# Patient Record
Sex: MALE | Race: WHITE | Employment: OTHER | RURAL
[De-identification: names, ages, dates, MRNs, and addresses within clinical notes are randomized per-mention and may not be internally consistent; named-entity substitution may affect disease eponyms.]

---

## 2017-07-14 ENCOUNTER — OFFICE VISIT (OUTPATIENT)
Dept: CARDIOLOGY CLINIC | Age: 82
End: 2017-07-14

## 2017-07-14 VITALS
OXYGEN SATURATION: 94 % | RESPIRATION RATE: 12 BRPM | DIASTOLIC BLOOD PRESSURE: 78 MMHG | SYSTOLIC BLOOD PRESSURE: 110 MMHG | HEART RATE: 92 BPM | HEIGHT: 71 IN

## 2017-07-14 DIAGNOSIS — I10 ESSENTIAL HYPERTENSION: ICD-10-CM

## 2017-07-14 DIAGNOSIS — E78.5 DYSLIPIDEMIA: ICD-10-CM

## 2017-07-14 DIAGNOSIS — E11.9 CONTROLLED TYPE 2 DIABETES MELLITUS WITHOUT COMPLICATION, WITHOUT LONG-TERM CURRENT USE OF INSULIN (HCC): ICD-10-CM

## 2017-07-14 DIAGNOSIS — M16.12 PRIMARY OSTEOARTHRITIS OF LEFT HIP: ICD-10-CM

## 2017-07-14 DIAGNOSIS — Z01.818 PREOPERATIVE CLEARANCE: Primary | ICD-10-CM

## 2017-07-14 DIAGNOSIS — E11.42 DIABETIC POLYNEUROPATHY ASSOCIATED WITH TYPE 2 DIABETES MELLITUS (HCC): ICD-10-CM

## 2017-07-14 DIAGNOSIS — I71.40 ABDOMINAL AORTIC ANEURYSM (AAA) WITHOUT RUPTURE: ICD-10-CM

## 2017-07-14 DIAGNOSIS — M79.605 PAIN OF LEFT LOWER EXTREMITY: ICD-10-CM

## 2017-07-14 DIAGNOSIS — I25.10 CORONARY ARTERY DISEASE INVOLVING NATIVE CORONARY ARTERY OF NATIVE HEART WITHOUT ANGINA PECTORIS: ICD-10-CM

## 2017-07-14 PROBLEM — M79.606 LEG PAIN: Status: ACTIVE | Noted: 2017-07-14

## 2017-07-14 PROBLEM — M16.9 DEGENERATIVE JOINT DISEASE (DJD) OF HIP: Status: ACTIVE | Noted: 2017-07-14

## 2017-07-14 RX ORDER — GABAPENTIN 600 MG/1
2400 TABLET ORAL 2 TIMES DAILY
Status: ON HOLD | COMMUNITY
End: 2017-07-22

## 2017-07-14 RX ORDER — DICLOFENAC SODIUM 50 MG/1
TABLET, DELAYED RELEASE ORAL 2 TIMES DAILY
COMMUNITY
End: 2017-08-30

## 2017-07-14 RX ORDER — FLUTICASONE FUROATE AND VILANTEROL 100; 25 UG/1; UG/1
1 POWDER RESPIRATORY (INHALATION) DAILY
COMMUNITY
End: 2017-08-30

## 2017-07-14 RX ORDER — ASPIRIN 81 MG/1
TABLET ORAL DAILY
COMMUNITY

## 2017-07-14 RX ORDER — OXYCODONE AND ACETAMINOPHEN 5; 325 MG/1; MG/1
2 TABLET ORAL
COMMUNITY
End: 2017-07-16

## 2017-07-14 RX ORDER — MONTELUKAST SODIUM 10 MG/1
10 TABLET ORAL DAILY
COMMUNITY

## 2017-07-14 RX ORDER — DULOXETIN HYDROCHLORIDE 30 MG/1
30 CAPSULE, DELAYED RELEASE ORAL
Status: ON HOLD | COMMUNITY
End: 2017-07-22

## 2017-07-14 RX ORDER — TAMSULOSIN HYDROCHLORIDE 0.4 MG/1
0.8 CAPSULE ORAL DAILY
COMMUNITY

## 2017-07-14 RX ORDER — FUROSEMIDE 40 MG/1
TABLET ORAL DAILY
COMMUNITY
End: 2017-07-22

## 2017-07-14 RX ORDER — CYCLOBENZAPRINE HCL 10 MG
TABLET ORAL
COMMUNITY
End: 2017-07-22

## 2017-07-14 RX ORDER — CARVEDILOL 3.12 MG/1
TABLET ORAL 2 TIMES DAILY WITH MEALS
COMMUNITY
End: 2017-08-30

## 2017-07-14 RX ORDER — DICLOFENAC SODIUM 10 MG/G
GEL TOPICAL 4 TIMES DAILY
COMMUNITY
End: 2017-07-22

## 2017-07-14 RX ORDER — GLIMEPIRIDE 1 MG/1
TABLET ORAL 2 TIMES DAILY
Status: ON HOLD | COMMUNITY
End: 2017-07-22

## 2017-07-14 RX ORDER — METFORMIN HYDROCHLORIDE 500 MG/1
TABLET ORAL 2 TIMES DAILY WITH MEALS
COMMUNITY
End: 2017-08-30

## 2017-07-14 RX ORDER — LEVOTHYROXINE SODIUM 50 UG/1
TABLET ORAL
COMMUNITY

## 2017-07-14 NOTE — PROGRESS NOTES
Bebeto Azevedo is a 80 y.o. male is here for cardiac evaluation, pre-op clearance for possible L THR. 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 DJD, prostate ca followed locally by Dr. Km Purcell; previously by Cardiology elsewhere--last seen 2015--lexiscan MPI with chronic inferolateral wall infarct/ischemia, LVEF 45%. Occasional asthma sx. No exertional CP or CV sx. DM followed by PCP, recent HbA1c 7.0. Severe back/L hip, L leg pain--s/p w/u--Xrays, MRI, dopplers, etc.--?hip etiology and considering L THR. .  The patient denies chest pain/ shortness of breath, orthopnea, PND, LE edema, palpitations, syncope, presyncope or fatigue. Patient Active Problem List    Diagnosis Date Noted    Coronary artery disease involving native coronary artery of native heart without angina pectoris 07/14/2017    Essential hypertension 07/14/2017    Dyslipidemia 07/14/2017    Controlled type 2 diabetes mellitus without complication, without long-term current use of insulin ([address] 07/14/2017    Abdominal aortic aneurysm (AAA) without rupture ([address] 07/14/2017    Diabetic polyneuropathy associated with type 2 diabetes mellitus ([address] 07/14/2017    Degenerative joint disease (DJD) of hip 07/14/2017      Fanta Barger MD  Past Medical History:   Diagnosis Date    AAA (abdominal aortic aneurysm) ([address]     CAD (coronary artery disease)     Mitzy MPI 7/14 LVEF 45, chronic inf-lat ischemia; Echo 7/14 LVEF 45, mod LVH, mild LAE, mild AI/MR    Diabetic polyneuropathy ([address]     DJD (degenerative joint disease)     DM II (diabetes mellitus, type II), controlled ([address]     Dyslipidemia     GERD (gastroesophageal reflux disease)     History of left bundle branch block (LBBB)     HTN (hypertension)     Prostate CA ([address]       History reviewed.  No pertinent surgical history. Allergies   Allergen Reactions    Contrast Agent [Iodine] Hives    Sulfa (Sulfonamide Antibiotics) Unknown (comments)      History reviewed. No pertinent family history. Social History     Social History    Marital status: UNKNOWN     Spouse name: N/A    Number of children: N/A    Years of education: N/A     Occupational History    Not on file. Social History Main Topics    Smoking status: Former Smoker     Types: Cigarettes     Quit date: 7/14/1997    Smokeless tobacco: Not on file    Alcohol use Not on file    Drug use: Not on file    Sexual activity: Not on file     Other Topics Concern    Not on file     Social History Narrative    No narrative on file      Current Outpatient Prescriptions   Medication Sig    tamsulosin (FLOMAX) 0.4 mg capsule Take 0.8 mg by mouth daily.  levothyroxine (SYNTHROID) 50 mcg tablet Take  by mouth Daily (before breakfast).  metFORMIN (GLUCOPHAGE) 500 mg tablet Take  by mouth two (2) times daily (with meals).  fluticasone-vilanterol (BREO ELLIPTA) 100-25 mcg/dose inhaler Take 1 Puff by inhalation daily.  carvedilol (COREG) 3.125 mg tablet Take  by mouth two (2) times daily (with meals).  glimepiride (AMARYL) 1 mg tablet Take  by mouth two (2) times a day.  montelukast (SINGULAIR) 10 mg tablet Take 10 mg by mouth daily.  aspirin delayed-release 81 mg tablet Take  by mouth daily.  diclofenac (VOLTAREN) 1 % gel Apply  to affected area four (4) times daily.  furosemide (LASIX) 40 mg tablet Take  by mouth daily.  diclofenac EC (VOLTAREN) 50 mg EC tablet Take  by mouth two (2) times a day.  oxyCODONE-acetaminophen (PERCOCET) 5-325 mg per tablet Take 2 Tabs by mouth every six (6) hours as needed for Pain.  cyclobenzaprine (FLEXERIL) 10 mg tablet Take  by mouth three (3) times daily as needed for Muscle Spasm(s).  DULoxetine (CYMBALTA) 30 mg capsule Take 30 mg by mouth three (3) times daily as needed.     gabapentin (NEURONTIN) 600 mg tablet Take 2,400 mg by mouth two (2) times a day. No current facility-administered medications for this visit. Review of Symptoms:    CONST  No weight change. No fever, chills, sweats    ENT No visual changes, URI sx, sore throat    CV  See HPI   RESP  No cough, or sputum, wheezing. Also see HPI   GI  No abdominal pain or change in bowel habits. No heartburn or dysphagia. No melena or rectal bleeding.   No dysuria, urgency, frequency, hematuria   MSKEL  No joint pain, swelling. No muscle pain. SKIN  No rash or lesions. NEURO  No headache, syncope, or seizure. No weakness, loss of sensation, or paresthesias. PSYCH  No low mood or depression  No anxiety. HE/LYMPH  No easy bruising, abnormal bleeding, or enlarged glands.         Physical ExamPhysical Exam:    Visit Vitals    /78 (BP 1 Location: Left arm, BP Patient Position: Sitting)    Pulse 92    Resp 12    Ht 5' 11\" (1.803 m)    SpO2 94%     Gen: NAD  HEENT:  PERRL, throat clear  Neck: no adenopathy, no thyromegaly, no JVD   Heart:  Regular,Nl S1S2,  no murmur, gallop or rub.   Lungs:  clear  Abdomen:   Soft, non-tender, bowel sounds are active.   Extremities:  No edema  Pulse: symmetric  Neuro: A&O times 3, No focal neuro deficits    Cardiographics    ECG: NSR, old inferolat MI, NST      Assessment:         Patient Active Problem List    Diagnosis Date Noted    Coronary artery disease involving native coronary artery of native heart without angina pectoris 07/14/2017    Essential hypertension 07/14/2017    Dyslipidemia 07/14/2017    Controlled type 2 diabetes mellitus without complication, without long-term current use of insulin ([address] 07/14/2017    Abdominal aortic aneurysm (AAA) without rupture ([address] 07/14/2017    Diabetic polyneuropathy associated with type 2 diabetes mellitus ([address] 07/14/2017    Degenerative joint disease (DJD) of hip 07/14/2017     Here for cardiac evaluation, pre-op clearance for possible L THR. 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 DJD, prostate ca followed locally by Dr. Antolin La; previously by Cardiology elsewhere--last seen 2015--lexiscan MPI with chronic inferolateral wall infarct/ischemia, LVEF 45%. Occasional asthma sx. No exertional CP or CV sx. DM followed by PCP, recent HbA1c 7.0. Severe back/L hip, L leg pain--s/p w/u--Xrays, MRI, dopplers, etc.--?hip etiology and considering L THR. Plan:     Allison Perez MPI--if unchanged (chronic inferolateral infarct/ischemia)--ok for planned sgy  Echo/doppler  ?statin  ? ACEI vs ARB  Continue current meds--DM and Coreg, asthma meds  Lipids, Lab, DM per PCP  F/u after tests      Pablo Anaya MD

## 2017-07-14 NOTE — MR AVS SNAPSHOT
Visit Information Date & Time Provider Department Dept. Phone Encounter #  
 7/14/2017  3:20 PM Rogerio Almaguer, [address] [facility] [place] [phone] [id number] Upcoming Health Maintenance Date Due DTaP/Tdap/Td series (1 - Tdap) 9/16/1953 ZOSTER VACCINE AGE 60> 9/16/1992 GLAUCOMA SCREENING Q2Y 9/16/1997 Pneumococcal 65+ Low/Medium Risk (1 of 2 - PCV13) 9/16/1997 MEDICARE YEARLY EXAM 9/16/1997 INFLUENZA AGE 9 TO ADULT 8/1/2017 Allergies as of 7/14/2017  Review Complete On: 7/14/2017 By: Rogerio Almaguer MD  
  
 Severity Noted Reaction Type Reactions Contrast Agent [Iodine]  07/14/2017    Hives Sulfa (Sulfonamide Antibiotics)  07/14/2017    Unknown (comments) Current Immunizations  Never Reviewed No immunizations on file. Not reviewed this visit You Were Diagnosed With   
  
 Codes Comments Preoperative clearance    -  Primary ICD-10-CM: U61.666 ICD-9-CM: V72.84 Coronary artery disease involving native coronary artery of native heart without angina pectoris     ICD-10-CM: I25.10 ICD-9-CM: 414.01 Vitals BP Pulse Resp Height(growth percentile) SpO2 Smoking Status 110/78 (BP 1 Location: Left arm, BP Patient Position: Sitting) 92 12 5' 11\" (1.803 m) 94% Former Smoker Vitals History Preferred Pharmacy Pharmacy Name Phone [id number], [address] [phone] Your Updated Medication List  
  
   
This list is accurate as of: 7/14/17  3:38 PM.  Always use your most recent med list.  
  
  
  
  
 aspirin delayed-release 81 mg tablet Take  by mouth daily. BREO ELLIPTA 100-25 mcg/dose inhaler Generic drug:  fluticasone-vilanterol Take 1 Puff by inhalation daily. COREG 3.125 mg tablet Generic drug:  carvedilol Take  by mouth two (2) times daily (with meals). cyclobenzaprine 10 mg tablet Commonly known as:  FLEXERIL  
 Take  by mouth three (3) times daily as needed for Muscle Spasm(s). CYMBALTA 30 mg capsule Generic drug:  DULoxetine Take 30 mg by mouth three (3) times daily as needed. FLOMAX 0.4 mg capsule Generic drug:  tamsulosin Take 0.8 mg by mouth daily. furosemide 40 mg tablet Commonly known as:  LASIX Take  by mouth daily. gabapentin 600 mg tablet Commonly known as:  NEURONTIN Take 2,400 mg by mouth two (2) times a day. glimepiride 1 mg tablet Commonly known as:  AMARYL Take  by mouth two (2) times a day. levothyroxine 50 mcg tablet Commonly known as:  SYNTHROID Take  by mouth Daily (before breakfast). metFORMIN 500 mg tablet Commonly known as:  GLUCOPHAGE Take  by mouth two (2) times daily (with meals). oxyCODONE-acetaminophen 5-325 mg per tablet Commonly known as:  PERCOCET Take 2 Tabs by mouth every six (6) hours as needed for Pain. SINGULAIR 10 mg tablet Generic drug:  montelukast  
Take 10 mg by mouth daily. * VOLTAREN 1 % Gel Generic drug:  diclofenac Apply  to affected area four (4) times daily. * diclofenac EC 50 mg EC tablet Commonly known as:  VOLTAREN Take  by mouth two (2) times a day. * Notice: This list has 2 medication(s) that are the same as other medications prescribed for you. Read the directions carefully, and ask your doctor or other care provider to review them with you. We Performed the Following AMB POC EKG ROUTINE W/ 12 LEADS, INTER & REP [81601 CPT(R)] Introducing [facility]! Alyssa Gil introduces [employer] patient portal. Now you can access parts of your medical record, email your doctor's office, and request medication refills online. 1. In your internet browser, go to [email or web address] 2. Click on the First Time User? Click Here link in the Sign In box. You will see the New Member Sign Up page. 3. Enter your [employer] Access Code exactly as it appears below. You will not need to use this code after youve completed the sign-up process. If you do not sign up before the expiration date, you must request a new code. · [employer] Access Code: [email or web address] Expires: 10/3/2017  3:20 PM 
 
4. Enter the last four digits of your Social Security Number (xxxx) and Date of Birth (mm/dd/yyyy) as indicated and click Submit. You will be taken to the next sign-up page. 5. Create a [employer] ID. This will be your [employer] login ID and cannot be changed, so think of one that is secure and easy to remember. 6. Create a [employer] password. You can change your password at any time. 7. Enter your Password Reset Question and Answer. This can be used at a later time if you forget your password. 8. Enter your e-mail address. You will receive e-mail notification when new information is available in [address] 9. Click Sign Up. You can now view and download portions of your medical record. 10. Click the Download Summary menu link to download a portable copy of your medical information. If you have questions, please visit the Frequently Asked Questions section of the [employer] website. Remember, [employer] is NOT to be used for urgent needs. For medical emergencies, dial 911. Now available from your iPhone and Android! Please provide this summary of care documentation to your next provider. Your primary care clinician is listed as Griselda Lime. If you have any questions after today's visit, please call [phone].

## 2017-07-18 ENCOUNTER — TELEPHONE (OUTPATIENT)
Dept: CARDIOLOGY CLINIC | Age: 82
End: 2017-07-18

## 2017-07-18 NOTE — TELEPHONE ENCOUNTER
Daughter calling  - pt scheduled for stress test tomorrow at 5 - 8:30 arrival.    Inquiring about pain medication and diabetic medications - if okay to take or not, need to hold, etc.

## 2017-07-19 ENCOUNTER — TELEPHONE (OUTPATIENT)
Dept: CARDIOLOGY CLINIC | Age: 82
End: 2017-07-19

## 2017-07-19 PROBLEM — R07.9 CHEST PAIN: Status: ACTIVE | Noted: 2017-07-19

## 2017-07-19 PROBLEM — R77.8 ELEVATED TROPONIN I LEVEL: Status: ACTIVE | Noted: 2017-07-19

## 2017-07-19 NOTE — TELEPHONE ENCOUNTER
----- Message from Pao June MD sent at 7/19/2017  3:03 PM EDT -----  Regarding: Krystal Marsh MPI  Advise no obvious blockages or ischemia seen, LVEF normal.  Thanks [facility]

## 2017-07-20 NOTE — TELEPHONE ENCOUNTER
Pt is currently admitted at [facility]. Dr. Jodie Mckenzie saw the pt this morning and informed him of the nm stress test results.

## 2017-07-22 PROBLEM — I21.4 NSTEMI, INITIAL EPISODE OF CARE (HCC): Status: ACTIVE | Noted: 2017-07-22

## 2017-07-22 PROBLEM — R77.8 ELEVATED TROPONIN I LEVEL: Status: RESOLVED | Noted: 2017-07-19 | Resolved: 2017-07-22

## 2017-07-22 PROBLEM — R07.9 CHEST PAIN: Status: RESOLVED | Noted: 2017-07-19 | Resolved: 2017-07-22

## 2017-08-17 ENCOUNTER — APPOINTMENT (OUTPATIENT)
Dept: ULTRASOUND IMAGING | Age: 82
DRG: 177 | End: 2017-08-17
Attending: EMERGENCY MEDICINE
Payer: MEDICARE

## 2017-08-17 ENCOUNTER — APPOINTMENT (OUTPATIENT)
Dept: GENERAL RADIOLOGY | Age: 82
DRG: 177 | End: 2017-08-17
Attending: EMERGENCY MEDICINE
Payer: MEDICARE

## 2017-08-17 ENCOUNTER — HOSPITAL ENCOUNTER (INPATIENT)
Age: 82
LOS: 13 days | Discharge: NURSING, REHAB OR LONG-TERM CARE | DRG: 177 | End: 2017-08-30
Attending: EMERGENCY MEDICINE | Admitting: INTERNAL MEDICINE
Payer: MEDICARE

## 2017-08-17 ENCOUNTER — APPOINTMENT (OUTPATIENT)
Dept: CT IMAGING | Age: 82
DRG: 177 | End: 2017-08-17
Attending: INTERNAL MEDICINE
Payer: MEDICARE

## 2017-08-17 DIAGNOSIS — R26.89 ABNORMALITY OF GAIT DUE TO IMPAIRMENT OF BALANCE: ICD-10-CM

## 2017-08-17 DIAGNOSIS — M16.12 PRIMARY OSTEOARTHRITIS OF LEFT HIP: ICD-10-CM

## 2017-08-17 DIAGNOSIS — R10.9 ABDOMINAL PAIN, UNSPECIFIED LOCATION: ICD-10-CM

## 2017-08-17 DIAGNOSIS — T40.2X5A THERAPEUTIC OPIOID-INDUCED CONSTIPATION (OIC): ICD-10-CM

## 2017-08-17 DIAGNOSIS — R63.0 ANOREXIA: ICD-10-CM

## 2017-08-17 DIAGNOSIS — I21.4 NSTEMI (NON-ST ELEVATED MYOCARDIAL INFARCTION) (HCC): Primary | ICD-10-CM

## 2017-08-17 DIAGNOSIS — I95.0 IDIOPATHIC HYPOTENSION: ICD-10-CM

## 2017-08-17 DIAGNOSIS — K59.03 THERAPEUTIC OPIOID-INDUCED CONSTIPATION (OIC): ICD-10-CM

## 2017-08-17 DIAGNOSIS — M25.552 CHRONIC LEFT HIP PAIN: ICD-10-CM

## 2017-08-17 DIAGNOSIS — R63.4 WEIGHT LOSS: ICD-10-CM

## 2017-08-17 DIAGNOSIS — G89.29 CHRONIC LEFT HIP PAIN: ICD-10-CM

## 2017-08-17 PROBLEM — R77.8 ELEVATED TROPONIN: Status: ACTIVE | Noted: 2017-08-17

## 2017-08-17 LAB
CK MB CFR SERPL CALC: 7.8 % (ref 0–2.5)
CK MB SERPL-MCNC: 1.8 NG/ML (ref 5–25)
CK SERPL-CCNC: 23 U/L (ref 39–308)
TROPONIN I SERPL-MCNC: 0.54 NG/ML

## 2017-08-17 PROCEDURE — 96372 THER/PROPH/DIAG INJ SC/IM: CPT

## 2017-08-17 PROCEDURE — 74011250637 HC RX REV CODE- 250/637: Performed by: INTERNAL MEDICINE

## 2017-08-17 PROCEDURE — 99285 EMERGENCY DEPT VISIT HI MDM: CPT

## 2017-08-17 PROCEDURE — 84484 ASSAY OF TROPONIN QUANT: CPT | Performed by: EMERGENCY MEDICINE

## 2017-08-17 PROCEDURE — 93005 ELECTROCARDIOGRAM TRACING: CPT

## 2017-08-17 PROCEDURE — 71010 XR CHEST PORT: CPT

## 2017-08-17 PROCEDURE — 94762 N-INVAS EAR/PLS OXIMTRY CONT: CPT

## 2017-08-17 PROCEDURE — 74011250636 HC RX REV CODE- 250/636: Performed by: EMERGENCY MEDICINE

## 2017-08-17 PROCEDURE — 36415 COLL VENOUS BLD VENIPUNCTURE: CPT | Performed by: EMERGENCY MEDICINE

## 2017-08-17 PROCEDURE — 96375 TX/PRO/DX INJ NEW DRUG ADDON: CPT

## 2017-08-17 PROCEDURE — 76700 US EXAM ABDOM COMPLETE: CPT

## 2017-08-17 PROCEDURE — 96374 THER/PROPH/DIAG INJ IV PUSH: CPT

## 2017-08-17 PROCEDURE — 82550 ASSAY OF CK (CPK): CPT | Performed by: EMERGENCY MEDICINE

## 2017-08-17 PROCEDURE — 65660000000 HC RM CCU STEPDOWN

## 2017-08-17 RX ORDER — MONTELUKAST SODIUM 10 MG/1
10 TABLET ORAL DAILY
Status: DISCONTINUED | OUTPATIENT
Start: 2017-08-18 | End: 2017-08-30 | Stop reason: HOSPADM

## 2017-08-17 RX ORDER — ACETAMINOPHEN 500 MG
1000 TABLET ORAL 3 TIMES DAILY
Status: DISCONTINUED | OUTPATIENT
Start: 2017-08-18 | End: 2017-08-23

## 2017-08-17 RX ORDER — DULOXETIN HYDROCHLORIDE 30 MG/1
30 CAPSULE, DELAYED RELEASE ORAL DAILY
Status: DISCONTINUED | OUTPATIENT
Start: 2017-08-18 | End: 2017-08-30 | Stop reason: HOSPADM

## 2017-08-17 RX ORDER — INSULIN LISPRO 100 [IU]/ML
INJECTION, SOLUTION INTRAVENOUS; SUBCUTANEOUS
Status: DISCONTINUED | OUTPATIENT
Start: 2017-08-18 | End: 2017-08-30 | Stop reason: HOSPADM

## 2017-08-17 RX ORDER — MORPHINE SULFATE 10 MG/ML
2 INJECTION, SOLUTION INTRAMUSCULAR; INTRAVENOUS
Status: COMPLETED | OUTPATIENT
Start: 2017-08-17 | End: 2017-08-17

## 2017-08-17 RX ORDER — ONDANSETRON 2 MG/ML
4 INJECTION INTRAMUSCULAR; INTRAVENOUS
Status: DISCONTINUED | OUTPATIENT
Start: 2017-08-17 | End: 2017-08-30 | Stop reason: HOSPADM

## 2017-08-17 RX ORDER — TAMSULOSIN HYDROCHLORIDE 0.4 MG/1
0.8 CAPSULE ORAL DAILY
Status: DISCONTINUED | OUTPATIENT
Start: 2017-08-18 | End: 2017-08-30 | Stop reason: HOSPADM

## 2017-08-17 RX ORDER — SODIUM CHLORIDE 0.9 % (FLUSH) 0.9 %
5-10 SYRINGE (ML) INJECTION EVERY 8 HOURS
Status: DISCONTINUED | OUTPATIENT
Start: 2017-08-17 | End: 2017-08-30 | Stop reason: HOSPADM

## 2017-08-17 RX ORDER — DIAZEPAM 5 MG/1
5 TABLET ORAL
Status: DISCONTINUED | OUTPATIENT
Start: 2017-08-17 | End: 2017-08-19

## 2017-08-17 RX ORDER — POLYETHYLENE GLYCOL 3350 17 G/17G
17 POWDER, FOR SOLUTION ORAL DAILY
Status: DISCONTINUED | OUTPATIENT
Start: 2017-08-18 | End: 2017-08-30 | Stop reason: HOSPADM

## 2017-08-17 RX ORDER — ENOXAPARIN SODIUM 100 MG/ML
1 INJECTION SUBCUTANEOUS
Status: COMPLETED | OUTPATIENT
Start: 2017-08-17 | End: 2017-08-17

## 2017-08-17 RX ORDER — LEVOTHYROXINE SODIUM 50 UG/1
50 TABLET ORAL
Status: DISCONTINUED | OUTPATIENT
Start: 2017-08-18 | End: 2017-08-30 | Stop reason: HOSPADM

## 2017-08-17 RX ORDER — ASPIRIN 81 MG/1
81 TABLET ORAL DAILY
Status: DISCONTINUED | OUTPATIENT
Start: 2017-08-18 | End: 2017-08-30 | Stop reason: HOSPADM

## 2017-08-17 RX ORDER — MAGNESIUM SULFATE 100 %
4 CRYSTALS MISCELLANEOUS AS NEEDED
Status: DISCONTINUED | OUTPATIENT
Start: 2017-08-17 | End: 2017-08-30 | Stop reason: HOSPADM

## 2017-08-17 RX ORDER — FENTANYL 12.5 UG/1
1 PATCH TRANSDERMAL
Status: DISCONTINUED | OUTPATIENT
Start: 2017-08-17 | End: 2017-08-19

## 2017-08-17 RX ORDER — FLUTICASONE FUROATE AND VILANTEROL 100; 25 UG/1; UG/1
1 POWDER RESPIRATORY (INHALATION) DAILY
Status: DISCONTINUED | OUTPATIENT
Start: 2017-08-18 | End: 2017-08-24

## 2017-08-17 RX ORDER — ENOXAPARIN SODIUM 100 MG/ML
40 INJECTION SUBCUTANEOUS DAILY
Status: DISCONTINUED | OUTPATIENT
Start: 2017-08-18 | End: 2017-08-30 | Stop reason: HOSPADM

## 2017-08-17 RX ORDER — AMOXICILLIN 250 MG
1 CAPSULE ORAL 2 TIMES DAILY
Status: DISCONTINUED | OUTPATIENT
Start: 2017-08-18 | End: 2017-08-30 | Stop reason: HOSPADM

## 2017-08-17 RX ORDER — GUAIFENESIN 100 MG/5ML
162 LIQUID (ML) ORAL
Status: DISCONTINUED | OUTPATIENT
Start: 2017-08-17 | End: 2017-08-17

## 2017-08-17 RX ORDER — DEXTROSE MONOHYDRATE 100 MG/ML
125-250 INJECTION, SOLUTION INTRAVENOUS AS NEEDED
Status: DISCONTINUED | OUTPATIENT
Start: 2017-08-17 | End: 2017-08-30 | Stop reason: HOSPADM

## 2017-08-17 RX ORDER — ISOSORBIDE MONONITRATE 30 MG/1
30 TABLET, EXTENDED RELEASE ORAL DAILY
Status: DISCONTINUED | OUTPATIENT
Start: 2017-08-18 | End: 2017-08-30 | Stop reason: HOSPADM

## 2017-08-17 RX ORDER — OXYCODONE HYDROCHLORIDE 5 MG/1
10 TABLET ORAL
Status: DISCONTINUED | OUTPATIENT
Start: 2017-08-17 | End: 2017-08-23

## 2017-08-17 RX ORDER — NITROGLYCERIN 0.4 MG/1
0.4 TABLET SUBLINGUAL AS NEEDED
Status: DISCONTINUED | OUTPATIENT
Start: 2017-08-17 | End: 2017-08-30 | Stop reason: HOSPADM

## 2017-08-17 RX ORDER — SODIUM CHLORIDE 0.9 % (FLUSH) 0.9 %
5-10 SYRINGE (ML) INJECTION AS NEEDED
Status: DISCONTINUED | OUTPATIENT
Start: 2017-08-17 | End: 2017-08-30 | Stop reason: HOSPADM

## 2017-08-17 RX ORDER — GABAPENTIN 300 MG/1
600 CAPSULE ORAL 4 TIMES DAILY
Status: DISCONTINUED | OUTPATIENT
Start: 2017-08-18 | End: 2017-08-27

## 2017-08-17 RX ORDER — NALOXONE HYDROCHLORIDE 0.4 MG/ML
0.4 INJECTION, SOLUTION INTRAMUSCULAR; INTRAVENOUS; SUBCUTANEOUS AS NEEDED
Status: DISCONTINUED | OUTPATIENT
Start: 2017-08-17 | End: 2017-08-30 | Stop reason: HOSPADM

## 2017-08-17 RX ORDER — DEXTROSE 50 % IN WATER (D50W) INTRAVENOUS SYRINGE
12.5-25 AS NEEDED
Status: DISCONTINUED | OUTPATIENT
Start: 2017-08-17 | End: 2017-08-17

## 2017-08-17 RX ORDER — CARVEDILOL 3.12 MG/1
3.12 TABLET ORAL 2 TIMES DAILY WITH MEALS
Status: DISCONTINUED | OUTPATIENT
Start: 2017-08-18 | End: 2017-08-22

## 2017-08-17 RX ORDER — ONDANSETRON 2 MG/ML
4 INJECTION INTRAMUSCULAR; INTRAVENOUS
Status: COMPLETED | OUTPATIENT
Start: 2017-08-17 | End: 2017-08-17

## 2017-08-17 RX ORDER — ATORVASTATIN CALCIUM 10 MG/1
10 TABLET, FILM COATED ORAL DAILY
Status: DISCONTINUED | OUTPATIENT
Start: 2017-08-18 | End: 2017-08-29

## 2017-08-17 RX ADMIN — ONDANSETRON 4 MG: 2 INJECTION INTRAMUSCULAR; INTRAVENOUS at 17:08

## 2017-08-17 RX ADMIN — MORPHINE SULFATE 2 MG: 10 INJECTION INTRAMUSCULAR; INTRAVENOUS; SUBCUTANEOUS at 19:09

## 2017-08-17 RX ADMIN — ENOXAPARIN SODIUM 100 MG: 100 INJECTION SUBCUTANEOUS at 19:09

## 2017-08-17 RX ADMIN — DIAZEPAM 5 MG: 5 TABLET ORAL at 23:24

## 2017-08-17 NOTE — ED NOTES
Pt continues to moan and sob, he is restless and moving all about the bed, pt has been repositioned multiple times, when asked if he's in pain he states \"i just don't know, I'm miserable\", dr Rodolfo cMrae notified

## 2017-08-17 NOTE — ED NOTES
Pt yelling out, opened door and found pt sitting at foot of bed, pt assisted back in bed on right side lying with pillows behind back, pt is moaning but denies pain presently, ice chips given, call bell placed in patient hand and patient reminded to not get out of bed alone, pt verbalizes understanding

## 2017-08-17 NOTE — IP AVS SNAPSHOT
[address] [facility] 
[phone] Patient: Fernando Zelaya MRN: [id number] You are allergic to the following Allergen Reactions Contrast Agent (Iodine) Hives Levaquin (Levofloxacin) Unknown (comments) Sulfa (Sulfonamide Antibiotics) Unknown (comments) Recent Documentation Height Weight BMI Smoking Status 1.778 m 96.5 kg 30.53 kg/m2 Former Smoker Unresulted Labs Order Current Status CULTURE, WOUND W GRAM STAIN Preliminary result Emergency Contacts Name Discharge Info Relation Home Work Mobile Maria Elena Cassidyamy N/A  AT THIS TIME [6] Spouse [3] [phone] Mami Sanches DISCHARGE CAREGIVER [3] Daughter [21] [phone] About your hospitalization You were admitted on:  August 17, 2017 You last received care in the:  [facility] You were discharged on:  August 30, 2017 Unit phone number:  [phone] Why you were hospitalized Your primary diagnosis was:  Cap (Community Acquired Pneumonia) Your diagnoses also included:  Abdominal Pain, Elevated Troponin, Essential Hypertension, Coronary Artery Disease Involving Native Coronary Artery Of Native Heart Without Angina Pectoris, Therapeutic Opioid-Induced Constipation (Oic), Chronic Left Hip Pain, Weight Loss, Abnormality Of Gait Due To Impairment Of Balance, Anorexia, Idiopathic Hypotension Providers Seen During Your Hospitalizations Provider Role Specialty Primary office phone Mckenna Rivas MD Attending Provider Emergency Medicine [phone] Yasmin Sanchez MD Attending Provider Internal Medicine [phone] Luis Quinones MD Attending Provider Hospitalist [phone] Danish Hastings MD Attending Provider Internal Medicine [phone] Your Primary Care Physician (PCP) Primary Care Physician Office Phone Office Fax 229 [facility], Ale Peña [phone] Follow-up Information Follow up With Details Comments Contact Info Nikunj Martinez MD  PCP - follow up in 1-2 weeks after discharge from [address] [facility] 67 [id number] [phone] Genesis Lobo MD  Pulmonary - follow up in 4 weeks - repeat chest xray [address] [facility] [address] [facility] 83. [phone] [address] [place] [address] are being discharged to facility for skilled care  [address] [facility], [facility], [address] [facility] 
[phone] [facility]8 [facility] information referral has been submitted to this agency [phone] Current Discharge Medication List  
  
START taking these medications Dose & Instructions Dispensing Information Comments Morning Noon Evening Bedtime  
 albuterol-ipratropium 2.5 mg-0.5 mg/3 ml Nebu Commonly known as:  Diania Comes Your last dose was: Your next dose is:    
   
   
 Dose:  3 mL  
3 mL by Nebulization route every six (6) hours as needed. Quantity:  30 Nebule Refills:  0  
     
   
   
   
  
 furosemide 20 mg tablet Commonly known as:  LASIX Your last dose was: Your next dose is:    
   
   
 Dose:  20 mg Take 1 Tab by mouth daily. Quantity:  30 Tab Refills:  0  
     
   
   
   
  
 L. acidoph & paracasei- S therm- Bifido 8 billion cell Cap cap Commonly known as:  DULCE-Q/RISAQUAD Your last dose was: Your next dose is:    
   
   
 Dose:  1 Cap Take 1 Cap by mouth daily. Quantity:  30 Cap Refills:  0  
     
   
   
   
  
 magnesium oxide 400 mg tablet Commonly known as:  MAG-OX Your last dose was: Your next dose is:    
   
   
 Dose:  400 mg Take 1 Tab by mouth two (2) times a day. Quantity:  60 Tab Refills:  0  
     
   
   
   
  
 midodrine 5 mg tablet Commonly known as:  Kyle Patrick Your last dose was: Your next dose is:    
   
   
 Dose:  5 mg Take 1 Tab by mouth three (3) times daily (with meals) for 30 days. Quantity:  90 Tab Refills:  0  
     
   
   
   
  
 pantoprazole 40 mg tablet Commonly known as:  PROTONIX Your last dose was: Your next dose is:    
   
   
 Dose:  40 mg Take 1 Tab by mouth Daily (before breakfast). Quantity:  30 Tab Refills:  0  
     
   
   
   
  
 polyethylene glycol 17 gram packet Commonly known as:  Sharonda Hodgkin Your last dose was: Your next dose is:    
   
   
 Dose:  17 g Take 1 Packet by mouth daily. Quantity:  30 Packet Refills:  0  
     
   
   
   
  
 ranolazine  mg SR tablet Commonly known as:  RANEXA Your last dose was: Your next dose is:    
   
   
 Dose:  500 mg Take 1 Tab by mouth two (2) times a day. Quantity:  60 Tab Refills:  0  
     
   
   
   
  
 senna-docusate 8.6-50 mg per tablet Commonly known as:  Herb Fought Your last dose was: Your next dose is:    
   
   
 Dose:  1 Tab Take 1 Tab by mouth two (2) times a day. Quantity:  60 Tab Refills:  0 CONTINUE these medications which have CHANGED Dose & Instructions Dispensing Information Comments Morning Noon Evening Bedtime  
 oxyCODONE-acetaminophen 5-325 mg per tablet Commonly known as:  PERCOCET What changed:   
- when to take this 
- reasons to take this - Another medication with the same name was removed. Continue taking this medication, and follow the directions you see here. Your last dose was: Your next dose is:    
   
   
 Dose:  1 Tab Take 1 Tab by mouth every six (6) hours. Max Daily Amount: 4 Tabs. Quantity:  10 Tab Refills:  0  
     
   
   
   
  
 potassium chloride SR 20 mEq tablet Commonly known as:  K-TAB What changed:   
- medication strength - how much to take Your last dose was: Your next dose is:    
   
   
 Dose:  20 mEq Take 1 Tab by mouth daily. Quantity:  30 Tab Refills:  0 CONTINUE these medications which have NOT CHANGED Dose & Instructions Dispensing Information Comments Morning Noon Evening Bedtime  
 aspirin delayed-release 81 mg tablet Your last dose was: Your next dose is: Take  by mouth daily. Refills:  0 DULoxetine 30 mg capsule Commonly known as:  CYMBALTA Your last dose was: Your next dose is:    
   
   
 Dose:  30 mg Take 1 Cap by mouth daily. Quantity:  1 Cap Refills:  0  
     
   
   
   
  
 FLOMAX 0.4 mg capsule Generic drug:  tamsulosin Your last dose was: Your next dose is:    
   
   
 Dose:  0.8 mg Take 0.8 mg by mouth daily. Refills:  0  
     
   
   
   
  
 gabapentin 600 mg tablet Commonly known as:  NEURONTIN Your last dose was: Your next dose is:    
   
   
 Dose:  600 mg Take 1 Tab by mouth four (4) times daily. Quantity:  1 Tab Refills:  0  
     
   
   
   
  
 isosorbide mononitrate ER 60 mg CR tablet Commonly known as:  IMDUR Your last dose was: Your next dose is:    
   
   
 Dose:  60 mg Take 1 Tab by mouth every morning. Quantity:  30 Tab Refills:  0  
     
   
   
   
  
 levothyroxine 50 mcg tablet Commonly known as:  SYNTHROID Your last dose was: Your next dose is: Take  by mouth Daily (before breakfast). Refills:  0  
     
   
   
   
  
 nitroglycerin 0.4 mg SL tablet Commonly known as:  NITROSTAT Your last dose was: Your next dose is:    
   
   
 Dose:  0.4 mg  
1 Tab by SubLINGual route as needed for Chest Pain. Quantity:  1 Tab Refills:  0 SINGULAIR 10 mg tablet Generic drug:  montelukast  
   
Your last dose was: Your next dose is:    
   
   
 Dose:  10 mg Take 10 mg by mouth daily. Refills:  0 STOP taking these medications   
 atorvastatin 10 mg tablet Commonly known as:  LIPITOR  
   
  
 azithromycin 250 mg tablet Commonly known as:  ZITHROMAX Z-TREY  
   
  
 BREO ELLIPTA 100-25 mcg/dose inhaler Generic drug:  fluticasone-vilanterol COREG 3.125 mg tablet Generic drug:  carvedilol  
   
  
 diazePAM 5 mg tablet Commonly known as:  VALIUM  
   
  
 diclofenac EC 50 mg EC tablet Commonly known as:  VOLTAREN  
   
  
 glimepiride 1 mg tablet Commonly known as:  AMARYL  
   
  
 metFORMIN 500 mg tablet Commonly known as:  GLUCOPHAGE  
   
  
 oxyCODONE IR 15 mg immediate release tablet Commonly known as:  OXY-IR  
   
  
 predniSONE 10 mg dose pack Commonly known as:  STERAPRED DS Where to Get Your Medications Information on where to get these meds will be given to you by the nurse or doctor. ! Ask your nurse or doctor about these medications  
  albuterol-ipratropium 2.5 mg-0.5 mg/3 ml Nebu  
 furosemide 20 mg tablet L. acidoph & paracasei- S therm- Bifido 8 billion cell Cap cap  
 magnesium oxide 400 mg tablet  
 midodrine 5 mg tablet  
 oxyCODONE-acetaminophen 5-325 mg per tablet  
 pantoprazole 40 mg tablet  
 polyethylene glycol 17 gram packet  
 potassium chloride SR 20 mEq tablet  
 ranolazine  mg SR tablet  
 senna-docusate 8.6-50 mg per tablet Discharge Instructions Patient Discharge Instructions Pt Name  Dary Peck Date of Birth 9/16/1932 Age  80 y.o. Medical Record Number  [id number] PCP Jes Cook MD   
Admit date:  8/17/2017 @    [place] [address] Room Number  1176/41 Date of Discharge 8/30/2017 Admission Diagnoses:     CAP (community acquired pneumonia) Allergies Allergen Reactions  Contrast Agent [Iodine] Hives  Levaquin [Levofloxacin] Unknown (comments)  Sulfa (Sulfonamide Antibiotics) Unknown (comments) You were admitted to [address] for  CAP (community acquired pneumonia) YOUR OTHER MEDICAL DIAGNOSES INCLUDE (BUT NOT LIMITED TO ): 
Present on Admission:  Essential hypertension  Coronary artery disease involving native coronary artery of native heart without angina pectoris  Therapeutic opioid-induced constipation (OIC)  Chronic left hip pain  Weight loss  Abnormality of gait due to impairment of balance DIET:  Dysphagia II diet Recommended activity: Activity as tolerated; pt has been bed bound for the past a few months Follow up : Follow-up Information Follow up With Details Comments Contact Info Monica Pro MD  PCP - follow up in 1-2 weeks after discharge from [address] [place] [address] and [address] [place] [phone] Alvarado Morley MD  Pulmonary - follow up in 4 weeks - repeat chest xray [address] [facility] [address] [place] [address] 
[phone] [address] [place] [address] are being discharged to facility for skilled care  [address] [place] [address] 
[phone] [facility] information referral has been submitted to this agency [phone] Skilled nursing facility MD responsible for above upon discharge. · It is important that you take the medication exactly as they are prescribed. · Keep your medication in the bottles provided by the pharmacist and keep a list of the medication names, dosages, and times to be taken in your wallet. · Do not take other medications without consulting your doctor.   
 
 
ADDITIONAL INFORMATION: If you experience any of the following symptoms or have any health problem not listed below, then please call your primary care physician or return to the emergency room if you cannot get hold of your doctor: Fever, chills, nausea, vomiting, diarrhea, change in mentation, falling, bleeding, shortness of breath. I understand that if any problems occur once I am discharged, I am supposed to call my Primary care physician for further care or seek help in the Emergency Department at the nearest Healthcare facility. I have had an opportunity to discuss my clinical issues with my doctor and nursing staff. I understand and acknowledge receipt of the above instructions. Physician's or R.N.'s Signature                                                            Date/Time Patient or Representative Signature                                                 Date/Time Discharge Orders None [employer] Announcement We are excited to announce that we are making your provider's discharge notes available to you in [employer]. You will see these notes when they are completed and signed by the physician that discharged you from your recent hospital stay. If you have any questions or concerns about any information you see in [employer], please call the Health Information Department where you were seen or reach out to your Primary Care Provider for more information about your plan of care. Introducing [facility]! Dear Parmjit Vick: Thank you for requesting a [employer] account. Our records indicate that you already have an active [employer] account. You can access your account anytime at [email or web address] Did you know that you can access your hospital and ER discharge instructions at any time in [employer]? You can also review all of your test results from your hospital stay or ER visit. Additional Information If you have questions, please visit the Frequently Asked Questions section of the MyChart website at [email or web address]. [employer]/mychart/. Remember, MyChart is NOT to be used for urgent needs. For medical emergencies, dial 911. Now available from your iPhone and Android! General Information Please provide this summary of care documentation to your next provider. Patient Signature:  ____________________________________________________________ Date:  ____________________________________________________________  
  
Larri Hazard Provider Signature:  ____________________________________________________________ Date:  ____________________________________________________________

## 2017-08-17 NOTE — ED PROVIDER NOTES
HPI Comments: Lalito Harden is a 80 y.o. male with PMHx of CAD, DM II, HTN, dyslipidemia, AAA, LBBB, GERD, and MI, and PSHx of stent placement x 2, presenting per EMS transferred from hospitals to ED c/o intermittent dull mild diffuse abdominal pain for the past two weeks. Pt notes he has been having intermittent CP for the past few weeks, which became worse today, as well as had onset of N/V today, prompting him to seek evaluation at hospitals ED. Per pt records, pt was transferred here from hospitals for NSTEMI. He presented to hospitals ED for epigastric pain since this morning with N/V; workup there revealed stable 5.3x5 cm infrarenal AAA, distended gallbladder, and mild pelvic free fluid; labs significant for mildly elevated WBC at 13.6 and mildly elevated AST of 104; remainder of LFT's WNL; troponin was 0.32; a central line was established for IV access while there. Pt reports his AAA was diagnosed five years ago and is currently inoperable. He endorses history of MI and stent placement x2 and notes he is followed by Dr. Malena Dior, cardiologist. Pt denies taking blood thinners. He notes he takes a daily baby ASA. Pt notes he was diagnosed with PNA four days ago, which is being treated with Levaquin. He reports he lives at home. Pt specifically denies SOB. Cardiologist: Dr. Malena Dior  PCP: Britany Ca MD  Social Hx: former smoker (quit date: 7/14/2017); - ETOH    There are no other complaints, changes, or physical findings at this time. Written by ZANE Canada, as dictated by Cesar Javed MD.           The history is provided by the patient and the EMS personnel.         Past Medical History:   Diagnosis Date    AAA (abdominal aortic aneurysm) (HCC)     Asthma     CAD (coronary artery disease)     Mitzy MPI 7/14 LVEF 45, chronic inf-lat ischemia; Echo 7/14 LVEF 45, mod LVH, mild LAE, mild AI/MR    Diabetic polyneuropathy (HCC)     DJD (degenerative joint disease)     DM II (diabetes mellitus, type II), [facility] ([facility]     Dyslipidemia     GERD (gastroesophageal reflux disease)     History of left bundle branch block (LBBB)     HTN (hypertension)     Myocardial infarction ([facility]     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    Pneumonia     Prostate CA ([facility] 2012    s/p XRT, hormonal       Past Surgical History:   Procedure Laterality Date    HX APPENDECTOMY      HX HEMORRHOIDECTOMY      HX PTCA      x 2 stents, vessel unknown, last 10 yrs ago         Family History:   Problem Relation Age of Onset    Tuberculosis Mother     Asthma Father     Coronary Artery Disease Sister     Asthma Sister        Social History     Social History    Marital status:      Spouse name: N/A    Number of children: N/A    Years of education: N/A     Occupational History    Not on file. Social History Main Topics    Smoking status: Former Smoker     Types: Cigarettes     Quit date: 7/14/1997    Smokeless tobacco: Never Used    Alcohol use No    Drug use: Not on file    Sexual activity: Not on file     Other Topics Concern    Not on file     Social History Narrative         ALLERGIES: Contrast agent [iodine]; Levaquin [levofloxacin]; and Sulfa (sulfonamide antibiotics)    Review of Systems   Constitutional: Negative for chills, diaphoresis, fever and unexpected weight change. HENT: Negative for rhinorrhea and sore throat. Eyes: Negative for pain. Respiratory: Negative for shortness of breath, wheezing and stridor. Cardiovascular: Positive for chest pain. Negative for leg swelling. Gastrointestinal: Positive for abdominal pain (diffuse), nausea and vomiting. Negative for blood in stool and diarrhea. Genitourinary: Negative for difficulty urinating, dysuria and flank pain. Musculoskeletal: Negative for back pain and neck stiffness. Skin: Negative for rash. Neurological: Negative for seizures, syncope, weakness, light-headedness and headaches. Psychiatric/Behavioral: Negative for confusion.    All other systems reviewed and are negative. Vitals:    08/17/17 1745 08/17/17 1812 08/17/17 1830 08/17/17 1900   BP: 146/89 145/84 (!) 137/93 125/71   Pulse: 91 (!) 116 93 90   Resp: 20 (!) 36 21 24   Temp:       SpO2: 99% 95% (!) 86% 91%            Physical Exam   Constitutional: He is oriented to person, place, and time. He appears well-developed and well-nourished. No distress. Actively vomiting on exam;   HENT:   Nose: Nose normal.   Mouth/Throat: Oropharynx is clear and moist. No oropharyngeal exudate. Eyes: Conjunctivae and EOM are normal. Pupils are equal, round, and reactive to light. Right eye exhibits no discharge. Left eye exhibits no discharge. No scleral icterus. Neck: Normal range of motion. Neck supple. No JVD present. Cardiovascular: Normal rate, regular rhythm, normal heart sounds and intact distal pulses. No murmur heard. Pulmonary/Chest: Effort normal and breath sounds normal. No stridor. No respiratory distress. He has no wheezes. He has no rales. Abdominal: Soft. Bowel sounds are normal. He exhibits no distension. There is no tenderness. There is no rebound and no guarding. Musculoskeletal: He exhibits no edema or tenderness. Neurological: He is alert and oriented to person, place, and time. Skin: Skin is warm and dry. He is not diaphoretic. Psychiatric: He has a normal mood and affect. Nursing note and vitals reviewed.        MDM  Number of Diagnoses or Management Options  NSTEMI (non-ST elevated myocardial infarction) [facility]):      Amount and/or Complexity of Data Reviewed  Clinical lab tests: ordered and reviewed  Tests in the radiology section of CPT®: ordered and reviewed  Tests in the medicine section of CPT®: ordered and reviewed  Obtain history from someone other than the patient: yes (EMS)  Review and summarize past medical records: yes  Discuss the patient with other providers: yes (Cardiologist, Hospitalist)  Independent visualization of images, tracings, or specimens: yes    Patient Progress  Patient progress: stable    Procedures    EKG interpretation: (Preliminary) 6379  Rhythm: NSR with sinus arrhythmia. Rate (approx.): 87; Axis: normal; MS interval: normal; QRS interval: normal ; ST/T wave: T wave abnormality, consider anterolateral ischemia; Other findings: inferior infarct, age undetermined. Written by ZANE Nur, as dictated by Jose Puckett MD.     Progress Note:  4:57 PM  Pt's records reviewed demonstrate pt had nuclear stress done 7/19/17, examination limited due to artifact. No evidence of myocardial ischemia or infarct; LVEF of 65 percent with note of global dyskinesia. Written by ZANE Nur, as dictated by Jose Puckett MD.     Progress Note:  5:39 PM  Pt states he received four baby ASA at [facility]. Written by ZANE Nur, as dictated by Jose Puckett MD.     CONSULT NOTE:   6:38 PM  Jose Puckett MD spoke with Dr. Roscoe Rojas,   Specialty: Cardiology  Discussed pt's hx, disposition, and available diagnostic and imaging results. Reviewed care plans. Consultant agrees with plans as outlined. Dr. Roscoe Rojas recommends admission to the Hospitalist.   Written by ZANE Nur, as dictated by Jose Puckett MD.    CONSULT NOTE:   7:37 PM  Jose Puckett MD spoke with Dr. Jessica Bateman,   Specialty: Hospitalist  Discussed pt's hx, disposition, and available diagnostic and imaging results. Reviewed care plans. Consultant will evaluate pt for admission.   Written by ZANE Nur, as dictated by Jose Puckett MD.     LABORATORY TESTS:  Recent Results (from the past 12 hour(s))   CBC WITH AUTOMATED DIFF    Collection Time: 08/17/17 12:02 PM   Result Value Ref Range    WBC 13.6 (H) 4.1 - 11.1 K/uL    RBC 4.16 4.10 - 5.70 M/uL    HGB 13.1 12.1 - 17.0 g/dL    HCT 39.9 36.6 - 50.3 %    MCV 95.9 80.0 - 99.0 FL    MCH 31.5 26.0 - 34.0 PG    MCHC 32.8 30.0 - 36.5 g/dL    RDW 14.2 11.5 - 14.5 %    PLATELET 621 766 - 286 K/uL    NEUTROPHILS 77 (H) 32 - 75 %    LYMPHOCYTES 9 (L) 12 - 49 %    MONOCYTES 9 5 - 13 %    EOSINOPHILS 5 0 - 7 %    BASOPHILS 0 0 - 1 %    ABS. NEUTROPHILS 10.5 (H) 1.8 - 8.0 K/UL    ABS. LYMPHOCYTES 1.2 0.8 - 3.5 K/UL    ABS. MONOCYTES 1.2 (H) 0.0 - 1.0 K/UL    ABS. EOSINOPHILS 0.7 (H) 0.0 - 0.4 K/UL    ABS. BASOPHILS 0.0 0.0 - 0.1 K/UL    DF SMEAR SCANNED      RBC COMMENTS NORMOCYTIC, NORMOCHROMIC     METABOLIC PANEL, COMPREHENSIVE    Collection Time: 08/17/17 12:02 PM   Result Value Ref Range    Sodium 137 136 - 145 mmol/L    Potassium 4.4 3.5 - 5.1 mmol/L    Chloride 101 97 - 108 mmol/L    CO2 27 21 - 32 mmol/L    Anion gap 9 5 - 15 mmol/L    Glucose 123 (H) 65 - 100 mg/dL    BUN 31 (H) 7.0 - 18.0 MG/DL    Creatinine 0.96 0.70 - 1.30 MG/DL    BUN/Creatinine ratio 32 (H) 12 - 20      GFR est AA >60 >60 ml/min/1.73m2    GFR est non-AA >60 >60 ml/min/1.73m2    Calcium 8.7 8.5 - 10.1 MG/DL    Bilirubin, total 0.6 0.2 - 1.0 MG/DL    ALT (SGPT) 39 14 - 63 U/L    AST (SGOT) 104 (H) 15 - 37 U/L    Alk.  phosphatase 101 45 - 117 U/L    Protein, total 6.3 (L) 6.4 - 8.2 g/dL    Albumin 2.0 (L) 3.5 - 5.0 g/dL    Globulin 4.3 (H) 2.0 - 4.0 g/dL    A-G Ratio 0.5 (L) 1.1 - 2.2     LIPASE    Collection Time: 08/17/17 12:02 PM   Result Value Ref Range    Lipase 82 73 - 393 U/L   TROPONIN I    Collection Time: 08/17/17 12:02 PM   Result Value Ref Range    Troponin-I, Qt. 0.32 (H) <0.08 ng/mL   URINALYSIS W/MICROSCOPIC    Collection Time: 08/17/17 12:09 PM   Result Value Ref Range    Color YELLOW/STRAW      Appearance CLEAR CLEAR      Specific gravity 1.015 1.003 - 1.030      pH (UA) 6.5 5.0 - 8.0      Protein 30 (A) NEG mg/dL    Glucose NEGATIVE  NEG mg/dL    Ketone 15 (A) NEG mg/dL    Bilirubin NEGATIVE  NEG      Blood NEGATIVE  NEG      Urobilinogen 1.0 0.2 - 1.0 EU/dL    Nitrites NEGATIVE  NEG      Leukocyte Esterase NEGATIVE  NEG      WBC 0-4 0 - 4 /hpf    RBC 0-5 0 - 5 /hpf    Epithelial cells FEW FEW /lpf    Bacteria NEGATIVE  NEG /hpf   EKG, 12 LEAD, SUBSEQUENT    Collection Time: 08/17/17  1:24 PM   Result Value Ref Range    Ventricular Rate 90 BPM    Atrial Rate 90 BPM    P-R Interval 196 ms    QRS Duration 102 ms    Q-T Interval 402 ms    QTC Calculation (Bezet) 491 ms    Calculated P Axis 80 degrees    Calculated R Axis 11 degrees    Calculated T Axis 112 degrees    Diagnosis       Normal sinus rhythm  Inferior infarct , age undetermined  T wave abnormality, consider anterolateral ischemia  Abnormal ECG  When compared with ECG of 17-AUG-2017 11:01,  MANUAL COMPARISON REQUIRED, DATA IS UNCONFIRMED     PROTHROMBIN TIME + INR    Collection Time: 08/17/17  2:39 PM   Result Value Ref Range    INR 1.4 (H) 0.9 - 1.1      Prothrombin time 15.7 (H) 10.0 - 13.0 sec   PTT    Collection Time: 08/17/17  2:39 PM   Result Value Ref Range    aPTT 20.4 (L) 23.0 - 33.5 SEC   EKG, 12 LEAD, INITIAL    Collection Time: 08/17/17  4:49 PM   Result Value Ref Range    Ventricular Rate 87 BPM    Atrial Rate 87 BPM    P-R Interval 192 ms    QRS Duration 108 ms    Q-T Interval 418 ms    QTC Calculation (Bezet) 502 ms    Calculated P Axis 8 degrees    Calculated R Axis -3 degrees    Calculated T Axis 99 degrees    Diagnosis       Normal sinus rhythm with sinus arrhythmia  Inferior infarct , age undetermined  T wave abnormality, consider anterolateral ischemia  Prolonged QT  When compared with ECG of 17-AUG-2017 13:24,  MANUAL COMPARISON REQUIRED, DATA IS UNCONFIRMED     TROPONIN I    Collection Time: 08/17/17  5:30 PM   Result Value Ref Range    Troponin-I, Qt. 0.54 (H) <0.05 ng/mL   CK W/ CKMB & INDEX    Collection Time: 08/17/17  5:30 PM   Result Value Ref Range    CK 23 (L) 39 - 308 U/L    CK - MB 1.8 <3.6 NG/ML    CK-MB Index 7.8 (H) 0 - 2.5         IMAGING RESULTS:  Clinical history: Chest pain  INDICATION: Chest pain     COMPARISON: 8/17/2017, 1420     FINDINGS:   AP semiupright view of the chest is obtained .  The cardiopericardial silhouette  is stable. Left upper lobe consolidation/atelectasis. Patient is on a cardiac  monitor. Right subclavian PICC line catheter tip terminates at SVC. Aortic and  cardiac vascular calcifications.      IMPRESSION  IMPRESSION:     Left upper lobe atelectasis/consolidation. Not significantly changed compared to  examination earlier in the day.            ULTRASOUND OF THE ABDOMEN     INDICATION: diffuse abd pain, distended gallbladder on ct     COMPARISON: Same day CT abdomen pelvis     TECHNIQUE:  Sonography of the abdomen was performed.      FINDINGS:     LIVER: Normal echogenicity. No focal hepatic mass or intrahepatic biliary  dilatation is shown. MAIN PORTAL VEIN: Patent. Appropriate hepatopetal flow. GALLBLADDER: Sludge layers dependently in the distended gallbladder. No wall  thickening or pericholecystic fluid. COMMON DUCT: 0. 3 cm in diameter. The duct is normal caliber. PANCREAS: The visualized portions of the pancreas are normal.   SPLEEN: Poorly visualized. RIGHT KIDNEY: 11.6 cm in length. No hydronephrosis, shadowing calculus, or  contour-deforming renal mass. LEFT KIDNEY: Approximately 11.3 cm in length. The upper pole is obscured. AORTA: Obscured by bowel gas. INFERIOR VENA CAVA: Obscured by bowel gas.        IMPRESSION  IMPRESSION:   Sludge layers dependently in the nondistended gallbladder. No wall thickening or  pericholecystic fluid.         MEDICATIONS GIVEN:  Medications   aspirin chewable tablet 162 mg (0 mg Oral Held 8/17/17 1716)   ondansetron (ZOFRAN) injection 4 mg (4 mg IntraVENous Given 8/17/17 1708)   enoxaparin (LOVENOX) injection 100 mg (100 mg SubCUTAneous Given 8/17/17 1909)   morphine injection 2 mg (2 mg IntraVENous Given 8/17/17 1909)       IMPRESSION:  1. NSTEMI (non-ST elevated myocardial infarction) ([facility]        PLAN:  1. Admit to Hospitalist.     7:37 PM  Patient is being admitted to the hospital by Dr. Luiza Redd.   The results of their tests and reasons for their admission have been discussed with them and/or available family. They convey agreement and understanding for the need to be admitted and for their admission diagnosis. Consultation has been made with the inpatient physician specialist for hospitalization. Written by ZANE Canada, as dictated by Cesar Javed MD.    This note is prepared by Flash Ulrich, acting as Scribe for Cesar Javed MD.    Cesar Javed MD: The scribe's documentation has been prepared under my direction and personally reviewed by me in its entirety. I confirm that the note above accurately reflects all work, treatment, procedures, and medical decision making performed by me.

## 2017-08-17 NOTE — ED NOTES
Received pt to exam room, resting on stretcher in position of comfort, call bell given to pt; per EMS pt went to the ED for c/o abd pain with nausea for 2 weeks, seen recently in the ER and dx PNA, while in the ED EKG changes were noted with and elevated troponin, pt denies chest pain or SOB, upon arrival pt is spitting brown mucus and c/o nausea, he is also c/o sacral pain secondary to a wound

## 2017-08-18 LAB
ALBUMIN SERPL-MCNC: 1.9 G/DL (ref 3.5–5)
ALBUMIN/GLOB SERPL: 0.5 {RATIO} (ref 1.1–2.2)
ALP SERPL-CCNC: 87 U/L (ref 45–117)
ALT SERPL-CCNC: 28 U/L (ref 12–78)
ANION GAP SERPL CALC-SCNC: 7 MMOL/L (ref 5–15)
AST SERPL-CCNC: 54 U/L (ref 15–37)
ATRIAL RATE: 108 BPM
ATRIAL RATE: 87 BPM
BASOPHILS # BLD: 0 K/UL
BASOPHILS NFR BLD: 0 %
BILIRUB SERPL-MCNC: 0.6 MG/DL (ref 0.2–1)
BUN SERPL-MCNC: 23 MG/DL (ref 6–20)
BUN/CREAT SERPL: 34 (ref 12–20)
CALCIUM SERPL-MCNC: 7.9 MG/DL (ref 8.5–10.1)
CALCULATED P AXIS, ECG09: -9 DEGREES
CALCULATED P AXIS, ECG09: 8 DEGREES
CALCULATED R AXIS, ECG10: -12 DEGREES
CALCULATED R AXIS, ECG10: -3 DEGREES
CALCULATED T AXIS, ECG11: 127 DEGREES
CALCULATED T AXIS, ECG11: 99 DEGREES
CHLORIDE SERPL-SCNC: 100 MMOL/L (ref 97–108)
CO2 SERPL-SCNC: 27 MMOL/L (ref 21–32)
CREAT SERPL-MCNC: 0.67 MG/DL (ref 0.7–1.3)
DIAGNOSIS, 93000: NORMAL
DIAGNOSIS, 93000: NORMAL
DIFFERENTIAL METHOD BLD: ABNORMAL
EOSINOPHIL # BLD: 0.2 K/UL
EOSINOPHIL NFR BLD: 1 %
ERYTHROCYTE [DISTWIDTH] IN BLOOD BY AUTOMATED COUNT: 14.1 % (ref 11.5–14.5)
EST. AVERAGE GLUCOSE BLD GHB EST-MCNC: 143 MG/DL
GLOBULIN SER CALC-MCNC: 4.2 G/DL (ref 2–4)
GLUCOSE BLD STRIP.AUTO-MCNC: 155 MG/DL (ref 65–100)
GLUCOSE BLD STRIP.AUTO-MCNC: 167 MG/DL (ref 65–100)
GLUCOSE BLD STRIP.AUTO-MCNC: 203 MG/DL (ref 65–100)
GLUCOSE BLD STRIP.AUTO-MCNC: 220 MG/DL (ref 65–100)
GLUCOSE SERPL-MCNC: 131 MG/DL (ref 65–100)
HBA1C MFR BLD: 6.6 % (ref 4.2–6.3)
HCT VFR BLD AUTO: 35.7 % (ref 36.6–50.3)
HGB BLD-MCNC: 11.9 G/DL (ref 12.1–17)
LYMPHOCYTES # BLD: 1.9 K/UL
LYMPHOCYTES NFR BLD: 12 %
MCH RBC QN AUTO: 31.8 PG (ref 26–34)
MCHC RBC AUTO-ENTMCNC: 33.3 G/DL (ref 30–36.5)
MCV RBC AUTO: 95.5 FL (ref 80–99)
MONOCYTES # BLD: 1.7 K/UL
MONOCYTES NFR BLD: 11 %
NEUTS SEG # BLD: 12.1 K/UL
NEUTS SEG NFR BLD: 76 %
NRBC # BLD: 0 K/UL (ref 0–0.01)
NRBC BLD-RTO: 0 PER 100 WBC
P-R INTERVAL, ECG05: 168 MS
P-R INTERVAL, ECG05: 192 MS
PLATELET # BLD AUTO: 223 K/UL (ref 150–400)
PLATELET COMMENTS,PCOM: ABNORMAL
POTASSIUM SERPL-SCNC: 3.8 MMOL/L (ref 3.5–5.1)
PROT SERPL-MCNC: 6.1 G/DL (ref 6.4–8.2)
Q-T INTERVAL, ECG07: 404 MS
Q-T INTERVAL, ECG07: 418 MS
QRS DURATION, ECG06: 104 MS
QRS DURATION, ECG06: 108 MS
QTC CALCULATION (BEZET), ECG08: 502 MS
QTC CALCULATION (BEZET), ECG08: 541 MS
RBC # BLD AUTO: 3.74 M/UL (ref 4.1–5.7)
RBC MORPH BLD: ABNORMAL
SERVICE CMNT-IMP: ABNORMAL
SODIUM SERPL-SCNC: 134 MMOL/L (ref 136–145)
TROPONIN I SERPL-MCNC: 0.55 NG/ML
VENTRICULAR RATE, ECG03: 108 BPM
VENTRICULAR RATE, ECG03: 87 BPM
WBC # BLD AUTO: 15.9 K/UL (ref 4.1–11.1)
WBC MORPH BLD: ABNORMAL

## 2017-08-18 PROCEDURE — 93306 TTE W/DOPPLER COMPLETE: CPT

## 2017-08-18 PROCEDURE — 83036 HEMOGLOBIN GLYCOSYLATED A1C: CPT | Performed by: INTERNAL MEDICINE

## 2017-08-18 PROCEDURE — 77030037878 HC DRSG MEPILEX >48IN BORD MOLN -B

## 2017-08-18 PROCEDURE — 80053 COMPREHEN METABOLIC PANEL: CPT | Performed by: INTERNAL MEDICINE

## 2017-08-18 PROCEDURE — 74011250637 HC RX REV CODE- 250/637: Performed by: INTERNAL MEDICINE

## 2017-08-18 PROCEDURE — 77030011256 HC DRSG MEPILEX <16IN NO BORD MOLN -A

## 2017-08-18 PROCEDURE — 74011000250 HC RX REV CODE- 250: Performed by: NURSE PRACTITIONER

## 2017-08-18 PROCEDURE — 85025 COMPLETE CBC W/AUTO DIFF WBC: CPT | Performed by: INTERNAL MEDICINE

## 2017-08-18 PROCEDURE — 74011636637 HC RX REV CODE- 636/637: Performed by: INTERNAL MEDICINE

## 2017-08-18 PROCEDURE — 74011250636 HC RX REV CODE- 250/636: Performed by: INTERNAL MEDICINE

## 2017-08-18 PROCEDURE — 74011250636 HC RX REV CODE- 250/636: Performed by: EMERGENCY MEDICINE

## 2017-08-18 PROCEDURE — 36415 COLL VENOUS BLD VENIPUNCTURE: CPT | Performed by: INTERNAL MEDICINE

## 2017-08-18 PROCEDURE — 65660000000 HC RM CCU STEPDOWN

## 2017-08-18 PROCEDURE — 82962 GLUCOSE BLOOD TEST: CPT

## 2017-08-18 PROCEDURE — 74011000250 HC RX REV CODE- 250: Performed by: INTERNAL MEDICINE

## 2017-08-18 PROCEDURE — 93005 ELECTROCARDIOGRAM TRACING: CPT

## 2017-08-18 PROCEDURE — 77030018836 HC SOL IRR NACL ICUM -A

## 2017-08-18 PROCEDURE — 84484 ASSAY OF TROPONIN QUANT: CPT | Performed by: INTERNAL MEDICINE

## 2017-08-18 PROCEDURE — 77030010545

## 2017-08-18 PROCEDURE — 74011250636 HC RX REV CODE- 250/636: Performed by: NURSE PRACTITIONER

## 2017-08-18 PROCEDURE — 87040 BLOOD CULTURE FOR BACTERIA: CPT | Performed by: INTERNAL MEDICINE

## 2017-08-18 PROCEDURE — 74011250637 HC RX REV CODE- 250/637: Performed by: NURSE PRACTITIONER

## 2017-08-18 PROCEDURE — C9113 INJ PANTOPRAZOLE SODIUM, VIA: HCPCS | Performed by: INTERNAL MEDICINE

## 2017-08-18 PROCEDURE — 74011000258 HC RX REV CODE- 258: Performed by: NURSE PRACTITIONER

## 2017-08-18 RX ORDER — VANCOMYCIN/0.9 % SOD CHLORIDE 1.5G/250ML
1500 PLASTIC BAG, INJECTION (ML) INTRAVENOUS EVERY 12 HOURS
Status: DISCONTINUED | OUTPATIENT
Start: 2017-08-19 | End: 2017-08-20

## 2017-08-18 RX ORDER — HYDROMORPHONE HYDROCHLORIDE 1 MG/ML
0.5 INJECTION, SOLUTION INTRAMUSCULAR; INTRAVENOUS; SUBCUTANEOUS
Status: DISCONTINUED | OUTPATIENT
Start: 2017-08-18 | End: 2017-08-19

## 2017-08-18 RX ORDER — HYDROMORPHONE HYDROCHLORIDE 1 MG/ML
0.5 INJECTION, SOLUTION INTRAMUSCULAR; INTRAVENOUS; SUBCUTANEOUS ONCE
Status: COMPLETED | OUTPATIENT
Start: 2017-08-18 | End: 2017-08-18

## 2017-08-18 RX ORDER — NITROGLYCERIN 0.4 MG/1
0.4 TABLET SUBLINGUAL AS NEEDED
Status: DISCONTINUED | OUTPATIENT
Start: 2017-08-18 | End: 2017-08-18 | Stop reason: SDUPTHER

## 2017-08-18 RX ADMIN — CASTOR OIL AND BALSAM, PERU: 788; 87 OINTMENT TOPICAL at 18:54

## 2017-08-18 RX ADMIN — Medication 10 ML: at 21:58

## 2017-08-18 RX ADMIN — ACETAMINOPHEN 1000 MG: 500 TABLET, FILM COATED ORAL at 16:17

## 2017-08-18 RX ADMIN — INSULIN LISPRO 2 UNITS: 100 INJECTION, SOLUTION INTRAVENOUS; SUBCUTANEOUS at 18:57

## 2017-08-18 RX ADMIN — INSULIN LISPRO 3 UNITS: 100 INJECTION, SOLUTION INTRAVENOUS; SUBCUTANEOUS at 12:59

## 2017-08-18 RX ADMIN — HYDROMORPHONE HYDROCHLORIDE 0.5 MG: 1 INJECTION, SOLUTION INTRAMUSCULAR; INTRAVENOUS; SUBCUTANEOUS at 03:25

## 2017-08-18 RX ADMIN — OXYCODONE HYDROCHLORIDE 10 MG: 5 TABLET ORAL at 01:56

## 2017-08-18 RX ADMIN — NITROGLYCERIN 0.4 MG: 0.4 TABLET SUBLINGUAL at 09:00

## 2017-08-18 RX ADMIN — Medication 10 ML: at 00:07

## 2017-08-18 RX ADMIN — INSULIN LISPRO 3 UNITS: 100 INJECTION, SOLUTION INTRAVENOUS; SUBCUTANEOUS at 10:39

## 2017-08-18 RX ADMIN — VANCOMYCIN HYDROCHLORIDE 1500 MG: 10 INJECTION, POWDER, LYOPHILIZED, FOR SOLUTION INTRAVENOUS at 23:34

## 2017-08-18 RX ADMIN — HYDROMORPHONE HYDROCHLORIDE 0.5 MG: 1 INJECTION, SOLUTION INTRAMUSCULAR; INTRAVENOUS; SUBCUTANEOUS at 16:09

## 2017-08-18 RX ADMIN — CARVEDILOL 3.12 MG: 3.12 TABLET, FILM COATED ORAL at 16:17

## 2017-08-18 RX ADMIN — Medication 10 ML: at 16:12

## 2017-08-18 RX ADMIN — DOCUSATE SODIUM AND SENNOSIDES 1 TABLET: 8.6; 5 TABLET, FILM COATED ORAL at 19:02

## 2017-08-18 RX ADMIN — SODIUM CHLORIDE 40 MG: 9 INJECTION INTRAMUSCULAR; INTRAVENOUS; SUBCUTANEOUS at 00:07

## 2017-08-18 RX ADMIN — CEFEPIME HYDROCHLORIDE 2 G: 2 INJECTION, POWDER, FOR SOLUTION INTRAVENOUS at 10:34

## 2017-08-18 RX ADMIN — ASPIRIN 81 MG: 81 TABLET, COATED ORAL at 10:42

## 2017-08-18 RX ADMIN — VANCOMYCIN HYDROCHLORIDE 2750 MG: 10 INJECTION, POWDER, LYOPHILIZED, FOR SOLUTION INTRAVENOUS at 12:56

## 2017-08-18 RX ADMIN — ACETAMINOPHEN 1000 MG: 500 TABLET, FILM COATED ORAL at 21:58

## 2017-08-18 RX ADMIN — DIAZEPAM 5 MG: 5 TABLET ORAL at 21:58

## 2017-08-18 RX ADMIN — CARVEDILOL 3.12 MG: 3.12 TABLET, FILM COATED ORAL at 10:42

## 2017-08-18 RX ADMIN — ENOXAPARIN SODIUM 40 MG: 40 INJECTION SUBCUTANEOUS at 10:46

## 2017-08-18 RX ADMIN — GABAPENTIN 600 MG: 300 CAPSULE ORAL at 16:17

## 2017-08-18 RX ADMIN — CEFEPIME HYDROCHLORIDE 2 G: 2 INJECTION, POWDER, FOR SOLUTION INTRAVENOUS at 18:40

## 2017-08-18 RX ADMIN — LIDOCAINE HYDROCHLORIDE 5 ML: 20 SOLUTION ORAL; TOPICAL at 18:57

## 2017-08-18 RX ADMIN — HYDROMORPHONE HYDROCHLORIDE 0.5 MG: 1 INJECTION, SOLUTION INTRAMUSCULAR; INTRAVENOUS; SUBCUTANEOUS at 19:17

## 2017-08-18 RX ADMIN — HYDROMORPHONE HYDROCHLORIDE 0.5 MG: 1 INJECTION, SOLUTION INTRAMUSCULAR; INTRAVENOUS; SUBCUTANEOUS at 23:37

## 2017-08-18 RX ADMIN — Medication 10 ML: at 05:08

## 2017-08-18 RX ADMIN — HYDROMORPHONE HYDROCHLORIDE 0.5 MG: 1 INJECTION, SOLUTION INTRAMUSCULAR; INTRAVENOUS; SUBCUTANEOUS at 08:55

## 2017-08-18 RX ADMIN — ONDANSETRON 4 MG: 2 INJECTION INTRAMUSCULAR; INTRAVENOUS at 02:05

## 2017-08-18 RX ADMIN — HYDROMORPHONE HYDROCHLORIDE 0.5 MG: 1 INJECTION, SOLUTION INTRAMUSCULAR; INTRAVENOUS; SUBCUTANEOUS at 08:11

## 2017-08-18 RX ADMIN — HYDROMORPHONE HYDROCHLORIDE 0.5 MG: 1 INJECTION, SOLUTION INTRAMUSCULAR; INTRAVENOUS; SUBCUTANEOUS at 12:08

## 2017-08-18 NOTE — PROGRESS NOTES
08/18/17 0813   Vital Signs   Temp 97.8 °F (36.6 °C)   Temp Source Axillary   Pulse (Heart Rate) (!) 107   Heart Rate Source Monitor   Resp Rate 24   O2 Sat (%) 98 %   Level of Consciousness Alert   BP (!) 151/101   MAP (Calculated) 118   MEWS Score 3     Patient in bed restless and screaming in pain. Patient confused. Notified MD and Isabella Upton NP, order received. Per NP, will come see the patient ASAP to evaluate. NP at bedside to evaluate patient. Patient to be transferred to PCU.

## 2017-08-18 NOTE — ED NOTES
TRANSFER - OUT REPORT:    Verbal report given to LYDIA Corcoran(name) on Estella Gil  being transferred to [facility](unit) for routine progression of care       Report consisted of patients Situation, Background, Assessment and   Recommendations(SBAR). Information from the following report(s) SBAR, ED Summary, Procedure Summary, Intake/Output, MAR, Accordion and Recent Results was reviewed with the receiving nurse. Lines:   Triple Lumen 08/17/17 Right;Upper Subclavian (Active)   Central Line Being Utilized Yes 8/17/2017  3:01 PM   Site Assessment Clean, dry, & intact 8/17/2017  3:01 PM   Infiltration Assessment 0 8/17/2017  3:01 PM   Dressing Status Clean, dry, & intact 8/17/2017  3:01 PM   Dressing Type Transparent 8/17/2017  3:01 PM   Proximal Hub Color/Line Status Capped 8/17/2017  3:01 PM   Positive Blood Return (Medial Site) Yes 8/17/2017  3:01 PM   Medial Hub Color/Line Status Capped 8/17/2017  3:01 PM   Positive Blood Return (Lateral Site) Yes 8/17/2017  3:01 PM   Distal Hub Color/Line Status Capped 8/17/2017  3:01 PM   Positive Blood Return (Site #3) Yes 8/17/2017  3:01 PM   External Catheter Length (cm) 5 centimeters 8/17/2017  3:01 PM       Peripheral IV 08/17/17 Left; Lower Wrist (Active)   Site Assessment Clean, dry, & intact 8/17/2017 11:38 AM   Phlebitis Assessment 0 8/17/2017 11:38 AM   Infiltration Assessment 0 8/17/2017 11:38 AM   Dressing Status Clean, dry, & intact 8/17/2017 11:38 AM   Dressing Type Transparent 8/17/2017 11:38 AM   Hub Color/Line Status Yellow 8/17/2017 11:38 AM   Alcohol Cap Used No 8/17/2017 11:38 AM        Opportunity for questions and clarification was provided.       Patient transported with:   [employer]

## 2017-08-18 NOTE — CONSULTS
Orthopedic CONSULT NOTE    Subjective:     Date of Consultation:  August 18, 2017      Sudhir Braswell is a 80 y.o. male who is being seen for left hip pain. Patient is very poor historian. History obtained from chart. Patient only able to convey left hip pain. Has old fracture. Has been seen by DR IVERSON [facility] from [facility]. Had MRi back in [facility] that shows severe arthritic changes. No images available to us. was to have the hip replaced but had to reschedule and it did not happen. No fever chills . Admitted now to medicine with chest and abdominal pain , elevated troponin .   History of DM, chest pain and vascular disease  Has been living in skilled nursing    Patient Active Problem List    Diagnosis Date Noted    Abdominal pain 08/17/2017    Elevated troponin 08/17/2017    NSTEMI, initial episode of care [facility]) 07/22/2017    Coronary artery disease involving native coronary artery of native heart without angina pectoris 07/14/2017    Essential hypertension 07/14/2017    Dyslipidemia 07/14/2017    Controlled type 2 diabetes mellitus without complication, without long-term current use of insulin ([facility] 07/14/2017    Abdominal aortic aneurysm (AAA) without rupture ([facility] 07/14/2017    Diabetic polyneuropathy associated with type 2 diabetes mellitus ([facility] 07/14/2017    Degenerative joint disease (DJD) of hip 07/14/2017    Leg pain 07/14/2017     Family History   Problem Relation Age of Onset    Tuberculosis Mother     Asthma Father     Coronary Artery Disease Sister     Asthma Sister       Social History   Substance Use Topics    Smoking status: Former Smoker     Types: Cigarettes     Quit date: 7/14/1997    Smokeless tobacco: Never Used    Alcohol use No     Past Medical History:   Diagnosis Date    AAA (abdominal aortic aneurysm) (HCC)     Asthma     CAD (coronary artery disease)     Mitzy MPI 7/14 LVEF 45, chronic inf-lat ischemia; Echo 7/14 LVEF 45, mod LVH, mild LAE, mild AI/MR    Diabetic polyneuropathy ([facility]     DJD (degenerative joint disease)     DM II (diabetes mellitus, type II), controlled ([facility]     Dyslipidemia     GERD (gastroesophageal reflux disease)     History of left bundle branch block (LBBB)     HTN (hypertension)     Myocardial infarction ([facility]     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    Pneumonia     Prostate CA ([facility] 2012    s/p XRT, hormonal      Past Surgical History:   Procedure Laterality Date    HX APPENDECTOMY      HX HEMORRHOIDECTOMY      HX PTCA      x 2 stents, vessel unknown, last 10 yrs ago      Prior to Admission medications    Medication Sig Start Date End Date Taking? Authorizing Provider   azithromycin (ZITHROMAX Z-TREY) 250 mg tablet 2 pills on day #1, 1 pill daily for days #2-5 8/13/17 8/18/17 Yes Neri Mcgill MD   DULoxetine (CYMBALTA) 30 mg capsule Take 1 Cap by mouth daily. 7/22/17  Yes Karon Bolanos MD   gabapentin (NEURONTIN) 600 mg tablet Take 1 Tab by mouth four (4) times daily. 7/22/17  Yes Karon Bolanos MD   glimepiride (AMARYL) 1 mg tablet Take 1 Tab by mouth every morning. 7/22/17  Yes Karon Bolanos MD   potassium chloride SR (KLOR-CON 10) 10 mEq tablet Take 1 Tab by mouth daily. 7/22/17  Yes Karon Bolanos MD   isosorbide mononitrate ER (IMDUR) 60 mg CR tablet Take 1 Tab by mouth every morning. 7/22/17  Yes Karon Bolanos MD   atorvastatin (LIPITOR) 10 mg tablet Take 1 Tab by mouth daily. 7/22/17  Yes Karon Bolanos MD   levothyroxine (SYNTHROID) 50 mcg tablet Take  by mouth Daily (before breakfast). Yes Historical Provider   metFORMIN (GLUCOPHAGE) 500 mg tablet Take  by mouth two (2) times daily (with meals). Yes Historical Provider   fluticasone-vilanterol (BREO ELLIPTA) 100-25 mcg/dose inhaler Take 1 Puff by inhalation daily. Yes Historical Provider   carvedilol (COREG) 3.125 mg tablet Take  by mouth two (2) times daily (with meals). Yes Historical Provider   montelukast (SINGULAIR) 10 mg tablet Take 10 mg by mouth daily.    Yes Historical Provider   aspirin delayed-release 81 mg tablet Take  by mouth daily. Yes Historical Provider   oxyCODONE-acetaminophen (PERCOCET) 5-325 mg per tablet Take 1 Tab by mouth every four (4) hours as needed for Pain. Armando Benitez MD   predniSONE (STERAPRED DS) 10 mg dose pack 6 pills daily for 3 days, then 4 pills daily for 3 days, then 2 pills daily for 3 days, then 1 pill daily for 3 days 8/13/17   Harjit Pedraza MD   nitroglycerin (NITROSTAT) 0.4 mg SL tablet 1 Tab by SubLINGual route as needed for Chest Pain. 7/22/17   Morenita Ndiaye MD   oxyCODONE-acetaminophen (PERCOCET) 7.5-325 mg per tablet Take 1 Tab by mouth Before breakfast, lunch, dinner and at bedtime. Max Daily Amount: 4 Tabs. 7/22/17   Morenita Ndiaye MD   oxyCODONE IR (OXY-IR) 15 mg immediate release tablet Take 1 Tab by mouth every four (4) hours as needed for Pain. Max Daily Amount: 90 mg. 7/22/17   Morenita Ndiaye MD   diazePAM (VALIUM) 5 mg tablet Take 1 Tab by mouth every twelve (12) hours as needed for Anxiety (muscle spasm). Max Daily Amount: 10 mg. 7/22/17   Morenita Ndiaye MD   tamsulosin [facility]) 0.4 mg capsule Take 0.8 mg by mouth daily. Historical Provider   diclofenac EC (VOLTAREN) 50 mg EC tablet Take  by mouth two (2) times a day.     Historical Provider     Current Facility-Administered Medications   Medication Dose Route Frequency    HYDROmorphone (PF) (DILAUDID) injection 0.5 mg  0.5 mg IntraVENous Q4H PRN    nitroglycerin (NITROSTAT) tablet 0.4 mg  0.4 mg SubLINGual PRN    cefepime (MAXIPIME) 2 g in 0.9% sodium chloride (MBP/ADV) 100 mL  2 g IntraVENous Q8H    vancomycin (VANCOCIN) 2,750 mg in 0.9% sodium chloride 500 mL IVPB  2,750 mg IntraVENous ONCE    [START ON 8/19/2017] vancomycin (VANCOCIN) 1500 mg in  ml infusion  1,500 mg IntraVENous Q12H    balsam peru-castor oil (VENELEX)  mg/gram ointment   Topical DAILY    aspirin delayed-release tablet 81 mg  81 mg Oral DAILY    atorvastatin (LIPITOR) tablet 10 mg  10 mg Oral DAILY    carvedilol (COREG) tablet 3.125 mg  3.125 mg Oral BID WITH MEALS    diazePAM (VALIUM) tablet 5 mg  5 mg Oral Q12H PRN    DULoxetine (CYMBALTA) capsule 30 mg  30 mg Oral DAILY    fluticasone-vilanterol (BREO ELLIPTA) 100mcg-25mcg/puff  1 Puff Inhalation DAILY    gabapentin (NEURONTIN) capsule 600 mg  600 mg Oral QID    isosorbide mononitrate ER (IMDUR) tablet 30 mg  30 mg Oral DAILY    levothyroxine (SYNTHROID) tablet 50 mcg  50 mcg Oral ACB    montelukast (SINGULAIR) tablet 10 mg  10 mg Oral DAILY    nitroglycerin (NITROSTAT) tablet 0.4 mg  0.4 mg SubLINGual PRN    tamsulosin (FLOMAX) capsule 0.8 mg  0.8 mg Oral DAILY    pantoprazole (PROTONIX) 40 mg in sodium chloride 0.9 % 10 mL injection  40 mg IntraVENous DAILY    ondansetron (ZOFRAN) injection 4 mg  4 mg IntraVENous Q4H PRN    sodium chloride (NS) flush 5-10 mL  5-10 mL IntraVENous Q8H    sodium chloride (NS) flush 5-10 mL  5-10 mL IntraVENous PRN    acetaminophen (TYLENOL) tablet 1,000 mg  1,000 mg Oral TID    fentaNYL (DURAGESIC) 12 mcg/hr patch 1 Patch  1 Patch TransDERmal Q72H    oxyCODONE IR (ROXICODONE) tablet 10 mg  10 mg Oral Q4H PRN    naloxone (NARCAN) injection 0.4 mg  0.4 mg IntraVENous PRN    enoxaparin (LOVENOX) injection 40 mg  40 mg SubCUTAneous DAILY    insulin lispro (HUMALOG) injection   SubCUTAneous AC&HS    glucose chewable tablet 16 g  4 Tab Oral PRN    glucagon (GLUCAGEN) injection 1 mg  1 mg IntraMUSCular PRN    dextrose 10% infusion 125-250 mL  125-250 mL IntraVENous PRN    senna-docusate (PERICOLACE) 8.6-50 mg per tablet 1 Tab  1 Tab Oral BID    polyethylene glycol (MIRALAX) packet 17 g  17 g Oral DAILY      Allergies   Allergen Reactions    Contrast Agent [Iodine] Hives    Levaquin [Levofloxacin] Unknown (comments)    Sulfa (Sulfonamide Antibiotics) Unknown (comments)        Review of Systems:  A comprehensive review of systems was negative except for that written in the HPI.     Mental Status: no dementia    Objective:     Patient Vitals for the past 8 hrs:   BP Temp Pulse Resp SpO2   17 0939 (!) (P) 130/92 - (!) (P) 105 - -   17 0901 145/90 97.2 °F (36.2 °C) (!) 107 26 93 %   17 0813 (!) 151/101 97.8 °F (36.6 °C) (!) 107 24 98 %   17 0400 138/84 98.3 °F (36.8 °C) 98 22 94 %     Temp (24hrs), Av °F (36.7 °C), Min:97.2 °F (36.2 °C), Max:98.3 °F (36.8 °C)      EXAM: alert,confused  laft hip TTP. Pain with IR and ER. All rom elicits moderate pain response. Imaging Review: XRpending       Labs:   Recent Results (from the past 24 hour(s))   CBC WITH AUTOMATED DIFF    Collection Time: 17 12:02 PM   Result Value Ref Range    WBC 13.6 (H) 4.1 - 11.1 K/uL    RBC 4.16 4.10 - 5.70 M/uL    HGB 13.1 12.1 - 17.0 g/dL    HCT 39.9 36.6 - 50.3 %    MCV 95.9 80.0 - 99.0 FL    MCH 31.5 26.0 - 34.0 PG    MCHC 32.8 30.0 - 36.5 g/dL    RDW 14.2 11.5 - 14.5 %    PLATELET 584 472 - 851 K/uL    NEUTROPHILS 77 (H) 32 - 75 %    LYMPHOCYTES 9 (L) 12 - 49 %    MONOCYTES 9 5 - 13 %    EOSINOPHILS 5 0 - 7 %    BASOPHILS 0 0 - 1 %    ABS. NEUTROPHILS 10.5 (H) 1.8 - 8.0 K/UL    ABS. LYMPHOCYTES 1.2 0.8 - 3.5 K/UL    ABS. MONOCYTES 1.2 (H) 0.0 - 1.0 K/UL    ABS. EOSINOPHILS 0.7 (H) 0.0 - 0.4 K/UL    ABS. BASOPHILS 0.0 0.0 - 0.1 K/UL    DF SMEAR SCANNED      RBC COMMENTS NORMOCYTIC, NORMOCHROMIC     METABOLIC PANEL, COMPREHENSIVE    Collection Time: 17 12:02 PM   Result Value Ref Range    Sodium 137 136 - 145 mmol/L    Potassium 4.4 3.5 - 5.1 mmol/L    Chloride 101 97 - 108 mmol/L    CO2 27 21 - 32 mmol/L    Anion gap 9 5 - 15 mmol/L    Glucose 123 (H) 65 - 100 mg/dL    BUN 31 (H) 7.0 - 18.0 MG/DL    Creatinine 0.96 0.70 - 1.30 MG/DL    BUN/Creatinine ratio 32 (H) 12 - 20      GFR est AA >60 >60 ml/min/1.73m2    GFR est non-AA >60 >60 ml/min/1.73m2    Calcium 8.7 8.5 - 10.1 MG/DL    Bilirubin, total 0.6 0.2 - 1.0 MG/DL    ALT (SGPT) 39 14 - 63 U/L    AST (SGOT) 104 (H) 15 - 37 U/L    Alk.  phosphatase 101 45 - 117 U/L    Protein, total 6.3 (L) 6.4 - 8.2 g/dL    Albumin 2.0 (L) 3.5 - 5.0 g/dL    Globulin 4.3 (H) 2.0 - 4.0 g/dL    A-G Ratio 0.5 (L) 1.1 - 2.2     LIPASE    Collection Time: 08/17/17 12:02 PM   Result Value Ref Range    Lipase 82 73 - 393 U/L   TROPONIN I    Collection Time: 08/17/17 12:02 PM   Result Value Ref Range    Troponin-I, Qt. 0.32 (H) <0.08 ng/mL   URINALYSIS W/MICROSCOPIC    Collection Time: 08/17/17 12:09 PM   Result Value Ref Range    Color YELLOW/STRAW      Appearance CLEAR CLEAR      Specific gravity 1.015 1.003 - 1.030      pH (UA) 6.5 5.0 - 8.0      Protein 30 (A) NEG mg/dL    Glucose NEGATIVE  NEG mg/dL    Ketone 15 (A) NEG mg/dL    Bilirubin NEGATIVE  NEG      Blood NEGATIVE  NEG      Urobilinogen 1.0 0.2 - 1.0 EU/dL    Nitrites NEGATIVE  NEG      Leukocyte Esterase NEGATIVE  NEG      WBC 0-4 0 - 4 /hpf    RBC 0-5 0 - 5 /hpf    Epithelial cells FEW FEW /lpf    Bacteria NEGATIVE  NEG /hpf   EKG, 12 LEAD, SUBSEQUENT    Collection Time: 08/17/17  1:24 PM   Result Value Ref Range    Ventricular Rate 90 BPM    Atrial Rate 90 BPM    P-R Interval 196 ms    QRS Duration 102 ms    Q-T Interval 402 ms    QTC Calculation (Bezet) 491 ms    Calculated P Axis 80 degrees    Calculated R Axis 11 degrees    Calculated T Axis 112 degrees    Diagnosis       Normal sinus rhythm  Inferior infarct , age undetermined  T wave inversion V2-V5  Abnormal ECG  When compared with ECG of 17-AUG-2017 11:01,  MANUAL COMPARISON REQUIRED, DATA IS UNCONFIRMED  Confirmed by Faisal Dietz MD, --- ([id number]) on 8/17/2017 8:09:25 PM     PROTHROMBIN TIME + INR    Collection Time: 08/17/17  2:39 PM   Result Value Ref Range    INR 1.4 (H) 0.9 - 1.1      Prothrombin time 15.7 (H) 10.0 - 13.0 sec   PTT    Collection Time: 08/17/17  2:39 PM   Result Value Ref Range    aPTT 20.4 (L) 23.0 - 33.5 SEC   EKG, 12 LEAD, INITIAL    Collection Time: 08/17/17  4:49 PM   Result Value Ref Range    Ventricular Rate 87 BPM    Atrial Rate 87 BPM    P-R Interval 192 ms    QRS Duration 108 ms    Q-T Interval 418 ms    QTC Calculation (Bezet) 502 ms    Calculated P Axis 8 degrees    Calculated R Axis -3 degrees    Calculated T Axis 99 degrees    Diagnosis       Normal sinus rhythm with sinus arrhythmia  Inferior infarct , age undetermined  T wave abnormality, consider anterolateral ischemia  Prolonged QT  When compared with ECG of 17-AUG-2017 13:24,  MANUAL COMPARISON REQUIRED, DATA IS UNCONFIRMED     TROPONIN I    Collection Time: 08/17/17  5:30 PM   Result Value Ref Range    Troponin-I, Qt. 0.54 (H) <0.05 ng/mL   CK W/ CKMB & INDEX    Collection Time: 08/17/17  5:30 PM   Result Value Ref Range    CK 23 (L) 39 - 308 U/L    CK - MB 1.8 <3.6 NG/ML    CK-MB Index 7.8 (H) 0 - 2.5     TROPONIN I    Collection Time: 08/18/17  2:09 AM   Result Value Ref Range    Troponin-I, Qt. 0.55 (H) <5.33 ng/mL   METABOLIC PANEL, COMPREHENSIVE    Collection Time: 08/18/17  2:09 AM   Result Value Ref Range    Sodium 134 (L) 136 - 145 mmol/L    Potassium 3.8 3.5 - 5.1 mmol/L    Chloride 100 97 - 108 mmol/L    CO2 27 21 - 32 mmol/L    Anion gap 7 5 - 15 mmol/L    Glucose 131 (H) 65 - 100 mg/dL    BUN 23 (H) 6 - 20 MG/DL    Creatinine 0.67 (L) 0.70 - 1.30 MG/DL    BUN/Creatinine ratio 34 (H) 12 - 20      GFR est AA >60 >60 ml/min/1.73m2    GFR est non-AA >60 >60 ml/min/1.73m2    Calcium 7.9 (L) 8.5 - 10.1 MG/DL    Bilirubin, total 0.6 0.2 - 1.0 MG/DL    ALT (SGPT) 28 12 - 78 U/L    AST (SGOT) 54 (H) 15 - 37 U/L    Alk.  phosphatase 87 45 - 117 U/L    Protein, total 6.1 (L) 6.4 - 8.2 g/dL    Albumin 1.9 (L) 3.5 - 5.0 g/dL    Globulin 4.2 (H) 2.0 - 4.0 g/dL    A-G Ratio 0.5 (L) 1.1 - 2.2     CBC WITH AUTOMATED DIFF    Collection Time: 08/18/17  2:09 AM   Result Value Ref Range    WBC 15.9 (H) 4.1 - 11.1 K/uL    RBC 3.74 (L) 4.10 - 5.70 M/uL    HGB 11.9 (L) 12.1 - 17.0 g/dL    HCT 35.7 (L) 36.6 - 50.3 %    MCV 95.5 80.0 - 99.0 FL    MCH 31.8 26.0 - 34.0 PG    MCHC 33.3 30.0 - 36.5 g/dL    RDW 14.1 11.5 - 14.5 %    PLATELET 028 202 - 698 K/uL    NEUTROPHILS 76 %    LYMPHOCYTES 12 %    MONOCYTES 11 %    EOSINOPHILS 1 %    BASOPHILS 0 %    ABS. NEUTROPHILS 12.1 K/UL    ABS. LYMPHOCYTES 1.9 K/UL    ABS. MONOCYTES 1.7 K/UL    ABS. EOSINOPHILS 0.2 K/UL    ABS.  BASOPHILS 0.0 K/UL    PLATELET COMMENTS LARGE PLATELETS      RBC COMMENTS NORMOCYTIC, NORMOCHROMIC      WBC COMMENTS REACTIVE LYMPHS      DF MANUAL     HEMOGLOBIN A1C WITH EAG    Collection Time: 08/18/17  2:09 AM   Result Value Ref Range    Hemoglobin A1c 6.6 (H) 4.2 - 6.3 %    Est. average glucose 143 mg/dL   NUCLEATED RBC    Collection Time: 08/18/17  2:09 AM   Result Value Ref Range    NRBC 0.0 0  WBC    ABSOLUTE NRBC 0.00 0.00 - 0.01 K/uL   GLUCOSE, POC    Collection Time: 08/18/17  8:16 AM   Result Value Ref Range    Glucose (POC) 220 (H) 65 - 100 mg/dL    Performed by Coral Stovall (PCT)    EKG, 12 LEAD, INITIAL    Collection Time: 08/18/17  9:33 AM   Result Value Ref Range    Ventricular Rate 108 BPM    Atrial Rate 108 BPM    P-R Interval 168 ms    QRS Duration 104 ms    Q-T Interval 404 ms    QTC Calculation (Bezet) 541 ms    Calculated P Axis -9 degrees    Calculated R Axis -12 degrees    Calculated T Axis 127 degrees    Diagnosis       Sinus tachycardia with Possible premature atrial complexes with aberrant   conduction  Inferior infarct , age undetermined  T wave abnormality, consider anterolateral ischemia  When compared with ECG of 17-AUG-2017 16:49,  MANUAL COMPARISON REQUIRED, DATA IS UNCONFIRMED           Impression:     Patient Active Problem List    Diagnosis Date Noted    Abdominal pain 08/17/2017    Elevated troponin 08/17/2017    NSTEMI, initial episode of care [facility]) 07/22/2017    Coronary artery disease involving native coronary artery of native heart without angina pectoris 07/14/2017    Essential hypertension 07/14/2017    Dyslipidemia 07/14/2017    Controlled type 2 diabetes mellitus without complication, without long-term current use of insulin ([facility] 07/14/2017    Abdominal aortic aneurysm (AAA) without rupture ([facility] 07/14/2017    Diabetic polyneuropathy associated with type 2 diabetes mellitus ([facility] 07/14/2017    Degenerative joint disease (DJD) of hip 07/14/2017    Leg pain 07/14/2017     Active Problems:    Coronary artery disease involving native coronary artery of native heart without angina pectoris (7/14/2017)      Essential hypertension (7/14/2017)      Abdominal pain (8/17/2017)      Elevated troponin (8/17/2017)        Plan:   Admitted to medicine  Left hip pain and severe djd  Ordered new  images here . Would expect to see progression of the arthritic changes seen in may. Elective hip replacement would be ideal but hes not a good candidate with his current medical presentation . Bedrest and medical management for now. Dr. Tori Samuel aware and agree with above plan.       Kang Fierro [place]

## 2017-08-18 NOTE — ED NOTES
Moved pt up in bed and repositioned. Pt continuously moans but has no actual complaints that he will report. He states he just wants to go to sleep.

## 2017-08-18 NOTE — ED NOTES
Assumed care of patient from [address] [place] [address]. Patient resting comfortably in no apparent distress. On monitor x 3. Call bell within reach. Plan of care discussed.

## 2017-08-18 NOTE — PROGRESS NOTES
TRANSFER - IN REPORT:    Verbal report received from LYDIA Hull(name) on Lalito Harden  being received from ED(unit) for routine progression of care      Report consisted of patients Situation, Background, Assessment and   Recommendations(SBAR). Information from the following report(s) SBAR, Kardex, ED Summary, Intake/Output, MAR, Recent Results and Cardiac Rhythm NSR was reviewed with the receiving nurse. Opportunity for questions and clarification was provided. Assessment completed upon patients arrival to unit and care assumed. Primary Nurse Cal Correia and Arsalan Pineda RN performed a dual skin assessment on this patient Impairment noted- see wound doc flow sheet  Kwan score is 13      0250 Walked into pt room and found pt with blood dripping from his mouth and a small amount splattered on his chest. Quarter sized dollop of thickened, red sputum present on chest. Cleaned pt and suctioned to try to identify source of bleeding. On call physician made aware and advised to monitor pt.    0300 Pt complaining of 10/10 pain. Moaning and yelling in bed. Roxicodone 10mg PO administered to pt at approximately 0135 with no relief. Pt had Duragesic patch placed in ED [facility] ED. Contacted on call physician. Dr Hansa Baker ordered Dilaudid 0.5mg IV every 4 hours prn for breakthrough pain.  is aware of Duragesic patch and Roxicodone administered to pt. Advised to administer Narcan if needed. Will administer Dilaudid to pt and monitor. 0400 Pt resting quietly. VS stable. No signs of blood present in pt mouth. Bedside shift change report given to Demarcus Hawkins RN (oncoming nurse) by Doris Henderson RN (offgoing nurse). Report included the following information SBAR, Kardex, Intake/Output, MAR, Recent Results and Cardiac Rhythm NSR.

## 2017-08-18 NOTE — PROGRESS NOTES
Speech path  Attempted two times to evaluate this pt. He was in too much pain the first time. He was medicated by nsg and then his family wanted him to rest. His mouth and hip hurt him by his report. He had an allergic reaction to meds and got mouth sores. Magic mouth wash has been ordered and should be helpful with pain. We will follow up Monday. Nsg can complete the STAND.    Mary Jane Vo, SLP

## 2017-08-18 NOTE — PROGRESS NOTES
Hospitalist Progress Note    NAME: Lalito Harden   :  1932   MRN:  [id number]       Interim Hospital Summary: 80 y.o. male whom presented on 2017 with      Assessment / Plan:  Chest Pain  Elevated Troponin  History of CAD s/p remote PCI  Stress Test negative on 17  Hyperlipidemia  - pt pointed substernal chest pain this morning. Pain relieved one tablet of SL NTG.  - EKG with TWI in V1-V5  - troponin went up to 0.55 from 0.32  - cardiology following; plan to take him to cardiac cath this afternoon, NPO   - Echo: Ejection fraction was estimated in the range of 15 % to 20 %  - continue ASA, BB& IMDUR  - continue statin    Abdominal Pain: now resolved  Nausea/vomiting  - CT Abdomen/Pelvis: Distended gallbladder. Infrarenal abdominal aortic aneurysm measures 5.3 x 5 cm (no changes from previous images), Colonic diverticulosis. - Abdominal ultrasound:Sludge layers dependently in the nondistended gallbladder. No wall thickening or pericholecystic fluid.     - Avoid NSAID  - IV PPI & IV antiemetics  - stop diclofenac     Chronic Impacted Left Femoral Head Fracture: ideally needs total hip replacement to control pain  - start low dose fentanyl patch  - scheduled Acetaminophen  - prn Oxycodone  - Dr. Fátima Johns spoke with Dr. Lenard Correia in Radiology to get addendum added on to patient CT A/P which was preformed at [facility] detailing patient's left hip pathology  - ortho consult     Sacral Decub:  - consult wound care     Type 2 diabetes mellitus without complication, without long-term current use of insulin   Diabetic polyneuropathy  - hold home oral antihyperglycemics and place on SSI  - Hgb A1c 6.6  - continue home Gabapentin     Abdominal aortic aneurysm (AAA)   - stable on today's CT:Infrarenal abdominal aortic aneurysm measures 5.3 x 5 cm      Obesity  Severe Protein Calorie Malnutrition  -consult Nutrition     Code Status: Patient reports that he wishes to be Full Code  Surrogate Decision Maker: Wife, attempted to call, no answer     DVT Prophylaxis: Lovenox  GI Prophylaxis: PPI as above     Baseline: Bed bound for last 3 months per patient report    Recommended Disposition: tbd     Subjective:     Chief Complaint / Reason for Physician Visit  The patient was screaming due to pain when I visited around [address] became quite after taking 1 tab of SL NTG. Not following commends. Discussed with RN events overnight. Review of Systems:  Symptom Y/N Comments  Symptom Y/N Comments   Fever/Chills    Chest Pain     Poor Appetite    Edema     Cough    Abdominal Pain     Sputum    Joint Pain     SOB/LEWIS    Pruritis/Rash     Nausea/vomit    Tolerating PT/OT     Diarrhea    Tolerating Diet     Constipation    Other       Could NOT obtain due to:      Objective:     VITALS:   Last 24hrs VS reviewed since prior progress note. Most recent are:  Patient Vitals for the past 24 hrs:   Temp Pulse Resp BP SpO2   08/18/17 0939 - (!) (P) 105 - (!) (P) 130/92 -   08/18/17 0901 97.2 °F (36.2 °C) (!) 107 26 145/90 93 %   08/18/17 0813 97.8 °F (36.6 °C) (!) 107 24 (!) 151/101 98 %   08/18/17 0400 98.3 °F (36.8 °C) 98 22 138/84 94 %   08/18/17 0244 - 99 20 171/88 93 %   08/18/17 0135 98.2 °F (36.8 °C) 99 18 (!) 143/97 91 %   08/18/17 0108 - (!) 103 15 114/71 94 %   08/17/17 1900 - 90 24 125/71 91 %   08/17/17 1830 - 93 21 (!) 137/93 (!) 86 %   08/17/17 1812 - (!) 116 (!) 36 145/84 95 %   08/17/17 1745 - 91 20 146/89 99 %   08/17/17 1732 - 89 17 142/82 96 %   08/17/17 1715 98.3 °F (36.8 °C) 92 16 - 95 %       Intake/Output Summary (Last 24 hours) at 08/18/17 1043  Last data filed at 08/18/17 0400   Gross per 24 hour   Intake              170 ml   Output              425 ml   Net             -255 ml        PHYSICAL EXAM:  General: WD, WN. Alert, un-cooperative, was in distress due to chest pain  EENT:  EOMI. Anicteric sclerae. Dry mucus membrane  Resp:  CTA bilaterally, no wheezing or rales.   No accessory muscle use  CV:  Regular rhythm,  No edema  GI:  Soft, obese, Non tender.  +Bowel sounds  Neurologic:  Alert. Not following commends  Psych:   No insight. Not anxious nor agitated  Skin:  No rashes. No jaundice    Reviewed most current lab test results and cultures  YES  Reviewed most current radiology test results   YES  Review and summation of old records today    NO  Reviewed patient's current orders and MAR    YES  PMH/SH reviewed - no change compared to H&P  ________________________________________________________________________  Care Plan discussed with:    Comments   Patient y    Family      RN y    Care Manager y    Consultant  y Cardiology NP, Ms. Colleenpat Marcelino                    y Multidiciplinary team rounds were held today with , nursing, pharmacist and clinical coordinator. Patient's plan of care was discussed; medications were reviewed and discharge planning was addressed. ________________________________________________________________________  Total NON critical care TIME:  35  Minutes    Total CRITICAL CARE TIME Spent:   Minutes non procedure based      Comments   >50% of visit spent in counseling and coordination of care     ________________________________________________________________________  Leo Stacy NP     Procedures: see electronic medical records for all procedures/Xrays and details which were not copied into this note but were reviewed prior to creation of Plan. LABS:  I reviewed today's most current labs and imaging studies.   Pertinent labs include:  Recent Labs      08/18/17   0209  08/17/17   1202   WBC  15.9*  13.6*   HGB  11.9*  13.1   HCT  35.7*  39.9   PLT  223  183     Recent Labs      08/18/17   0209  08/17/17   1439  08/17/17   1202   NA  134*   --   137   K  3.8   --   4.4   CL  100   --   101   CO2  27   --   27   GLU  131*   --   123*   BUN  23*   --   31*   CREA  0.67*   --   0.96   CA  7.9*   --   8.7   ALB  1.9*   --   2.0*   TBILI  0.6   --   0.6   SGOT  54*   --   104* ALT  28   --   39   INR   --   1.4*   --        Signed: )Torres Saavedra NP

## 2017-08-18 NOTE — ED NOTES
Pt has been constantly moaning and will not report anything hurting or being wrong except that he wants to get in a bed and cannot sleep. Pt has to continuously be reminded and reports understanding that we do not have a room assigned to him yet and that is what we are waiting on.

## 2017-08-18 NOTE — H&P
Hospitalist Admission Note    NAME: Loida Lewis   :  1932   MRN:  [id number]     Date/Time:  2017 11:16 PM    Patient PCP: Gayatri Tesfaye MD  ________________________________________________________________________    My assessment of this patient's clinical condition and my plan of care is as follows. Assessment / Plan:  Abdominal Pain: now resolved  Vomiting: CT Abdomen/Pelvis only suggested distended gallbladder and Abdominal ultrasound showed gallbladder sludge without distension, negative rocha's sign, LFT's okay.  Question gastritis related to Chronic uncontrolled pain and NSAID use  -clear liquid diet  -IV PPI  -IV antiemetics  -stop diclofenac    Elevated Troponin  History of CAD s/p remote PCI  Stress Test negative on 17  -EKG with TWI in V1-V5  -consult cardiology and check Tn again tomorrow morning and TTE ordered  -continue home Coreg and Imdur dose decreased   -continue ASA  -continue home statin for Hyperlipidemia    Chronic Impacted Left Femoral Head Fracture: ideally needs total hip replacement to control pain  -start low dose fentanyl patch  -scheduled Acetaminophen  -prn Oxycodone  -I spoke with Dr. Agnes Fernandes in Radiology to get addendum added on to patient CT A/P which was preformed at [facility] detailing patient's left hip pathology    Sacral Decub:  -consult wound care    Type 2 diabetes mellitus without complication, without long-term current use of insulin   Diabetic polyneuropathy  -hold home oral antihyperglycemics and place on SSI and check A1c  -continue home Gabapentin    Abdominal aortic aneurysm (AAA)   -stable on today's CT  \"Infrarenal abdominal aortic aneurysm measures 5.3 x 5 cm\"     Obesity  Severe Protein Calorie Malnutrition  -consult Nutrition    Code Status: Patient reports that he wishes to be Full Code  Surrogate Decision Maker: Wife, attempted to call, no answer    DVT Prophylaxis: Lovenox  GI Prophylaxis: PPI as above    Baseline: Bed bound for last 3 months per patient report        Subjective:   CHIEF COMPLAINT: abdominal pain    HISTORY OF PRESENT ILLNESS:     Erica Navarro is a 80 y.o.  male who presents as a transfer from [facility]. Patient presented there earlier this morning with complaint of vomiting and abdominal pain. Patient moans constantly and obtaining a good history is very difficult. In between these moans seems a paucity of thought as patient stutters to answer questions. Occasionally, he will flow a sentence appropriately but this is rare. After significant questioning patient reports that his left hip is in pain and has been for the last 3 months. He states that when he was suppose to get his hip fixed there was a relative named Arben Fatima who passed away and he deferred surgery to make the  services. Per chart review, patient's hip was last mentioned in his medical record in July after patient was in the hospital following stress test related CP. This documentation stated that patient had severe left hip arthritis. Patient also complained of CP earlier at [facility] but denies any CP at this time. He will occasionally have the urge to vomit during my interview but keeps asking for water. Patient denies any SOB or pain with eating. He denies any fevers. He states that it has been 5-6 days since he had a BM. Patient was sent to [facility] because his Troponin was mildly elevated. He also had a CT at [facility] which stated that his gallbladder was distended but abdominal ultrasound in the [facility] ED only showed gallbladder sludge without distension. We were asked to admit for work up and evaluation of the above problems.      Past Medical History:   Diagnosis Date    AAA (abdominal aortic aneurysm) (HCC)     Asthma     CAD (coronary artery disease)     [facility]  LVEF 45, chronic inf-lat ischemia; Echo  LVEF 45, mod LVH, mild LAE, mild AI/MR    Diabetic polyneuropathy (HCC)     DJD (degenerative joint disease)     DM II (diabetes mellitus, type II), controlled ([facility] [address]     Dyslipidemia     GERD (gastroesophageal reflux disease)     History of left bundle branch block (LBBB)     HTN (hypertension)     Myocardial infarction ([facility] [address]     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    Pneumonia     Prostate CA ([facility] [address] 2012    s/p XRT, hormonal        Past Surgical History:   Procedure Laterality Date    HX APPENDECTOMY      HX HEMORRHOIDECTOMY      HX PTCA      x 2 stents, vessel unknown, last 10 yrs ago       Social History   Substance Use Topics    Smoking status: Former Smoker     Types: Cigarettes     Quit date: 7/14/1997    Smokeless tobacco: Never Used    Alcohol use No        Family History   Problem Relation Age of Onset    Tuberculosis Mother     Asthma Father     Coronary Artery Disease Sister     Asthma Sister      Allergies   Allergen Reactions    Contrast Agent [Iodine] Hives    Levaquin [Levofloxacin] Unknown (comments)    Sulfa (Sulfonamide Antibiotics) Unknown (comments)        Prior to Admission medications    Medication Sig Start Date End Date Taking? Authorizing Provider   oxyCODONE-acetaminophen (PERCOCET) 5-325 mg per tablet Take 1 Tab by mouth every four (4) hours as needed for Pain. Armando Benitez, MD   azithromycin (ZITHROMAX Z-TREY) 250 mg tablet 2 pills on day #1, 1 pill daily for days #2-5 8/13/17 8/18/17  Shiva Rodriges MD   predniSONE (STERAPRED DS) 10 mg dose pack 6 pills daily for 3 days, then 4 pills daily for 3 days, then 2 pills daily for 3 days, then 1 pill daily for 3 days 8/13/17   Shiva Rodriges MD   DULoxetine (CYMBALTA) 30 mg capsule Take 1 Cap by mouth daily. 7/22/17   Minal Salgado MD   gabapentin (NEURONTIN) 600 mg tablet Take 1 Tab by mouth four (4) times daily.  7/22/17   Minal Salgado MD   glimepiride (AMARYL) 1 mg tablet Take 1 Tab by mouth every morning. 7/22/17   Minal Salgado MD   nitroglycerin (NITROSTAT) 0.4 mg SL tablet 1 Tab by SubLINGual route as needed for Chest Pain. 7/22/17   Minal Salgado MD potassium chloride SR (KLOR-CON 10) 10 mEq tablet Take 1 Tab by mouth daily. 7/22/17   Devin Dolan MD   oxyCODONE-acetaminophen (PERCOCET) 7.5-325 mg per tablet Take 1 Tab by mouth Before breakfast, lunch, dinner and at bedtime. Max Daily Amount: 4 Tabs. 7/22/17   Devin Dolan MD   oxyCODONE IR (OXY-IR) 15 mg immediate release tablet Take 1 Tab by mouth every four (4) hours as needed for Pain. Max Daily Amount: 90 mg. 7/22/17   Devin Dolan MD   diazePAM (VALIUM) 5 mg tablet Take 1 Tab by mouth every twelve (12) hours as needed for Anxiety (muscle spasm). Max Daily Amount: 10 mg. 7/22/17   Devin Dolan MD   isosorbide mononitrate ER (IMDUR) 60 mg CR tablet Take 1 Tab by mouth every morning. 7/22/17   Devin Dolan MD   atorvastatin (LIPITOR) 10 mg tablet Take 1 Tab by mouth daily. 7/22/17   Devin Dolan MD   tamsulosin [facility]) 0.4 mg capsule Take 0.8 mg by mouth daily. Historical Provider   levothyroxine (SYNTHROID) 50 mcg tablet Take  by mouth Daily (before breakfast). Historical Provider   metFORMIN (GLUCOPHAGE) 500 mg tablet Take  by mouth two (2) times daily (with meals). Historical Provider   fluticasone-vilanterol (BREO ELLIPTA) 100-25 mcg/dose inhaler Take 1 Puff by inhalation daily. Historical Provider   carvedilol (COREG) 3.125 mg tablet Take  by mouth two (2) times daily (with meals). Historical Provider   montelukast (SINGULAIR) 10 mg tablet Take 10 mg by mouth daily. Historical Provider   aspirin delayed-release 81 mg tablet Take  by mouth daily. Historical Provider   diclofenac EC (VOLTAREN) 50 mg EC tablet Take  by mouth two (2) times a day. Historical Provider       REVIEW OF SYSTEMS:     I am not able to complete the review of systems because:    The patient is intubated and sedated    The patient has altered mental status due to his acute medical problems    The patient has baseline aphasia from prior stroke(s)    The patient has baseline dementia and is not reliable historian    The patient is in acute medical distress and unable to provide information           Objective:   VITALS:    Visit Vitals    /71    Pulse 90    Temp 98.3 °F (36.8 °C)    Resp 24    SpO2 91%       PHYSICAL EXAM:    General:    Alert, obese, mostly cooperative, moderate distress and moans in pain approximately 30-40 times per minute when not answering questions, appears stated age. HEENT: Atraumatic, anicteric sclerae, pink conjunctivae     No oral ulcers, mucosa dryt, throat clear, dentition fair  Neck:  Supple, symmetrical,  Thyroid not enlarged  Lungs:   Clear to auscultation bilaterally on anterior and lateral assessment. No Wheezing or Rhonchi. No rales. Chest wall:  No tenderness  No Accessory muscle use. Heart:   Regular  rhythm,  No  murmur   No edema  Abdomen:   Soft, non-tender. Not distended. Bowel sounds hypoactive  Extremities: Left leg is externally rotated, distal pulses palpable  Skin:     Not pale. Not Jaundiced  Sacral Decub not examined  Psych:  Fair insight. Questionably depressed. + anxious but not agitated. Neurologic: EOMs intact. No facial asymmetry. No aphasia or slurred speech (see HPI as above for description of speech flow). Doesn't move left leg, Moves other extrmeities.  Alert and oriented.     _______________________________________________________________________  Care Plan discussed with:    Comments   Patient x    Family      RN x    Care Manager                    Consultant:  x Radiology and ED Physician   _______________________________________________________________________  Expected  Disposition:   Home with Family    HH/PT/OT/RN    SNF/LTC x   [facility]    ________________________________________________________________________  TOTAL TIME:  76 Minutes    Critical Care Provided     Minutes non procedure based      Comments    x Reviewed previous records   >50% of visit spent in counseling and coordination of care x Discussion with patient and/or family and questions answered       ________________________________________________________________________  Signed: Olivier Ware MD    Procedures: see electronic medical records for all procedures/Xrays and details which were not copied into this note but were reviewed prior to creation of Plan. LAB DATA REVIEWED:    Recent Results (from the past 24 hour(s))   EKG, 12 LEAD, INITIAL    Collection Time: 08/17/17 11:01 AM   Result Value Ref Range    Ventricular Rate 85 BPM    Atrial Rate 85 BPM    P-R Interval 204 ms    QRS Duration 102 ms    Q-T Interval 412 ms    QTC Calculation (Bezet) 490 ms    Calculated P Axis 87 degrees    Calculated R Axis 13 degrees    Calculated T Axis 109 degrees    Diagnosis       Normal sinus rhythm  Q waves in inferior leads  T wave inversion in   V1-V5  Abnormal ECG  When compared with ECG of 13-AUG-2017 02:17,  Serial changes of Inferior infarct present  Confirmed by Jessica Banegas MD, --- ([id number]) on 8/17/2017 8:01:56 PM     CBC WITH AUTOMATED DIFF    Collection Time: 08/17/17 12:02 PM   Result Value Ref Range    WBC 13.6 (H) 4.1 - 11.1 K/uL    RBC 4.16 4.10 - 5.70 M/uL    HGB 13.1 12.1 - 17.0 g/dL    HCT 39.9 36.6 - 50.3 %    MCV 95.9 80.0 - 99.0 FL    MCH 31.5 26.0 - 34.0 PG    MCHC 32.8 30.0 - 36.5 g/dL    RDW 14.2 11.5 - 14.5 %    PLATELET 696 318 - 176 K/uL    NEUTROPHILS 77 (H) 32 - 75 %    LYMPHOCYTES 9 (L) 12 - 49 %    MONOCYTES 9 5 - 13 %    EOSINOPHILS 5 0 - 7 %    BASOPHILS 0 0 - 1 %    ABS. NEUTROPHILS 10.5 (H) 1.8 - 8.0 K/UL    ABS. LYMPHOCYTES 1.2 0.8 - 3.5 K/UL    ABS. MONOCYTES 1.2 (H) 0.0 - 1.0 K/UL    ABS. EOSINOPHILS 0.7 (H) 0.0 - 0.4 K/UL    ABS.  BASOPHILS 0.0 0.0 - 0.1 K/UL    DF SMEAR SCANNED      RBC COMMENTS NORMOCYTIC, NORMOCHROMIC     METABOLIC PANEL, COMPREHENSIVE    Collection Time: 08/17/17 12:02 PM   Result Value Ref Range    Sodium 137 136 - 145 mmol/L    Potassium 4.4 3.5 - 5.1 mmol/L    Chloride 101 97 - 108 mmol/L    CO2 27 21 - 32 mmol/L Anion gap 9 5 - 15 mmol/L    Glucose 123 (H) 65 - 100 mg/dL    BUN 31 (H) 7.0 - 18.0 MG/DL    Creatinine 0.96 0.70 - 1.30 MG/DL    BUN/Creatinine ratio 32 (H) 12 - 20      GFR est AA >60 >60 ml/min/1.73m2    GFR est non-AA >60 >60 ml/min/1.73m2    Calcium 8.7 8.5 - 10.1 MG/DL    Bilirubin, total 0.6 0.2 - 1.0 MG/DL    ALT (SGPT) 39 14 - 63 U/L    AST (SGOT) 104 (H) 15 - 37 U/L    Alk.  phosphatase 101 45 - 117 U/L    Protein, total 6.3 (L) 6.4 - 8.2 g/dL    Albumin 2.0 (L) 3.5 - 5.0 g/dL    Globulin 4.3 (H) 2.0 - 4.0 g/dL    A-G Ratio 0.5 (L) 1.1 - 2.2     LIPASE    Collection Time: 08/17/17 12:02 PM   Result Value Ref Range    Lipase 82 73 - 393 U/L   TROPONIN I    Collection Time: 08/17/17 12:02 PM   Result Value Ref Range    Troponin-I, Qt. 0.32 (H) <0.08 ng/mL   URINALYSIS W/MICROSCOPIC    Collection Time: 08/17/17 12:09 PM   Result Value Ref Range    Color YELLOW/STRAW      Appearance CLEAR CLEAR      Specific gravity 1.015 1.003 - 1.030      pH (UA) 6.5 5.0 - 8.0      Protein 30 (A) NEG mg/dL    Glucose NEGATIVE  NEG mg/dL    Ketone 15 (A) NEG mg/dL    Bilirubin NEGATIVE  NEG      Blood NEGATIVE  NEG      Urobilinogen 1.0 0.2 - 1.0 EU/dL    Nitrites NEGATIVE  NEG      Leukocyte Esterase NEGATIVE  NEG      WBC 0-4 0 - 4 /hpf    RBC 0-5 0 - 5 /hpf    Epithelial cells FEW FEW /lpf    Bacteria NEGATIVE  NEG /hpf   EKG, 12 LEAD, SUBSEQUENT    Collection Time: 08/17/17  1:24 PM   Result Value Ref Range    Ventricular Rate 90 BPM    Atrial Rate 90 BPM    P-R Interval 196 ms    QRS Duration 102 ms    Q-T Interval 402 ms    QTC Calculation (Bezet) 491 ms    Calculated P Axis 80 degrees    Calculated R Axis 11 degrees    Calculated T Axis 112 degrees    Diagnosis       Normal sinus rhythm  Inferior infarct , age undetermined  T wave inversion V2-V5  Abnormal ECG  When compared with ECG of 17-AUG-2017 11:01,  MANUAL COMPARISON REQUIRED, DATA IS UNCONFIRMED  Confirmed by Lalito Parnell MD, --- ([id number]) on 8/17/2017 8:09:25 PM     PROTHROMBIN TIME + INR    Collection Time: 08/17/17  2:39 PM   Result Value Ref Range    INR 1.4 (H) 0.9 - 1.1      Prothrombin time 15.7 (H) 10.0 - 13.0 sec   PTT    Collection Time: 08/17/17  2:39 PM   Result Value Ref Range    aPTT 20.4 (L) 23.0 - 33.5 SEC   EKG, 12 LEAD, INITIAL    Collection Time: 08/17/17  4:49 PM   Result Value Ref Range    Ventricular Rate 87 BPM    Atrial Rate 87 BPM    P-R Interval 192 ms    QRS Duration 108 ms    Q-T Interval 418 ms    QTC Calculation (Bezet) 502 ms    Calculated P Axis 8 degrees    Calculated R Axis -3 degrees    Calculated T Axis 99 degrees    Diagnosis       Normal sinus rhythm with sinus arrhythmia  Inferior infarct , age undetermined  T wave abnormality, consider anterolateral ischemia  Prolonged QT  When compared with ECG of 17-AUG-2017 13:24,  MANUAL COMPARISON REQUIRED, DATA IS UNCONFIRMED     TROPONIN I    Collection Time: 08/17/17  5:30 PM   Result Value Ref Range    Troponin-I, Qt. 0.54 (H) <0.05 ng/mL   CK W/ CKMB & INDEX    Collection Time: 08/17/17  5:30 PM   Result Value Ref Range    CK 23 (L) 39 - 308 U/L    CK - MB 1.8 <3.6 NG/ML    CK-MB Index 7.8 (H) 0 - 2.5

## 2017-08-18 NOTE — PROGRESS NOTES
[id number] - IN REPORT:    Verbal report received from 612 Kareem Bermudez RN(name) on [facility] Enter  being received from 2292(unit) for change in patient condition(INCREASED TROPONIN)      Report consisted of patients Situation, Background, Assessment and   Recommendations(SBAR). Information from the following report(s) SBAR, Kardex, Intake/Output, MAR, Med Rec Status and Cardiac Rhythm BBB was reviewed with the receiving nurse. Opportunity for questions and clarification was provided. Assessment completed upon patients arrival to unit and care assumed. Primary Nurse Avis Banegas RN and Nigel Lopez RN performed a dual skin assessment on this patient Impairment noted- see wound doc flow sheet  Kwan score is 13. Multiple bruising on extremities and hands. Stage two on sacrum. 4 open areas noted. Site cleaned. Foam dressing applied until Venelex received. 1520- Torres NP into round. If patient not going for cath patient may eat Speech theraphy consulted. Lamonte Bumpers NP aware patient in 2265. Will see patient soon,    1945-Bedside shift change report given to [address] [place] [address] (oncoming nurse) by Laly Forde RN (offgoing nurse). Report included the following information SBAR, Kardex, Intake/Output, MAR and Recent Results.

## 2017-08-18 NOTE — PROGRESS NOTES
Initial Nutrition Assessment:    INTERVENTIONS/RECOMMENDATIONS:   · Diet advancement to Consistent carb, cardiac diet when medically feasible   · Glucerna TID once diet advanced    ASSESSMENT:   Chart reviewed, medically noted for CAD, HLD, pressure ulcer, T2DM and PMH provided below. ED MD notes abdominal pain with nausea and vomiting. Per past document wt, pt has lost ~20 lbs (8%) in the past month. Pt was unable to provide any history, he was calling out in pain, so unsure of cause of wt loss. Could be from abdominal pain with nausea and vomiting. Currently NPO but will add glucerna to diet once diet is advanced to help increase protein needs to promote healing of pressure ulcer. Past Medical History:   Diagnosis Date    AAA (abdominal aortic aneurysm) (HCC)     Asthma     CAD (coronary artery disease)     Mitzy MPI 7/14 LVEF 45, chronic inf-lat ischemia; Echo 7/14 LVEF 45, mod LVH, mild LAE, mild AI/MR    Diabetic polyneuropathy (HCC)     DJD (degenerative joint disease)     DM II (diabetes mellitus, type II), controlled ([address]     Dyslipidemia     GERD (gastroesophageal reflux disease)     History of left bundle branch block (LBBB)     HTN (hypertension)     Myocardial infarction ([facility]     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    Pneumonia     Prostate CA ([facility] 2012    s/p XRT, hormonal       Diet Order: NPO  % Eaten:  No data found. Pertinent Medications: [x]Reviewed []Other (insulin, zofran, PPI)  Pertinent Labs: [x]Reviewed []Other (BG >200, HA1C 6.6)  Food Allergies: [x]NKFA  []Other   Last BM:  8/17    Skin:    [] Intact   [] Incision  [x] Breakdown  [] Other:     Wt Readings from Last 30 Encounters:   08/17/17 98.8 kg (217 lb 13 oz)   08/13/17 99.8 kg (220 lb 0.3 oz)   07/19/17 108.9 kg (240 lb)   07/16/17 108.9 kg (240 lb)       Anthropometrics:   Height:   Weight:     IBW (%IBW):   ( ) UBW (%UBW):   (  %)   Last Weight Metrics:  Weight Loss Metrics 8/17/2017 8/13/2017 7/19/2017 7/16/2017   Today's Wt 217 lb 13 oz 220 lb 0.3 oz 240 lb 240 lb   BMI 30.38 kg/m2 30.69 kg/m2 33.47 kg/m2 33.47 kg/m2       BMI: There is no height or weight on file to calculate BMI. This BMI is indicative of:   []Underweight    []Normal    []Overweight    [x] Obesity   [] Extreme Obesity (BMI>40)     Estimated Nutrition Needs (Based on):   2025 Kcals/day (MSJ: 1690 x1.2) , 80 g (0.8 g/kg) Protein  Carbohydrate: At Least 130 g/day  Fluids: 2025 mL/day (1ml/kcal) or per primary team    NUTRITION DIAGNOSES:   Problem:  Inadequate protein-energy intake      Etiology: related to abdominal pain, nause and vomiting     Signs/Symptoms: as evidenced by 8% wt loss x 1 month      NUTRITION INTERVENTIONS:  Meals/Snacks: General/healthful diet   Supplements: Commercial supplement              GOAL:   consume >50% of meals and ONS in 2-4 days    LEARNING NEEDS (Diet, Food/Nutrient-Drug Interaction):    [x] None Identified   [] Identified and Education Provided/Documented   [] Identified and Pt declined/was not appropriate     Cultureal, Adventism, OR Ethnic Dietary Needs:    [x] None Identified   [] Identified and Addressed     [x] Interdisciplinary Care Plan Reviewed/Documented    [x] Discharge Planning:   TBD    MONITORING /EVALUATION:      Food/Nutrient Intake Outcomes:  Total energy intake  Physical Signs/Symptoms Outcomes: Weight/weight change, Electrolyte and renal profile, GI profile, Glucose profile, GI    NUTRITION RISK:    [x] High              [] Moderate           []  Low  []  Minimal/Uncompromised    PT SEEN FOR:    [x]  MD Consult: []Calorie Count      []Diabetic Diet Education        []Diet Education     []Electrolyte Management     [x]General Nutrition Management and Supplements     []Management of Tube Feeding     []TPN Recommendations    [x]  RN Referral:  []MST score >=2     []Enteral/Parenteral Nutrition PTA     []Pregnant: Gestational DM or Multigestation     [x]Pressure Ulcer/Wound Care needs        []  Low BMI  []  DTR Referral       Ramón Jansen, [place]   Pager [phone]  Weekend Pager [phone]

## 2017-08-18 NOTE — PROGRESS NOTES
Pressure Ulcer Prevention Alert Received for Kwan < 14 (moderate risk).        Care Plan/Interventions for Nursin. Complete Kwan Pressure Ulcer Risk Scale and use sub scores to identify appropriate interventions. 2. Perform Assessment: skin, changes in LOC, visual cues for pain, monitor skin under medical devices  3. Respond to Reduced Sensory Perception: changes in LOC, check visual cues for pain, float heels, suspension boots, pressure redistribution bed/mattress/chair cushion, turning and reposition approximately every 2 hours (pillows & wedges), pad between skin to skin, turn & reposition  4. Manage Moisture: absorbent under pads, internal / external urinary device, internal /  external fecal device, minimize layers, contain wound drainage, access need for specialty bed, limit adult briefs, maintain skin hydration (lotion/cream), moisture barrier, offer toileting every hour  5. Promote Activity: increase time out of bed, chair cushion, PT/OT evaluation, trapeze to reposition, pressure redistribution bed/mattress/chair  6. Address Reduced Mobility: float heels / suspension boot, HOB 30 degrees or less, pressure redistribution bed/mattress/cushion, PT / OT evaluation, turn and reposition approximately every 2 hours (pillows & wedges)  7. Promote Nutrition: document food / fluid / supplement intake, encourage/assist with meals as needed  8. Reduce Friction and Shear: transferring/repositioning devices (lift/draw sheet), lift team/ patient mobility team, feet elevated on foot rest, minimize layers, foam dressing / transparent film / skin sealants, protective barrier creams and emollients, transfer aides (board, Ervin lift, ceiling lift, stand assist), HOB 30 degrees or less, trapeze to reposition.   Wound Care Team

## 2017-08-18 NOTE — PROGRESS NOTES
This 80 yr old male was transferred from [facility] du to elevated troponin, chronic impacted L femoral head fracture, ABD pain and vomiting. Prior to hospitalization patient has been in [facility] and rehab for the last month receiving therapy. Patient is not able to provide information this AM and his spouse is leaving to come to the hospital. Have contacted [facility] to obtain any radiology or imaging at the request of CHRISTIAN Lopez.   AAA (abdominal aortic aneurysm) ([facility]      Asthma     CAD (coronary artery disease)      Mitzy MPI 7/14 LVEF 45, chronic inf-lat ischemia; Echo 7/14 LVEF 45, mod LVH, mild LAE, mild AI/MR   Diabetic polyneuropathy ([facility]     DJD (degenerative joint disease)     DM II (diabetes mellitus, type II), controlled ([facility]     Dyslipidemia     GERD (gastroesophageal reflux disease)     History of left bundle branch block (LBBB)     HTN (hypertension)     Myocardial infarction [facility])      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   Pneumonia     Prostate CA ([facility] 2012    s/p XRT, hormonal    Care Management Interventions  Transition of Care Consult (CM Consult): SNF (Has been at [facility] and rehab fora month)  Partner SNF: No  Reason Why Partner SNF Not Chosen: Location  Speech Therapy Consult: Yes  Current Support Network: Nursing Facility  Confirm Follow Up Transport: Family  Plan discussed with Pt/Family/Caregiver:  Yes

## 2017-08-18 NOTE — WOUND CARE
Wound care consult from Dr Neal Turner for POA \"sacral decub'. [address] Mishra CCL nurse requested that I do not disturb patient, who is resting \"comfortably\" on back. Patient apparently \"screamed all night long\" secondary to \"Chronic Impacted Left Femoral Head Fracture: ideally needs total hip replacement to control pain\". Joby Moran, and staff nurse both state they have not assessed patients sacral/buttock wound/s. Wound LDA states there are x2 circular wounds on each buttock that are partial thickness and then there is other documentation of wounds being a purple/maroon base with partial thickness (stage 2) opening (done by ED or admit to floor nurse). Stage 2 versus DTI  Based on that little bit of wound information and being unable to assess wounds will recommend the following. Specialty bed - ENVISION MATTRESS patient on complete bed rest, refuses or screams when repositioned, Kwan=13, unsure of moisture issues because staff nurses do not know. Buttock wounds: daily cleanse with NS, wipe wound beds clean. Apply Venelex ointment and cover with foam dressing to prevent further friction injuries. Once hip pain figured out will change Venelex application to BID.   Dominique Warren RN, CWON, zone ph# [phone]

## 2017-08-18 NOTE — PROGRESS NOTES
Additional DX:  HCAP  - CXR revaled Left upper lobe atelectasis/consolidation. - started on IV Cefepime and Vanc.     dk

## 2017-08-18 NOTE — CARDIO/PULMONARY
C/P rehab note- chart reviewed for in basket referral.    Adm with Abd pain-now resolved /vomiting. Elevated troponin. HX includes CAD with PCI. Stress test neg on 7/20/2017. Cardiology consulted. HX includes  HTN, T2DM,AAA,DJD,Dyslipidemia, NSTEMI. Former smoker. LVEF 15-20%. Per cardiology notes may consider CC. Pt lives in SNF and unable to obtain hx due to confusion. Currently NPO. Attempt to meet with family if available. Pt sleeping currently. No family at bedside. Left CHF folder at bedside for review if family visits. Will continue to follow for teaching.

## 2017-08-18 NOTE — PROGRESS NOTES
Charge Nurse informed about the Mews score. TRANSFER - OUT REPORT:    Verbal report given to Dayanna(name) on Franco Borrego  being transferred to PCU(unit) for routine progression of care       Report consisted of patients Situation, Background, Assessment and   Recommendations(SBAR). Information from the following report(s) SBAR, Kardex, Intake/Output, Recent Results and Cardiac Rhythm NSR/ST was reviewed with the receiving nurse. Lines:   Triple Lumen 08/17/17 Right;Upper Subclavian (Active)   Central Line Being Utilized Yes 8/18/2017  4:00 AM   Criteria for Appropriate Use Limited/no vessel suitable for conventional peripheral access 8/18/2017  4:00 AM   Site Assessment Clean, dry, & intact 8/18/2017  4:00 AM   Infiltration Assessment 0 8/18/2017  4:00 AM   Affected Extremity/Extremities Color distal to insertion site pink (or appropriate for race); Pulses palpable;Range of motion performed 8/18/2017  4:00 AM   Date of Last Dressing Change 08/17/17 8/18/2017  4:00 AM   Dressing Status Clean, dry, & intact 8/18/2017  4:00 AM   Dressing Type Disk with Chlorhexadine gluconate (CHG); Transparent 8/18/2017  4:00 AM   Proximal Hub Color/Line Status Red;Flushed;Patent 8/18/2017  4:00 AM   Positive Blood Return (Medial Site) Yes 8/18/2017  4:00 AM   Medial Hub Color/Line Status White;Flushed;Patent 8/18/2017  4:00 AM   Positive Blood Return (Lateral Site) Yes 8/18/2017  4:00 AM   Distal Hub Color/Line Status Blue;Flushed;Patent 8/18/2017  4:00 AM   Positive Blood Return (Site #3) Yes 8/18/2017  4:00 AM   External Catheter Length (cm) 5 centimeters 8/17/2017  3:01 PM   Alcohol Cap Used Yes 8/18/2017  4:00 AM        Opportunity for questions and clarification was provided.       Patient transported with:   Monitor  Patient-specific medications from Pharmacy

## 2017-08-18 NOTE — PROGRESS NOTES
Pharmacy Automatic Renal Dosing Protocol - Antimicrobials    Indication:  HCAP    Current Regimens:      Vancomycin and Cefepime Consult  Abx Allergies:  Levaquin, Sulfa  Microbiology Results (Current encounter)   Date/Time Order Name Specimen Source Lab Status   17 0839 CULTURE, BLOOD, PAIRED Blood In process     Ht Readings from Last 1 Encounters:   17 180.3 cm (71\")   ,  Wt Readings from Last 1 Encounters:   17 98.8 kg (217 lb 13 oz)        Estimated Creatinine Clearance: 98.3 mL/min (based on Cr of 0.67). Estimated Creatinine Clearance (using IBW): 87.4 mL/min    Recent Labs      17   0209  17   1439  17   1202   CREA  0.67*   --   0.96   BUN  23*   --   31*   WBC  15.9*   --   13.6*   K  3.8   --   4.4   CA  7.9*   --   8.7   ALB  1.9*   --   2.0*   HGB  11.9*   --   13.1   HCT  35.7*   --   39.9   PLT  223   --   183   INR   --   1.4*   --    ALT  28   --   39     Temp (24hrs), Av.1 °F (36.7 °C), Min:97.2 °F (36.2 °C), Max:98.5 °F (36.9 °C)    CAPD, PD, HD or CVVH:  none  Paralysis, amputations:  none  Vancomycin Trough Goal Level:  15-20    Impression/Plan:  Vancomycin 2750mg IV loading dose after 2gm Cefepime. Vancomycin 1500mg (15.2mg/kg) IV q12h for a projected trough at Css of 16.9. Continue Cefepime 2gm IV q8h. Recent discharge from [facility] in July. OP Prescriptions:  LQ , Azithromycin  not confirmed as taken w/ pt due to nonoriented. Pharmacy will follow daily and adjust doses of monitored medications as appropriate.   Thank you,  David Hawkins, [facility]

## 2017-08-18 NOTE — PROGRESS NOTES
Code status discussed with the patient and family members. ANAMNR paper work completed.   We will continue with current medical management while honoring his wishes on not conducting heroic measurement in the event of cardiac/resp arrest. chan

## 2017-08-18 NOTE — CONSULTS
[id number] [facility] [address] [facility]     Date of  Admission: 8/17/2017  4:41 PM     Admission type:Emergency    Consult for: elevated troponin  Consult by: Hospitalist     Subjective:     Sudhir Braswell is a 80 y.o. male admitted for Abdominal pain, Elevated troponin. Tx from [facility] for further evaluation of elevated troponin 0.32-0.54-0.55. Hx ASCVD, CAD, s/p remote inferior wall MI (25 yrs ago), prior PCI/stent x 2 (last 10 yrs ago), DM, hypertension, dyslipidemia, stable AAA (4.4). Recent admission to [facility] with chest pain while taking Lexiscan which was negative for ischemia. At 7/17/17 admission, troponin positive max 0.68. Echo EF 50-55%. Has had severe back/L hip pain awaiting LTHR. The patient lives in a SNF, is confused, and unable to give any history. Admission to [facility] yesterday with c/o n/vomiting, pain in L hip. This morning patient c/o pain, pointed to midsternal area, nitro SL given with relief. ECGs with SR, and inf STW changes which are new in comparison to previous ECGs. Previous treatment/evaluation includes Percutaneous Coronary Intervention, echocardiogram and stress thallium . Cardiac risk factors: family history, dyslipidemia, diabetes mellitus, obesity, sedentary life style, male gender, hypertension.       Patient Active Problem List    Diagnosis Date Noted    Abdominal pain 08/17/2017    Elevated troponin 08/17/2017    NSTEMI, initial episode of care [facility]) 07/22/2017    Coronary artery disease involving native coronary artery of native heart without angina pectoris 07/14/2017    Essential hypertension 07/14/2017    Dyslipidemia 07/14/2017    Controlled type 2 diabetes mellitus without complication, without long-term current use of insulin ([facility] 07/14/2017    Abdominal aortic aneurysm (AAA) without rupture ([facility] 07/14/2017    Diabetic polyneuropathy associated with type 2 diabetes mellitus ([facility] 07/14/2017    Degenerative joint disease (DJD) of hip 07/14/2017    Leg pain 07/14/2017 June B MD Wil  Past Medical History:   Diagnosis Date    AAA (abdominal aortic aneurysm) ([facility]     Asthma     CAD (coronary artery disease)     Mitzy MPI 7/14 LVEF 45, chronic inf-lat ischemia; Echo 7/14 LVEF 45, mod LVH, mild LAE, mild AI/MR    Diabetic polyneuropathy (HCC)     DJD (degenerative joint disease)     DM II (diabetes mellitus, type II), controlled ([facility]     Dyslipidemia     GERD (gastroesophageal reflux disease)     History of left bundle branch block (LBBB)     HTN (hypertension)     Myocardial infarction ([facility]     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    Pneumonia     Prostate CA ([facility] 2012    s/p XRT, hormonal      Social History     Social History    Marital status:      Spouse name: N/A    Number of children: N/A    Years of education: N/A     Social History Main Topics    Smoking status: Former Smoker     Types: Cigarettes     Quit date: 7/14/1997    Smokeless tobacco: Never Used    Alcohol use No    Drug use: Not on file    Sexual activity: Not on file     Other Topics Concern    Not on file     Social History Narrative     Allergies   Allergen Reactions    Contrast Agent [Iodine] Hives    Levaquin [Levofloxacin] Unknown (comments)    Sulfa (Sulfonamide Antibiotics) Unknown (comments)      Family History   Problem Relation Age of Onset    Tuberculosis Mother     Asthma Father     Coronary Artery Disease Sister     Asthma Sister       Current Facility-Administered Medications   Medication Dose Route Frequency    HYDROmorphone (PF) (DILAUDID) injection 0.5 mg  0.5 mg IntraVENous Q4H PRN    nitroglycerin (NITROSTAT) tablet 0.4 mg  0.4 mg SubLINGual PRN    cefepime (MAXIPIME) 2 g in 0.9% sodium chloride (MBP/ADV) 100 mL  2 g IntraVENous Q8H    vancomycin (VANCOCIN) 2,750 mg in 0.9% sodium chloride 500 mL IVPB  2,750 mg IntraVENous ONCE    [START ON 8/19/2017] vancomycin (VANCOCIN) 1500 mg in  ml infusion  1,500 mg IntraVENous Q12H    balsam peru-castor oil (VENELEX)  mg/gram ointment   Topical DAILY    aspirin delayed-release tablet 81 mg  81 mg Oral DAILY    atorvastatin (LIPITOR) tablet 10 mg  10 mg Oral DAILY    carvedilol (COREG) tablet 3.125 mg  3.125 mg Oral BID WITH MEALS    diazePAM (VALIUM) tablet 5 mg  5 mg Oral Q12H PRN    DULoxetine (CYMBALTA) capsule 30 mg  30 mg Oral DAILY    fluticasone-vilanterol (BREO ELLIPTA) 100mcg-25mcg/puff  1 Puff Inhalation DAILY    gabapentin (NEURONTIN) capsule 600 mg  600 mg Oral QID    isosorbide mononitrate ER (IMDUR) tablet 30 mg  30 mg Oral DAILY    levothyroxine (SYNTHROID) tablet 50 mcg  50 mcg Oral ACB    montelukast (SINGULAIR) tablet 10 mg  10 mg Oral DAILY    nitroglycerin (NITROSTAT) tablet 0.4 mg  0.4 mg SubLINGual PRN    tamsulosin (FLOMAX) capsule 0.8 mg  0.8 mg Oral DAILY    pantoprazole (PROTONIX) 40 mg in sodium chloride 0.9 % 10 mL injection  40 mg IntraVENous DAILY    ondansetron (ZOFRAN) injection 4 mg  4 mg IntraVENous Q4H PRN    sodium chloride (NS) flush 5-10 mL  5-10 mL IntraVENous Q8H    sodium chloride (NS) flush 5-10 mL  5-10 mL IntraVENous PRN    acetaminophen (TYLENOL) tablet 1,000 mg  1,000 mg Oral TID    fentaNYL (DURAGESIC) 12 mcg/hr patch 1 Patch  1 Patch TransDERmal Q72H    oxyCODONE IR (ROXICODONE) tablet 10 mg  10 mg Oral Q4H PRN    naloxone (NARCAN) injection 0.4 mg  0.4 mg IntraVENous PRN    enoxaparin (LOVENOX) injection 40 mg  40 mg SubCUTAneous DAILY    insulin lispro (HUMALOG) injection   SubCUTAneous AC&HS    glucose chewable tablet 16 g  4 Tab Oral PRN    glucagon (GLUCAGEN) injection 1 mg  1 mg IntraMUSCular PRN    dextrose 10% infusion 125-250 mL  125-250 mL IntraVENous PRN    senna-docusate (PERICOLACE) 8.6-50 mg per tablet 1 Tab  1 Tab Oral BID    polyethylene glycol (MIRALAX) packet 17 g  17 g Oral DAILY        Review of Symptoms: unable to obtain due to confusion  Constitutional: negative  Eyes: negative   Ears, nose, mouth, throat, and face: negative  Respiratory: negative   Cardiovascular: negative   Gastrointestinal: negative  Genitourinary:negative   Musculoskeletal:negative   Neurological: negative   Endocrine: negative          Objective:      Visit Vitals    BP (!) (P) 130/92    Pulse (!) (P) 105    Temp 97.2 °F (36.2 °C)    Resp 26    SpO2 93%       Physical:   General: elderly  male in no acute distress  Heart: RRR, no m/S3/JVD, no carotid bruits   Lungs: slight rhonchi  Abdomen: Soft, +BS, NTND   Extremities: LE luis m +DP/PT, no edema   Neurologic: Grossly normal    Data Review:   Recent Labs      08/18/17   0209  08/17/17   1202   WBC  15.9*  13.6*   HGB  11.9*  13.1   HCT  35.7*  39.9   PLT  223  183     Recent Labs      08/18/17   0209  08/17/17   1439  08/17/17   1202   NA  134*   --   137   K  3.8   --   4.4   CL  100   --   101   CO2  27   --   27   GLU  131*   --   123*   BUN  23*   --   31*   CREA  0.67*   --   0.96   CA  7.9*   --   8.7   ALB  1.9*   --   2.0*   TBILI  0.6   --   0.6   SGOT  54*   --   104*   ALT  28   --   39   INR   --   1.4*   --        Recent Labs      08/18/17   0209  08/17/17   1730  08/17/17   1202   TROIQ  0.55*  0.54*  0.32*   CPK   --   23*   --    CKMB   --   1.8   --          Intake/Output Summary (Last 24 hours) at 08/18/17 1026  Last data filed at 08/18/17 0400   Gross per 24 hour   Intake              170 ml   Output              425 ml   Net             -255 ml        Cardiographics    Telemetry:   ECG:     Echocardiogram: 7/2017 EF 55-60%, no regional wall motion abnormalities. RV mod dil, LAE     CXRAY:Left upper lobe atelectasis/consolidation.       Assessment:       Active Problems:    Coronary artery disease involving native coronary artery of native heart without angina pectoris (7/14/2017)      Essential hypertension (7/14/2017)      Abdominal pain (8/17/2017)      Elevated troponin (8/17/2017)         Plan:     Elevated troponin:  ACS vs infection (PNA), elevation mild questionable significance, CPK low  Recent negative lexiscan nuclear study, Echo with normal EF and no wall motion abnormalities  Continue on ASA, BB, Imdur, statin, Lovenox (increase to full dose for possible ACS)  If patient continues to have chest discomfort, given ECG changes, elevated troponin, would consider cardiac cath if family willing to proceed  Keep NPO for now except for meds if able to swallow (Speech eval pending)  Patient is unable to consent in his confused state. Thank you for consulting [facility]    Jean Claude Matthew NP       Pt seen and examined in details. Agree with NP A&P. Difficult to get any meaningful history from pt. Will d/w family.      Ralph Lovell MD

## 2017-08-19 ENCOUNTER — APPOINTMENT (OUTPATIENT)
Dept: GENERAL RADIOLOGY | Age: 82
DRG: 177 | End: 2017-08-19
Attending: PHYSICIAN ASSISTANT
Payer: MEDICARE

## 2017-08-19 PROBLEM — J18.9 CAP (COMMUNITY ACQUIRED PNEUMONIA): Status: ACTIVE | Noted: 2017-08-19

## 2017-08-19 LAB
ANION GAP SERPL CALC-SCNC: 8 MMOL/L (ref 5–15)
BUN SERPL-MCNC: 30 MG/DL (ref 6–20)
BUN/CREAT SERPL: 47 (ref 12–20)
CALCIUM SERPL-MCNC: 7.3 MG/DL (ref 8.5–10.1)
CHLORIDE SERPL-SCNC: 104 MMOL/L (ref 97–108)
CO2 SERPL-SCNC: 26 MMOL/L (ref 21–32)
CREAT SERPL-MCNC: 0.64 MG/DL (ref 0.7–1.3)
ERYTHROCYTE [DISTWIDTH] IN BLOOD BY AUTOMATED COUNT: 14.2 % (ref 11.5–14.5)
GLUCOSE BLD STRIP.AUTO-MCNC: 132 MG/DL (ref 65–100)
GLUCOSE BLD STRIP.AUTO-MCNC: 141 MG/DL (ref 65–100)
GLUCOSE BLD STRIP.AUTO-MCNC: 166 MG/DL (ref 65–100)
GLUCOSE BLD STRIP.AUTO-MCNC: 198 MG/DL (ref 65–100)
GLUCOSE SERPL-MCNC: 174 MG/DL (ref 65–100)
HCT VFR BLD AUTO: 29.8 % (ref 36.6–50.3)
HGB BLD-MCNC: 9.9 G/DL (ref 12.1–17)
MCH RBC QN AUTO: 31.7 PG (ref 26–34)
MCHC RBC AUTO-ENTMCNC: 33.2 G/DL (ref 30–36.5)
MCV RBC AUTO: 95.5 FL (ref 80–99)
PLATELET # BLD AUTO: 214 K/UL (ref 150–400)
POTASSIUM SERPL-SCNC: 3.2 MMOL/L (ref 3.5–5.1)
RBC # BLD AUTO: 3.12 M/UL (ref 4.1–5.7)
SERVICE CMNT-IMP: ABNORMAL
SODIUM SERPL-SCNC: 138 MMOL/L (ref 136–145)
WBC # BLD AUTO: 13.1 K/UL (ref 4.1–11.1)

## 2017-08-19 PROCEDURE — 36591 DRAW BLOOD OFF VENOUS DEVICE: CPT

## 2017-08-19 PROCEDURE — 73502 X-RAY EXAM HIP UNI 2-3 VIEWS: CPT

## 2017-08-19 PROCEDURE — 74011250637 HC RX REV CODE- 250/637: Performed by: NURSE PRACTITIONER

## 2017-08-19 PROCEDURE — 87186 SC STD MICRODIL/AGAR DIL: CPT | Performed by: INTERNAL MEDICINE

## 2017-08-19 PROCEDURE — 74011250636 HC RX REV CODE- 250/636: Performed by: INTERNAL MEDICINE

## 2017-08-19 PROCEDURE — 74011250636 HC RX REV CODE- 250/636: Performed by: NURSE PRACTITIONER

## 2017-08-19 PROCEDURE — 82962 GLUCOSE BLOOD TEST: CPT

## 2017-08-19 PROCEDURE — 74011000258 HC RX REV CODE- 258: Performed by: NURSE PRACTITIONER

## 2017-08-19 PROCEDURE — 87185 SC STD ENZYME DETCJ PER NZM: CPT | Performed by: INTERNAL MEDICINE

## 2017-08-19 PROCEDURE — 74011250636 HC RX REV CODE- 250/636: Performed by: EMERGENCY MEDICINE

## 2017-08-19 PROCEDURE — C9113 INJ PANTOPRAZOLE SODIUM, VIA: HCPCS | Performed by: INTERNAL MEDICINE

## 2017-08-19 PROCEDURE — 74011250637 HC RX REV CODE- 250/637: Performed by: INTERNAL MEDICINE

## 2017-08-19 PROCEDURE — 80048 BASIC METABOLIC PNL TOTAL CA: CPT | Performed by: NURSE PRACTITIONER

## 2017-08-19 PROCEDURE — 74011636637 HC RX REV CODE- 636/637: Performed by: INTERNAL MEDICINE

## 2017-08-19 PROCEDURE — 65660000000 HC RM CCU STEPDOWN

## 2017-08-19 PROCEDURE — 85027 COMPLETE CBC AUTOMATED: CPT | Performed by: NURSE PRACTITIONER

## 2017-08-19 PROCEDURE — 87077 CULTURE AEROBIC IDENTIFY: CPT | Performed by: INTERNAL MEDICINE

## 2017-08-19 PROCEDURE — 74011000250 HC RX REV CODE- 250: Performed by: INTERNAL MEDICINE

## 2017-08-19 PROCEDURE — 80202 ASSAY OF VANCOMYCIN: CPT | Performed by: INTERNAL MEDICINE

## 2017-08-19 PROCEDURE — 36415 COLL VENOUS BLD VENIPUNCTURE: CPT | Performed by: NURSE PRACTITIONER

## 2017-08-19 PROCEDURE — 87205 SMEAR GRAM STAIN: CPT | Performed by: INTERNAL MEDICINE

## 2017-08-19 RX ORDER — FENTANYL 25 UG/1
1 PATCH TRANSDERMAL
Status: DISCONTINUED | OUTPATIENT
Start: 2017-08-19 | End: 2017-08-22

## 2017-08-19 RX ORDER — LISINOPRIL 5 MG/1
2.5 TABLET ORAL DAILY
Status: DISCONTINUED | OUTPATIENT
Start: 2017-08-20 | End: 2017-08-22

## 2017-08-19 RX ORDER — HYDROMORPHONE HYDROCHLORIDE 1 MG/ML
0.5 INJECTION, SOLUTION INTRAMUSCULAR; INTRAVENOUS; SUBCUTANEOUS
Status: DISCONTINUED | OUTPATIENT
Start: 2017-08-19 | End: 2017-08-23

## 2017-08-19 RX ORDER — DIAZEPAM 5 MG/1
5 TABLET ORAL
Status: DISCONTINUED | OUTPATIENT
Start: 2017-08-19 | End: 2017-08-24

## 2017-08-19 RX ORDER — HYDROMORPHONE HYDROCHLORIDE 1 MG/ML
1 INJECTION, SOLUTION INTRAMUSCULAR; INTRAVENOUS; SUBCUTANEOUS
Status: DISCONTINUED | OUTPATIENT
Start: 2017-08-19 | End: 2017-08-19

## 2017-08-19 RX ORDER — POTASSIUM CHLORIDE 7.45 MG/ML
10 INJECTION INTRAVENOUS
Status: COMPLETED | OUTPATIENT
Start: 2017-08-19 | End: 2017-08-22

## 2017-08-19 RX ADMIN — INSULIN LISPRO 2 UNITS: 100 INJECTION, SOLUTION INTRAVENOUS; SUBCUTANEOUS at 18:20

## 2017-08-19 RX ADMIN — ASPIRIN 81 MG: 81 TABLET, COATED ORAL at 09:00

## 2017-08-19 RX ADMIN — CEFEPIME HYDROCHLORIDE 2 G: 2 INJECTION, POWDER, FOR SOLUTION INTRAVENOUS at 02:18

## 2017-08-19 RX ADMIN — VANCOMYCIN HYDROCHLORIDE 1500 MG: 10 INJECTION, POWDER, LYOPHILIZED, FOR SOLUTION INTRAVENOUS at 15:51

## 2017-08-19 RX ADMIN — TAMSULOSIN HYDROCHLORIDE 0.8 MG: 0.4 CAPSULE ORAL at 09:00

## 2017-08-19 RX ADMIN — FLUTICASONE FUROATE AND VILANTEROL TRIFENATATE 1 PUFF: 100; 25 POWDER RESPIRATORY (INHALATION) at 11:31

## 2017-08-19 RX ADMIN — POTASSIUM CHLORIDE 10 MEQ: 10 INJECTION, SOLUTION INTRAVENOUS at 18:22

## 2017-08-19 RX ADMIN — POTASSIUM CHLORIDE 10 MEQ: 10 INJECTION, SOLUTION INTRAVENOUS at 11:19

## 2017-08-19 RX ADMIN — ATORVASTATIN CALCIUM 10 MG: 10 TABLET, FILM COATED ORAL at 09:00

## 2017-08-19 RX ADMIN — ACETAMINOPHEN 500 MG: 500 TABLET, FILM COATED ORAL at 22:09

## 2017-08-19 RX ADMIN — CEFEPIME HYDROCHLORIDE 2 G: 2 INJECTION, POWDER, FOR SOLUTION INTRAVENOUS at 18:19

## 2017-08-19 RX ADMIN — GABAPENTIN 600 MG: 300 CAPSULE ORAL at 12:49

## 2017-08-19 RX ADMIN — HYDROMORPHONE HYDROCHLORIDE 0.5 MG: 1 INJECTION, SOLUTION INTRAMUSCULAR; INTRAVENOUS; SUBCUTANEOUS at 20:04

## 2017-08-19 RX ADMIN — ENOXAPARIN SODIUM 40 MG: 40 INJECTION SUBCUTANEOUS at 11:18

## 2017-08-19 RX ADMIN — POTASSIUM CHLORIDE 10 MEQ: 10 INJECTION, SOLUTION INTRAVENOUS at 19:18

## 2017-08-19 RX ADMIN — CEFEPIME HYDROCHLORIDE 2 G: 2 INJECTION, POWDER, FOR SOLUTION INTRAVENOUS at 11:18

## 2017-08-19 RX ADMIN — LIDOCAINE HYDROCHLORIDE 5 ML: 20 SOLUTION ORAL; TOPICAL at 11:31

## 2017-08-19 RX ADMIN — CASTOR OIL AND BALSAM, PERU: 788; 87 OINTMENT TOPICAL at 11:31

## 2017-08-19 RX ADMIN — DIAZEPAM 5 MG: 5 TABLET ORAL at 12:47

## 2017-08-19 RX ADMIN — Medication 10 ML: at 02:19

## 2017-08-19 RX ADMIN — SODIUM CHLORIDE 40 MG: 9 INJECTION INTRAMUSCULAR; INTRAVENOUS; SUBCUTANEOUS at 11:18

## 2017-08-19 RX ADMIN — ISOSORBIDE MONONITRATE 30 MG: 30 TABLET, EXTENDED RELEASE ORAL at 09:00

## 2017-08-19 RX ADMIN — DULOXETINE HYDROCHLORIDE 30 MG: 30 CAPSULE, DELAYED RELEASE ORAL at 09:00

## 2017-08-19 RX ADMIN — INSULIN LISPRO 2 UNITS: 100 INJECTION, SOLUTION INTRAVENOUS; SUBCUTANEOUS at 13:26

## 2017-08-19 RX ADMIN — LIDOCAINE HYDROCHLORIDE 5 ML: 20 SOLUTION ORAL; TOPICAL at 18:21

## 2017-08-19 RX ADMIN — HYDROMORPHONE HYDROCHLORIDE 0.5 MG: 1 INJECTION, SOLUTION INTRAMUSCULAR; INTRAVENOUS; SUBCUTANEOUS at 08:37

## 2017-08-19 RX ADMIN — Medication 10 ML: at 22:14

## 2017-08-19 RX ADMIN — MONTELUKAST SODIUM 10 MG: 10 TABLET, FILM COATED ORAL at 09:00

## 2017-08-19 RX ADMIN — ACETAMINOPHEN 1000 MG: 500 TABLET, FILM COATED ORAL at 09:00

## 2017-08-19 RX ADMIN — HYDROMORPHONE HYDROCHLORIDE 0.5 MG: 1 INJECTION, SOLUTION INTRAMUSCULAR; INTRAVENOUS; SUBCUTANEOUS at 04:51

## 2017-08-19 RX ADMIN — DIAZEPAM 5 MG: 5 TABLET ORAL at 22:09

## 2017-08-19 RX ADMIN — POTASSIUM CHLORIDE 10 MEQ: 10 INJECTION, SOLUTION INTRAVENOUS at 12:55

## 2017-08-19 NOTE — PROGRESS NOTES
1915: Bedside report received from [facility], Fadi FREEMAN. Patient cleaned up and repositioned in bed. Patient moaning and appears to be in pain. Patient medicated with dilaudid as ordered. 2200: Patient unable to swallow pills well even when crushed and given in applesauce. Patient stated he is not in pain but cannot tell me why he is hollering out and moaning. Patient medicated with Valium due to him moaning and hollering out. Patient is also 88-90% on room air so patient placed on 2 liters nasal cannula. 2215: Patient quieter and appears to be slightly more comfortable after the valium.

## 2017-08-19 NOTE — PROGRESS NOTES
8/19/2017 1:54 PM    Admit Date: 8/17/2017    Admit Diagnosis:   Abdominal pain;Elevated troponin    Subjective:     [facility] denies chest pain or shortness of breath. Complains of hip pain.     Current Facility-Administered Medications   Medication Dose Route Frequency    fentaNYL (DURAGESIC) 25 mcg/hr patch 1 Patch  1 Patch TransDERmal Q72H    diazePAM (VALIUM) tablet 5 mg  5 mg Oral Q8H PRN    VANCOMYCIN TROUGH  1 Each Other ONCE    HYDROmorphone (PF) (DILAUDID) injection 0.5 mg  0.5 mg IntraVENous Q4H PRN    cefepime (MAXIPIME) 2 g in 0.9% sodium chloride (MBP/ADV) 100 mL  2 g IntraVENous Q8H    vancomycin (VANCOCIN) 1500 mg in  ml infusion  1,500 mg IntraVENous Q12H    balsam peru-castor oil (VENELEX)  mg/gram ointment   Topical DAILY    magic mouthwash cpd (with sucralfate)  5 mL Oral TIDAC    aspirin delayed-release tablet 81 mg  81 mg Oral DAILY    atorvastatin (LIPITOR) tablet 10 mg  10 mg Oral DAILY    carvedilol (COREG) tablet 3.125 mg  3.125 mg Oral BID WITH MEALS    DULoxetine (CYMBALTA) capsule 30 mg  30 mg Oral DAILY    fluticasone-vilanterol (BREO ELLIPTA) 100mcg-25mcg/puff  1 Puff Inhalation DAILY    gabapentin (NEURONTIN) capsule 600 mg  600 mg Oral QID    isosorbide mononitrate ER (IMDUR) tablet 30 mg  30 mg Oral DAILY    levothyroxine (SYNTHROID) tablet 50 mcg  50 mcg Oral ACB    montelukast (SINGULAIR) tablet 10 mg  10 mg Oral DAILY    nitroglycerin (NITROSTAT) tablet 0.4 mg  0.4 mg SubLINGual PRN    tamsulosin (FLOMAX) capsule 0.8 mg  0.8 mg Oral DAILY    pantoprazole (PROTONIX) 40 mg in sodium chloride 0.9 % 10 mL injection  40 mg IntraVENous DAILY    ondansetron (ZOFRAN) injection 4 mg  4 mg IntraVENous Q4H PRN    sodium chloride (NS) flush 5-10 mL  5-10 mL IntraVENous Q8H    sodium chloride (NS) flush 5-10 mL  5-10 mL IntraVENous PRN    acetaminophen (TYLENOL) tablet 1,000 mg  1,000 mg Oral TID    oxyCODONE IR (ROXICODONE) tablet 10 mg  10 mg Oral Q4H PRN    naloxone (NARCAN) injection 0.4 mg  0.4 mg IntraVENous PRN    enoxaparin (LOVENOX) injection 40 mg  40 mg SubCUTAneous DAILY    insulin lispro (HUMALOG) injection   SubCUTAneous AC&HS    glucose chewable tablet 16 g  4 Tab Oral PRN    glucagon (GLUCAGEN) injection 1 mg  1 mg IntraMUSCular PRN    dextrose 10% infusion 125-250 mL  125-250 mL IntraVENous PRN    senna-docusate (PERICOLACE) 8.6-50 mg per tablet 1 Tab  1 Tab Oral BID    polyethylene glycol (MIRALAX) packet 17 g  17 g Oral DAILY         Objective:      Physical Exam:    Visit Vitals    /85    Pulse 93    Temp 97.5 °F (36.4 °C)    Resp 16    SpO2 97%     Gen:  NAD  Mental Status - Sleepy. General Appearance - Not in acute distress. Chest and Lung Exam   Inspection: Accessory muscles - No use of accessory muscles in breathing. Auscultation:   Breath sounds: - Normal.   Cardiovascular   Inspection: Jugular vein - Bilateral - Inspection Normal.   Palpation/Percussion:   Apical Impulse: - Normal.   Auscultation: Rhythm - Regular. Heart Sounds - S1 WNL and S2 WNL. No S3 or S4. Murmurs & Other Heart Sounds: Auscultation of the heart reveals - No Murmurs. Peripheral Vascular   Upper Extremity: Inspection - Bilateral - No Cyanotic nailbeds or Digital clubbing. Lower Extremity:   Palpation: Edema - Bilateral - No edema. Abdomen:   Soft, non-tender, bowel sounds are active.   Neuro: A&O times 3, CN and motor grossly WNL    Data Review:   Recent Labs      08/19/17   0230  08/18/17   0209  08/17/17   1202   WBC  13.1*  15.9*  13.6*   HGB  9.9*  11.9*  13.1   HCT  29.8*  35.7*  39.9   PLT  214  223  183     Recent Labs      08/19/17   0230  08/18/17   0209  08/17/17   1439  08/17/17   1202   NA  138  134*   --   137   K  3.2*  3.8   --   4.4   CL  104  100   --   101   CO2  26  27   --   27   GLU  174*  131*   --   123*   BUN  30*  23*   --   31*   CREA  0.64*  0.67*   --   0.96   CA  7.3*  7.9*   --   8.7   ALB   --   1.9* --   2.0*   TBILI   --   0.6   --   0.6   SGOT   --   54*   --   104*   ALT   --   28   --   39   INR   --    --   1.4*   --        Recent Labs      08/18/17   0209  08/17/17   1730  08/17/17   1202   TROIQ  0.55*  0.54*  0.32*   CPK   --   23*   --    CKMB   --   1.8   --          Intake/Output Summary (Last 24 hours) at 08/19/17 1354  Last data filed at 08/19/17 0250   Gross per 24 hour   Intake              800 ml   Output              250 ml   Net              550 ml        Telemetry: normal sinus rhythm    Assessment:     Active Problems:    Coronary artery disease involving native coronary artery of native heart without angina pectoris (7/14/2017)      Essential hypertension (7/14/2017)      Abdominal pain (8/17/2017)      Elevated troponin (8/17/2017)      CAP (community acquired pneumonia) (8/19/2017)        Plan:     Elevated troponin:  ACS vs infection (PNA), elevation mild questionable significance, CPK low  7/2017 negative lexiscan nuclear study, Echo with normal EF and no wall motion abnormalities  LVEF now newly decreased with what sounds like hypokinesis of all but the basal walls- ? New CAD versus Takotsubo CM? Continue on ASA, BB, Imdur, statin, Lovenox (increase to full dose for possible ACS)  Add low dose ACEI  Noted DNR discussion yesterday, family not available today  Consider cardiac cath this coming week if fits with goals of care and family willing to proceed after treatment of PNA  It sounds like they may be moving toward conservative care, however  Patient is unable to consent in his confused state. Discussed with NP Cristina Leonard. She points out that the patient and family expressed a desire for conservative medical management only, no invasive cardiac procedures. Please watch for signs of any volume overload with newly reduced ejection fraction. Dr. Isadora Nguyen will check back on the patient when he returns on Tuesday. Please call if we can assist in the meantime.

## 2017-08-19 NOTE — PROGRESS NOTES
Pharmacy Automatic Renal Dosing Protocol - Antimicrobials    Indication for Antimicrobials: HAP    Current Regimen of Each Antimicrobial (Start Day & Day of Therapy):  Cefepime 2 gm IV every 8 hours (Started 17; Day #2)  Vancomycin 1500 mg IV every 12 hours (Started 17; Day #2)    Goal Vancomycin Trough: 15-20 mcg/mL    Significant Cultures:   17 Blood culture = No growth x 23 hours (Results pending)    CAPD, Hemodialysis or Renal Replacement Therapy: N/A     Paralysis, amputations, malnutrition: N/A    Recent Labs      17   0230  17   0209  17   1202   CREA  0.64*  0.67*  0.96   BUN  30*  23*  31*   WBC  13.1*  15.9*  13.6*     Temp (24hrs), Av.9 ° F (36.6 ° C), Min:97.5 ° F (36.4 ° C), Max:98.4 ° F (36.9 ° C)    Creatinine Clearance:  ~84 mL/min    Impression/Plan:   - Cefepime dosed appropriately based on indication and renal function. Continue current regimen. - Vancomycin dosed appropriately based on indication, weight, and renal function. Continue current regimen. Will order a vancomycin trough prior to the dose due at 0000 on 17. Pharmacy will follow daily and adjust medications as appropriate for renal function and/or serum levels.     Thank you,  Reg Nguyen, PHARMD

## 2017-08-19 NOTE — PROGRESS NOTES
Hospitalist Progress Note    NAME: Vida Le   :  1932   MRN:  [id number]       Interim Hospital Summary: 80 y.o. male whom presented on 2017 with      Assessment / Plan:  HCAP  - CXR revaled Left upper lobe atelectasis/consolidation. - continue with IV Cefepime and Vanc. Pt developed severe allergic reaction after taking levofloxacin which resulted severe canker sores. Canker sores  - magic mouth wash; asked nursing staff to help with oral care using sponges. Pt is unable to swish and spit    Chest Pain  Elevated Troponin  History of CAD s/p remote PCI  Stress Test negative on 17  Hyperlipidemia  - discussed with the family and pt yesterday in regards of code status and goals of care. Patient and the family requested continue with current care, do not wish to have any invasive procedures. Pt and the family asked for optimal pain control. Will consult palliative team to follow. - EKG with TWI in V1-V5  - troponin went up to 0.55 from 0.32  - cardiology following; communicated with cardiology team on pt's wish. Conservative medical management. Cardiology team will follow as need basis  - Echo: Ejection fraction was estimated in the range of 15 % to 20 %  - continue ASA, BB& IMDUR  - continue statin     Abdominal Pain: now resolved  Nausea/vomiting  - CT Abdomen/Pelvis: Distended gallbladder. Infrarenal abdominal aortic aneurysm measures 5.3 x 5 cm (no changes from previous images), Colonic diverticulosis. - Abdominal ultrasound:Sludge layers dependently in the nondistended gallbladder. No wall thickening or pericholecystic fluid.     - Avoid NSAID  - IV PPI & IV antiemetics  - stop diclofenac      Chronic Impacted Left Femoral Head Fracture: ideally needs total hip replacement to control pain  - start low dose fentanyl patch  - scheduled Acetaminophen  - prn Oxycodone or dilaudid, valium as need for severe muscle spasm  - Dr. Shahida Andrew spoke with Dr. Jeovanny Ferreira in Radiology to get addendum added on to patient CT A/P which was preformed at [facility] detailing patient's left hip pathology  - ortho following; pain management, conservative management      Sacral Decub:  - consult wound care      Type 2 diabetes mellitus without complication, without long-term current use of insulin   Diabetic polyneuropathy  - hold home oral antihyperglycemics and place on SSI  - Hgb A1c 6.6  - continue home Gabapentin      Abdominal aortic aneurysm (AAA)   - stable on today's CT:Infrarenal abdominal aortic aneurysm measures 5.3 x 5 cm      Obesity  Severe Protein Calorie Malnutrition  -consult Nutrition      Code Status: DNR      DVT Prophylaxis: Lovenox  GI Prophylaxis: PPI as above      Baseline: Bed bound for last 3 months per patient report     Recommended Disposition: tbd    May transfer this patient to telemetry unit from step down     Subjective:     Chief Complaint / Reason for Physician Visit  \"I  Just don't want to be in pain, my mouth feels better\". Discussed with RN events overnight. Review of Systems:  Symptom Y/N Comments  Symptom Y/N Comments   Fever/Chills n   Chest Pain n    Poor Appetite y   Edema     Cough n   Abdominal Pain     Sputum n   Joint Pain y    SOB/LEWIS n   Pruritis/Rash     Nausea/vomit n   Tolerating PT/OT     Diarrhea    Tolerating Diet     Constipation    Other       Could NOT obtain due to:      Objective:     VITALS:   Last 24hrs VS reviewed since prior progress note.  Most recent are:  Patient Vitals for the past 24 hrs:   Temp Pulse Resp BP SpO2   08/19/17 0730 97.6 °F (36.4 °C) 99 20 (!) 141/98 95 %   08/19/17 0231 98.2 °F (36.8 °C) (!) 102 20 143/89 96 %   08/18/17 2340 97.7 °F (36.5 °C) (!) 106 20 125/78 96 %   08/18/17 2217 - - - - 94 %   08/18/17 2215 - - - - (!) 88 %   08/18/17 1917 98.4 °F (36.9 °C) (!) 111 22 139/89 92 %   08/18/17 1408 - - - - 93 %   08/18/17 1354 97.7 °F (36.5 °C) (!) 108 24 144/89 93 %   08/18/17 1124 97.4 °F (36.3 °C) (!) 116 22 121/78 94 % Intake/Output Summary (Last 24 hours) at 08/19/17 1120  Last data filed at 08/19/17 0250   Gross per 24 hour   Intake              800 ml   Output              250 ml   Net              550 ml        PHYSICAL EXAM:  General: Ill appearing, pale, Alert, cooperative most of time, no acute distress    EENT:  EOMI. Anicteric sclerae. Multiple canker sores due to antibiotic allergic reaction  Resp:  CTA bilaterally, no wheezing or rales. No accessory muscle use  CV:  Regular  rhythm,  No edema  GI:  Soft, Non distended, Non tender.  +Bowel sounds  Neurologic:  Alert and oriented X self, place, current medical condition, normal speech,   Psych:   Fair insight. Not anxious nor agitated  Skin:  No rashes. No jaundice    Reviewed most current lab test results and cultures  YES  Reviewed most current radiology test results   YES  Review and summation of old records today    NO  Reviewed patient's current orders and MAR    YES  PMH/ reviewed - no change compared to H&P  ________________________________________________________________________  Care Plan discussed with:    Comments   Patient y    Family      RN y    Care Manager     Consultant                        Multidiciplinary team rounds were held today with , nursing, pharmacist and clinical coordinator. Patient's plan of care was discussed; medications were reviewed and discharge planning was addressed. ________________________________________________________________________  Total NON critical care TIME:  30  Minutes    Total CRITICAL CARE TIME Spent:   Minutes non procedure based      Comments   >50% of visit spent in counseling and coordination of care     ________________________________________________________________________  Beryl Jerez NP     Procedures: see electronic medical records for all procedures/Xrays and details which were not copied into this note but were reviewed prior to creation of Plan.       LABS:  I reviewed today's most current labs and imaging studies.   Pertinent labs include:  Recent Labs      08/19/17   0230  08/18/17   0209  08/17/17   1202   WBC  13.1*  15.9*  13.6*   HGB  9.9*  11.9*  13.1   HCT  29.8*  35.7*  39.9   PLT  214  223  183     Recent Labs      08/19/17   0230  08/18/17   0209  08/17/17   1439  08/17/17   1202   NA  138  134*   --   137   K  3.2*  3.8   --   4.4   CL  104  100   --   101   CO2  26  27   --   27   GLU  174*  131*   --   123*   BUN  30*  23*   --   31*   CREA  0.64*  0.67*   --   0.96   CA  7.3*  7.9*   --   8.7   ALB   --   1.9*   --   2.0*   TBILI   --   0.6   --   0.6   SGOT   --   54*   --   104*   ALT   --   28   --   39   INR   --    --   1.4*   --        Signed: )Hyunsun Lynwood Favre, NP

## 2017-08-20 LAB
ANION GAP SERPL CALC-SCNC: 5 MMOL/L (ref 5–15)
BUN SERPL-MCNC: 24 MG/DL (ref 6–20)
BUN/CREAT SERPL: 44 (ref 12–20)
CALCIUM SERPL-MCNC: 7.3 MG/DL (ref 8.5–10.1)
CHLORIDE SERPL-SCNC: 107 MMOL/L (ref 97–108)
CO2 SERPL-SCNC: 28 MMOL/L (ref 21–32)
CREAT SERPL-MCNC: 0.55 MG/DL (ref 0.7–1.3)
DATE LAST DOSE: ABNORMAL
ERYTHROCYTE [DISTWIDTH] IN BLOOD BY AUTOMATED COUNT: 14.3 % (ref 11.5–14.5)
ERYTHROCYTE [SEDIMENTATION RATE] IN BLOOD: 67 MM/HR (ref 0–20)
GLUCOSE BLD STRIP.AUTO-MCNC: 120 MG/DL (ref 65–100)
GLUCOSE BLD STRIP.AUTO-MCNC: 121 MG/DL (ref 65–100)
GLUCOSE BLD STRIP.AUTO-MCNC: 122 MG/DL (ref 65–100)
GLUCOSE BLD STRIP.AUTO-MCNC: 137 MG/DL (ref 65–100)
GLUCOSE SERPL-MCNC: 130 MG/DL (ref 65–100)
HCT VFR BLD AUTO: 29.7 % (ref 36.6–50.3)
HGB BLD-MCNC: 9.8 G/DL (ref 12.1–17)
MCH RBC QN AUTO: 32 PG (ref 26–34)
MCHC RBC AUTO-ENTMCNC: 33 G/DL (ref 30–36.5)
MCV RBC AUTO: 97.1 FL (ref 80–99)
PLATELET # BLD AUTO: 230 K/UL (ref 150–400)
POTASSIUM SERPL-SCNC: 3.4 MMOL/L (ref 3.5–5.1)
RBC # BLD AUTO: 3.06 M/UL (ref 4.1–5.7)
REPORTED DOSE,DOSE: ABNORMAL UNITS
REPORTED DOSE/TIME,TMG: ABNORMAL
SERVICE CMNT-IMP: ABNORMAL
SODIUM SERPL-SCNC: 140 MMOL/L (ref 136–145)
TROPONIN I SERPL-MCNC: 0.19 NG/ML
VANCOMYCIN TROUGH SERPL-MCNC: 20.4 UG/ML (ref 5–10)
WBC # BLD AUTO: 16.6 K/UL (ref 4.1–11.1)

## 2017-08-20 PROCEDURE — 74011250637 HC RX REV CODE- 250/637: Performed by: INTERNAL MEDICINE

## 2017-08-20 PROCEDURE — 65660000000 HC RM CCU STEPDOWN

## 2017-08-20 PROCEDURE — 74011250637 HC RX REV CODE- 250/637: Performed by: NURSE PRACTITIONER

## 2017-08-20 PROCEDURE — 36415 COLL VENOUS BLD VENIPUNCTURE: CPT | Performed by: NURSE PRACTITIONER

## 2017-08-20 PROCEDURE — 74011000250 HC RX REV CODE- 250: Performed by: INTERNAL MEDICINE

## 2017-08-20 PROCEDURE — 85027 COMPLETE CBC AUTOMATED: CPT | Performed by: NURSE PRACTITIONER

## 2017-08-20 PROCEDURE — 82962 GLUCOSE BLOOD TEST: CPT

## 2017-08-20 PROCEDURE — 74011636637 HC RX REV CODE- 636/637: Performed by: INTERNAL MEDICINE

## 2017-08-20 PROCEDURE — 74011000258 HC RX REV CODE- 258: Performed by: NURSE PRACTITIONER

## 2017-08-20 PROCEDURE — 80048 BASIC METABOLIC PNL TOTAL CA: CPT | Performed by: NURSE PRACTITIONER

## 2017-08-20 PROCEDURE — 93005 ELECTROCARDIOGRAM TRACING: CPT

## 2017-08-20 PROCEDURE — 86140 C-REACTIVE PROTEIN: CPT | Performed by: ORTHOPAEDIC SURGERY

## 2017-08-20 PROCEDURE — 85652 RBC SED RATE AUTOMATED: CPT | Performed by: NURSE PRACTITIONER

## 2017-08-20 PROCEDURE — 74011250636 HC RX REV CODE- 250/636: Performed by: NURSE PRACTITIONER

## 2017-08-20 PROCEDURE — 84484 ASSAY OF TROPONIN QUANT: CPT

## 2017-08-20 PROCEDURE — 77010033678 HC OXYGEN DAILY

## 2017-08-20 PROCEDURE — C9113 INJ PANTOPRAZOLE SODIUM, VIA: HCPCS | Performed by: INTERNAL MEDICINE

## 2017-08-20 PROCEDURE — 74011250636 HC RX REV CODE- 250/636: Performed by: INTERNAL MEDICINE

## 2017-08-20 RX ORDER — POTASSIUM CHLORIDE 750 MG/1
20 TABLET, FILM COATED, EXTENDED RELEASE ORAL
Status: COMPLETED | OUTPATIENT
Start: 2017-08-20 | End: 2017-08-20

## 2017-08-20 RX ORDER — VANCOMYCIN 1.75 GRAM/500 ML IN 0.9 % SODIUM CHLORIDE INTRAVENOUS
1750 EVERY 12 HOURS
Status: DISCONTINUED | OUTPATIENT
Start: 2017-08-20 | End: 2017-08-23

## 2017-08-20 RX ORDER — MORPHINE SULFATE 2 MG/ML
2 INJECTION, SOLUTION INTRAMUSCULAR; INTRAVENOUS ONCE
Status: ACTIVE | OUTPATIENT
Start: 2017-08-20 | End: 2017-08-20

## 2017-08-20 RX ORDER — RANOLAZINE 500 MG/1
500 TABLET, EXTENDED RELEASE ORAL 2 TIMES DAILY
Status: DISCONTINUED | OUTPATIENT
Start: 2017-08-20 | End: 2017-08-30 | Stop reason: HOSPADM

## 2017-08-20 RX ADMIN — ATORVASTATIN CALCIUM 10 MG: 10 TABLET, FILM COATED ORAL at 10:14

## 2017-08-20 RX ADMIN — CASTOR OIL AND BALSAM, PERU: 788; 87 OINTMENT TOPICAL at 10:16

## 2017-08-20 RX ADMIN — HYDROMORPHONE HYDROCHLORIDE 0.5 MG: 1 INJECTION, SOLUTION INTRAMUSCULAR; INTRAVENOUS; SUBCUTANEOUS at 12:02

## 2017-08-20 RX ADMIN — CEFEPIME HYDROCHLORIDE 2 G: 2 INJECTION, POWDER, FOR SOLUTION INTRAVENOUS at 02:58

## 2017-08-20 RX ADMIN — GABAPENTIN 600 MG: 300 CAPSULE ORAL at 15:36

## 2017-08-20 RX ADMIN — CEFEPIME HYDROCHLORIDE 2 G: 2 INJECTION, POWDER, FOR SOLUTION INTRAVENOUS at 10:13

## 2017-08-20 RX ADMIN — POLYETHYLENE GLYCOL 3350 17 G: 17 POWDER, FOR SOLUTION ORAL at 10:15

## 2017-08-20 RX ADMIN — ENOXAPARIN SODIUM 40 MG: 40 INJECTION SUBCUTANEOUS at 10:13

## 2017-08-20 RX ADMIN — DOCUSATE SODIUM AND SENNOSIDES 1 TABLET: 8.6; 5 TABLET, FILM COATED ORAL at 10:14

## 2017-08-20 RX ADMIN — DOCUSATE SODIUM AND SENNOSIDES 1 TABLET: 8.6; 5 TABLET, FILM COATED ORAL at 19:01

## 2017-08-20 RX ADMIN — FLUTICASONE FUROATE AND VILANTEROL TRIFENATATE 1 PUFF: 100; 25 POWDER RESPIRATORY (INHALATION) at 10:16

## 2017-08-20 RX ADMIN — LIDOCAINE HYDROCHLORIDE 5 ML: 20 SOLUTION ORAL; TOPICAL at 12:15

## 2017-08-20 RX ADMIN — RANOLAZINE 500 MG: 500 TABLET, FILM COATED, EXTENDED RELEASE ORAL at 10:14

## 2017-08-20 RX ADMIN — HYDROMORPHONE HYDROCHLORIDE 0.5 MG: 1 INJECTION, SOLUTION INTRAMUSCULAR; INTRAVENOUS; SUBCUTANEOUS at 19:26

## 2017-08-20 RX ADMIN — CARVEDILOL 3.12 MG: 3.12 TABLET, FILM COATED ORAL at 19:00

## 2017-08-20 RX ADMIN — SODIUM CHLORIDE 40 MG: 9 INJECTION INTRAMUSCULAR; INTRAVENOUS; SUBCUTANEOUS at 10:13

## 2017-08-20 RX ADMIN — RANOLAZINE 500 MG: 500 TABLET, FILM COATED, EXTENDED RELEASE ORAL at 19:00

## 2017-08-20 RX ADMIN — ACETAMINOPHEN 1000 MG: 500 TABLET, FILM COATED ORAL at 22:07

## 2017-08-20 RX ADMIN — ISOSORBIDE MONONITRATE 30 MG: 30 TABLET, EXTENDED RELEASE ORAL at 10:14

## 2017-08-20 RX ADMIN — GABAPENTIN 600 MG: 300 CAPSULE ORAL at 22:08

## 2017-08-20 RX ADMIN — LIDOCAINE HYDROCHLORIDE 5 ML: 20 SOLUTION ORAL; TOPICAL at 19:03

## 2017-08-20 RX ADMIN — ACETAMINOPHEN 1000 MG: 500 TABLET, FILM COATED ORAL at 10:13

## 2017-08-20 RX ADMIN — Medication 10 ML: at 22:08

## 2017-08-20 RX ADMIN — NITROGLYCERIN 0.4 MG: 0.4 TABLET SUBLINGUAL at 05:05

## 2017-08-20 RX ADMIN — Medication 10 ML: at 15:36

## 2017-08-20 RX ADMIN — Medication 1 CAPSULE: at 10:13

## 2017-08-20 RX ADMIN — POTASSIUM CHLORIDE 20 MEQ: 750 TABLET, FILM COATED, EXTENDED RELEASE ORAL at 15:36

## 2017-08-20 RX ADMIN — OXYCODONE HYDROCHLORIDE 10 MG: 5 TABLET ORAL at 10:13

## 2017-08-20 RX ADMIN — LEVOTHYROXINE SODIUM 50 MCG: 50 TABLET ORAL at 10:14

## 2017-08-20 RX ADMIN — MONTELUKAST SODIUM 10 MG: 10 TABLET, FILM COATED ORAL at 10:14

## 2017-08-20 RX ADMIN — DULOXETINE HYDROCHLORIDE 30 MG: 30 CAPSULE, DELAYED RELEASE ORAL at 10:14

## 2017-08-20 RX ADMIN — LIDOCAINE HYDROCHLORIDE 5 ML: 20 SOLUTION ORAL; TOPICAL at 10:16

## 2017-08-20 RX ADMIN — INSULIN LISPRO 2 UNITS: 100 INJECTION, SOLUTION INTRAVENOUS; SUBCUTANEOUS at 12:02

## 2017-08-20 RX ADMIN — ONDANSETRON 4 MG: 2 INJECTION INTRAMUSCULAR; INTRAVENOUS at 12:02

## 2017-08-20 RX ADMIN — DIAZEPAM 5 MG: 5 TABLET ORAL at 05:13

## 2017-08-20 RX ADMIN — HYDROMORPHONE HYDROCHLORIDE 0.5 MG: 1 INJECTION, SOLUTION INTRAMUSCULAR; INTRAVENOUS; SUBCUTANEOUS at 00:10

## 2017-08-20 RX ADMIN — Medication 10 ML: at 05:47

## 2017-08-20 RX ADMIN — ASPIRIN 81 MG: 81 TABLET, COATED ORAL at 10:14

## 2017-08-20 RX ADMIN — LISINOPRIL 2.5 MG: 5 TABLET ORAL at 10:14

## 2017-08-20 RX ADMIN — NITROGLYCERIN 0.4 MG: 0.4 TABLET SUBLINGUAL at 04:54

## 2017-08-20 RX ADMIN — DIAZEPAM 5 MG: 5 TABLET ORAL at 15:35

## 2017-08-20 RX ADMIN — CARVEDILOL 3.12 MG: 3.12 TABLET, FILM COATED ORAL at 10:13

## 2017-08-20 RX ADMIN — ACETAMINOPHEN 1000 MG: 500 TABLET, FILM COATED ORAL at 15:36

## 2017-08-20 RX ADMIN — TAMSULOSIN HYDROCHLORIDE 0.8 MG: 0.4 CAPSULE ORAL at 10:14

## 2017-08-20 RX ADMIN — VANCOMYCIN HYDROCHLORIDE 1500 MG: 10 INJECTION, POWDER, LYOPHILIZED, FOR SOLUTION INTRAVENOUS at 00:47

## 2017-08-20 RX ADMIN — CEFEPIME HYDROCHLORIDE 2 G: 2 INJECTION, POWDER, FOR SOLUTION INTRAVENOUS at 19:01

## 2017-08-20 RX ADMIN — GABAPENTIN 600 MG: 300 CAPSULE ORAL at 10:13

## 2017-08-20 RX ADMIN — VANCOMYCIN HYDROCHLORIDE 1750 MG: 10 INJECTION, POWDER, LYOPHILIZED, FOR SOLUTION INTRAVENOUS at 12:15

## 2017-08-20 RX ADMIN — HYDROMORPHONE HYDROCHLORIDE 0.5 MG: 1 INJECTION, SOLUTION INTRAMUSCULAR; INTRAVENOUS; SUBCUTANEOUS at 03:54

## 2017-08-20 NOTE — PROGRESS NOTES
Problem: Falls - Risk of  Goal: *Absence of Falls  Document Maximino Fall Risk and appropriate interventions in the flowsheet.    Outcome: Progressing Towards Goal  Fall Risk Interventions:  Mobility Interventions: Patient to call before getting OOB     Mentation Interventions: More frequent rounding, Door open when patient unattended     Medication Interventions: Patient to call before getting OOB     Elimination Interventions: Call light in reach     History of Falls Interventions: Door open when patient unattended, Room close to nurse's station

## 2017-08-20 NOTE — PROGRESS NOTES
Hospitalist Progress Note    NAME: Estella Gil   :  1932   MRN:  [id number]       Interim Hospital Summary: 80 y.o. male whom presented on 2017 with      Assessment / Plan:  HCAP  - CXR revaled Left upper lobe atelectasis/consolidation. - continue with IV Cefepime and Vanc. Pt developed severe allergic reaction after taking levofloxacin which resulted severe canker sores. - O2 Sat 96% at 4L of O2 via n/c; wean O2 as tolerate    Canker sores  - magic mouth wash; asked nursing staff to help with oral care using sponges or swish and spit when pt is able     Chest Pain  Elevated Troponin  History of CAD s/p remote PCI  Stress Test negative on 17  Hyperlipidemia  - discussed with the family and pt yesterday in regards of code status and goals of care. Patient and the family requested continue with current care, do not wish to have any invasive procedures. Pt and the family asked for optimal pain control. Will consult palliative team to follow. - on and off chest pain, added Ranexa per Dr. Radha Gonzalez. He feels better this afternoon  - EKG with TWI in V1-V5  - troponin went up to 0.55 from 0.32  - cardiology following; communicated with cardiology team on pt's wish. Conservative medical management. Cardiology team will follow as need basis  - Echo: Ejection fraction was estimated in the range of 15 % to 20 %  - continue ASA, BB& IMDUR  - continue statin      Abdominal Pain: now resolved  Nausea/vomiting  - CT Abdomen/Pelvis: Distended gallbladder. Infrarenal abdominal aortic aneurysm measures 5.3 x 5 cm (no changes from previous images), Colonic diverticulosis. - Abdominal ultrasound:Sludge layers dependently in the nondistended gallbladder.  No wall thickening or pericholecystic fluid.    - Avoid NSAID  - IV PPI & IV antiemetics  - stop diclofenac      Chronic Impacted Left Femoral Head Fracture: ideally needs total hip replacement to control pain  - continue with fentanyl patch  - scheduled Acetaminophen  - prn Oxycodone or dilaudid, valium as need for severe muscle spasm  - Dr. Jenae Victoria spoke with Dr. Pradeep Peoples in Radiology to get addendum added on to patient CT A/P which was preformed [facility] detailing patient's left hip pathology  - ortho following; pain management, conservative management      Sacral Decub:  - consult wound care; STAGE 4 decubitus ulcer      Type 2 diabetes mellitus without complication, without long-term current use of insulin   Diabetic polyneuropathy  - hold home oral antihyperglycemics and place on SSI  - Hgb A1c 6.6  - continue home Gabapentin      Abdominal aortic aneurysm (AAA)   - stable on today's CT:Infrarenal abdominal aortic aneurysm measures 5.3 x 5 cm      Obesity  Severe Protein Calorie Malnutrition  -consult Nutrition      Code Status: DNR      DVT Prophylaxis: Lovenox  GI Prophylaxis: PPI as above      Baseline: Bed bound for last 3 months per patient report      Recommended Disposition:SNF     May transfer this patient to telemetry unit from step down         Subjective:     Chief Complaint / Reason for Physician Visit  \"I feel better than this morning\". Discussed with RN events overnight. Review of Systems:  Symptom Y/N Comments  Symptom Y/N Comments   Fever/Chills n   Chest Pain * Better this afternoon   Poor Appetite y   Edema     Cough    Abdominal Pain     Sputum    Joint Pain     SOB/LEWIS n   Pruritis/Rash     Nausea/vomit y   Tolerating PT/OT     Diarrhea n   Tolerating Diet     Constipation n   Other       Could NOT obtain due to:      Objective:     VITALS:   Last 24hrs VS reviewed since prior progress note.  Most recent are:  Patient Vitals for the past 24 hrs:   Temp Pulse Resp BP SpO2   08/20/17 1133 97.4 °F (36.3 °C) 90 16 120/75 96 %   08/20/17 0754 97.5 °F (36.4 °C) 91 16 143/77 96 %   08/20/17 0505 - (!) 103 - 124/77 -   08/20/17 0442 - 95 20 (!) 135/112 -   08/20/17 0314 97.9 °F (36.6 °C) 95 18 148/81 96 %   08/19/17 2327 - 89 16 116/76 96 % 08/19/17 1919 97.5 °F (36.4 °C) 93 18 136/81 94 %   08/19/17 1600 97.6 °F (36.4 °C) 90 16 125/75 96 %       Intake/Output Summary (Last 24 hours) at 08/20/17 1213  Last data filed at 08/20/17 0656   Gross per 24 hour   Intake             1700 ml   Output              900 ml   Net              800 ml        PHYSICAL EXAM:  General: Pale, ill appearing, Alert, cooperative, no acute distress    EENT:  EOMI. Anicteric sclerae. MMM  Resp:  CTA bilaterally, no wheezing or rales. No accessory muscle use  CV:  Regular  rhythm,  No edema  GI:  Soft, obese, Non tender.  +Bowel sounds  Neurologic:  Alert and oriented X self, place, and able to recognize his daughter, normal speech,   Psych:   Fair insight. Not anxious nor agitated  Skin:  Stage 4 decubitus ulcer; clean, not infectious. No jaundice    Reviewed most current lab test results and cultures  YES  Reviewed most current radiology test results   YES  Review and summation of old records today    NO  Reviewed patient's current orders and MAR    YES  PMH/SH reviewed - no change compared to H&P  ________________________________________________________________________  Care Plan discussed with:    Comments   Patient y    Family  y    RN y    Care Manager     Consultant  y Dr. Zoya Hines                    y [address]es team rounds were held today with , nursing, pharmacist and clinical coordinator. Patient's plan of care was discussed; medications were reviewed and discharge planning was addressed.      ________________________________________________________________________  Total NON critical care TIME:  30   Minutes    Total CRITICAL CARE TIME Spent:   Minutes non procedure based      Comments   >50% of visit spent in counseling and coordination of care     ________________________________________________________________________  Cesar Palafox NP     Procedures: see electronic medical records for all procedures/Xrays and details which were not copied into this note but were reviewed prior to creation of Plan. LABS:  I reviewed today's most current labs and imaging studies.   Pertinent labs include:  Recent Labs      08/20/17 0302 08/19/17 0230 08/18/17 0209   WBC  16.6*  13.1*  15.9*   HGB  9.8*  9.9*  11.9*   HCT  29.7*  29.8*  35.7*   PLT  230  214  223     Recent Labs      08/20/17 0302 08/19/17 0230 08/18/17 0209 08/17/17   1439   NA  140  138  134*   --    K  3.4*  3.2*  3.8   --    CL  107  104  100   --    CO2  28  26  27   --    GLU  130*  174*  131*   --    BUN  24*  30*  23*   --    CREA  0.55*  0.64*  0.67*   --    CA  7.3*  7.3*  7.9*   --    ALB   --    --   1.9*   --    TBILI   --    --   0.6   --    SGOT   --    --   54*   --    ALT   --    --   28   --    INR   --    --    --   1.4*       Signed: )Torres Azevedo, NP

## 2017-08-20 NOTE — PROGRESS NOTES
6611 Bedside report received from Mer Aponte RN. Patient in bed resting at this time. VS as noted. Bed alarm in place for patient safety and call bell with in reach. 1013 Patient moaning out in pain. Rating pain 5/10 \"all over\". Medicated with 10mg oxycodone as ordered for patient comfort. 1202 Patient moaning out in pain. Rating as 5/10 \"all over\" and complaining of nausea. Medicated with 0.5mg IV Dilaudid as ordered for patient comfort and 4mg IV zofran as ordered for nausea. One of patient's daughters in and requesting to speak to MD. [place] hospitalist covering,  Nicola Dockery NP. She will come and talk with daughter. 1300 Patient resting at this time. Patient only took a few bites of lunch. 32 [phone] 16 Patient requesting valium, feeling anxious. Patient medicated with 5mg po valium. Family remains at bedside. 1724 Patient resting at this time. Will monitor. 1928 Bedside report given to Cecy Caldwell RN.  Patient medicated with 0.5mg IV Dialudid for patient comfort

## 2017-08-20 NOTE — PROGRESS NOTES
1915: Bedside report received from Radha Brar. Patient resting comfortably in bed. Vitals stable. Normal sinus rhythm on the monitor. 1930: Transport here to take patient down for x ray. 2000: Patient back from x ray and moaning out in pain. Medicated with 0.5mg dilaudid as ordered. Will monitor. 2215: Patient took 1 valium and 1 tylenol whole with water before he started gagging and threw up a small amount of clear thick sputum. Offered to crush other meds in applesauce but patient refused. Other po medications held at this time. 4955: Patient complaining of chest pain 10 out of 10. EKG obtained and patient given nitro tablet which relieved pain to a 5 out of 10. Second nitro tablet administered and patient medicated with valium as ordered. Pain reduced to a 3 out of 10 . Dr. Anant Steele was notified and a troponin was added onto the patients am lab work. 0600:Troponin 0.19 last recorded troponin was 0.55 on 8/18.

## 2017-08-20 NOTE — PROGRESS NOTES
Ortho Progress Note:    XR of L hip reviewed by Dr. Massiel Kilpatrick, Sed Rate 67 with CRP pending    Continued Hip Pain, +TTP       Plan:  Consult to IR for aspiration of L hip to rule out septic jt  Continue current care as per medicine   NPO after midnight

## 2017-08-20 NOTE — PROGRESS NOTES
Pharmacy Automatic Renal Dosing Protocol - Antimicrobials    Indication for Antimicrobials: HAP    Current Regimen of Each Antimicrobial (Start Day & Day of Therapy):  Cefepime 2 gm IV every 8 hours (Started 17; Day #3)  Vancomycin 1500 mg IV every 12 hours (Started 17; Day #3)    Goal Vancomycin Trough: 15-20 mcg/mL    Vancomycin Trough (17 at 2316): 20.4 mcg/mL (True trough = 13.6 mcg/mL)    Significant Cultures:   17 Wound culture = Results pending  17 Blood culture = No growth x 2 days (Results pending)    Recent Labs      17   0302  17   0230  17   0209   CREA  0.55*  0.64*  0.67*   BUN  24*  30*  23*   WBC  16.6*  13.1*  15.9*     Temp (24hrs), Av.6 ° F (36.4 ° C), Min:97.5 ° F (36.4 ° C), Max:97.9 ° F (36.6 ° C)    Creatinine Clearance:  ~84 mL/min    Impression/Plan:   - Cefepime dosed appropriately based on indication and renal function. Continue current regimen. - When corrected for dose timing (the dose immediately prior to the vancomycin trough was given 3.5 hour late), the true trough is slightly below goal. Will adjust vancomycin to 1750 mg Iv every 12 hours to achieve goal trough. Pharmacy will follow daily and adjust medications as appropriate for renal function and/or serum levels.     Thank you,  Jose D Hartmann, PHARMD

## 2017-08-21 ENCOUNTER — APPOINTMENT (OUTPATIENT)
Dept: GENERAL RADIOLOGY | Age: 82
DRG: 177 | End: 2017-08-21
Attending: NURSE PRACTITIONER
Payer: MEDICARE

## 2017-08-21 LAB
ANION GAP SERPL CALC-SCNC: 4 MMOL/L (ref 5–15)
APPEARANCE FLD: ABNORMAL
ATRIAL RATE: 93 BPM
BUN SERPL-MCNC: 18 MG/DL (ref 6–20)
BUN/CREAT SERPL: 37 (ref 12–20)
CALCIUM SERPL-MCNC: 7.2 MG/DL (ref 8.5–10.1)
CALCULATED P AXIS, ECG09: 27 DEGREES
CALCULATED R AXIS, ECG10: 5 DEGREES
CALCULATED T AXIS, ECG11: 145 DEGREES
CHLORIDE SERPL-SCNC: 109 MMOL/L (ref 97–108)
CO2 SERPL-SCNC: 29 MMOL/L (ref 21–32)
COLOR FLD: ABNORMAL
CREAT SERPL-MCNC: 0.49 MG/DL (ref 0.7–1.3)
CRP SERPL-MCNC: 2.68 MG/DL (ref 0–0.6)
CRP SERPL-MCNC: 2.74 MG/DL (ref 0–0.6)
DIAGNOSIS, 93000: NORMAL
EOSINOPHIL NFR FLD MANUAL: 2 %
ERYTHROCYTE [DISTWIDTH] IN BLOOD BY AUTOMATED COUNT: 14.3 % (ref 11.5–14.5)
GLUCOSE BLD STRIP.AUTO-MCNC: 106 MG/DL (ref 65–100)
GLUCOSE BLD STRIP.AUTO-MCNC: 125 MG/DL (ref 65–100)
GLUCOSE BLD STRIP.AUTO-MCNC: 129 MG/DL (ref 65–100)
GLUCOSE BLD STRIP.AUTO-MCNC: 95 MG/DL (ref 65–100)
GLUCOSE SERPL-MCNC: 102 MG/DL (ref 65–100)
HCT VFR BLD AUTO: 27.6 % (ref 36.6–50.3)
HGB BLD-MCNC: 8.9 G/DL (ref 12.1–17)
LYMPHOCYTES NFR FLD: 8 %
MCH RBC QN AUTO: 31.9 PG (ref 26–34)
MCHC RBC AUTO-ENTMCNC: 32.2 G/DL (ref 30–36.5)
MCV RBC AUTO: 98.9 FL (ref 80–99)
MONOS+MACROS NFR FLD: 1 %
NEUTROPHILS NFR FLD: 89 %
NUC CELL # FLD: 260 /CU MM (ref 0–5)
P-R INTERVAL, ECG05: 182 MS
PLATELET # BLD AUTO: 218 K/UL (ref 150–400)
POTASSIUM SERPL-SCNC: 3.2 MMOL/L (ref 3.5–5.1)
Q-T INTERVAL, ECG07: 394 MS
QRS DURATION, ECG06: 102 MS
QTC CALCULATION (BEZET), ECG08: 489 MS
RBC # BLD AUTO: 2.79 M/UL (ref 4.1–5.7)
RBC # FLD: >100 /CU MM
SERVICE CMNT-IMP: ABNORMAL
SERVICE CMNT-IMP: NORMAL
SODIUM SERPL-SCNC: 142 MMOL/L (ref 136–145)
SPECIMEN SOURCE FLD: ABNORMAL
VENTRICULAR RATE, ECG03: 93 BPM
WBC # BLD AUTO: 13.8 K/UL (ref 4.1–11.1)

## 2017-08-21 PROCEDURE — 0S9B3ZX DRAINAGE OF LEFT HIP JOINT, PERCUTANEOUS APPROACH, DIAGNOSTIC: ICD-10-PCS | Performed by: RADIOLOGY

## 2017-08-21 PROCEDURE — 65660000000 HC RM CCU STEPDOWN

## 2017-08-21 PROCEDURE — 80048 BASIC METABOLIC PNL TOTAL CA: CPT | Performed by: NURSE PRACTITIONER

## 2017-08-21 PROCEDURE — 82962 GLUCOSE BLOOD TEST: CPT

## 2017-08-21 PROCEDURE — 74011250636 HC RX REV CODE- 250/636: Performed by: INTERNAL MEDICINE

## 2017-08-21 PROCEDURE — 77030014113 XR INJ ASP LARGE JOINT / BURSA

## 2017-08-21 PROCEDURE — 85027 COMPLETE CBC AUTOMATED: CPT | Performed by: NURSE PRACTITIONER

## 2017-08-21 PROCEDURE — 89050 BODY FLUID CELL COUNT: CPT | Performed by: PHYSICIAN ASSISTANT

## 2017-08-21 PROCEDURE — 74011250636 HC RX REV CODE- 250/636: Performed by: NURSE PRACTITIONER

## 2017-08-21 PROCEDURE — 74011000250 HC RX REV CODE- 250: Performed by: RADIOLOGY

## 2017-08-21 PROCEDURE — C9113 INJ PANTOPRAZOLE SODIUM, VIA: HCPCS | Performed by: INTERNAL MEDICINE

## 2017-08-21 PROCEDURE — 74011250637 HC RX REV CODE- 250/637: Performed by: INTERNAL MEDICINE

## 2017-08-21 PROCEDURE — 36415 COLL VENOUS BLD VENIPUNCTURE: CPT | Performed by: NURSE PRACTITIONER

## 2017-08-21 PROCEDURE — 74011000258 HC RX REV CODE- 258: Performed by: NURSE PRACTITIONER

## 2017-08-21 PROCEDURE — 86140 C-REACTIVE PROTEIN: CPT | Performed by: NURSE PRACTITIONER

## 2017-08-21 PROCEDURE — 74011250637 HC RX REV CODE- 250/637: Performed by: NURSE PRACTITIONER

## 2017-08-21 PROCEDURE — 74011000250 HC RX REV CODE- 250: Performed by: INTERNAL MEDICINE

## 2017-08-21 PROCEDURE — 93005 ELECTROCARDIOGRAM TRACING: CPT

## 2017-08-21 PROCEDURE — 87205 SMEAR GRAM STAIN: CPT | Performed by: PHYSICIAN ASSISTANT

## 2017-08-21 RX ORDER — SODIUM CHLORIDE 9 MG/ML
INJECTION INTRAMUSCULAR; INTRAVENOUS; SUBCUTANEOUS
Status: DISPENSED
Start: 2017-08-21 | End: 2017-08-22

## 2017-08-21 RX ORDER — SODIUM CHLORIDE 9 MG/ML
3 INJECTION INTRAMUSCULAR; INTRAVENOUS; SUBCUTANEOUS
Status: COMPLETED | OUTPATIENT
Start: 2017-08-21 | End: 2017-08-21

## 2017-08-21 RX ORDER — POTASSIUM CHLORIDE 7.45 MG/ML
10 INJECTION INTRAVENOUS
Status: COMPLETED | OUTPATIENT
Start: 2017-08-21 | End: 2017-08-22

## 2017-08-21 RX ADMIN — LEVOTHYROXINE SODIUM 50 MCG: 50 TABLET ORAL at 16:36

## 2017-08-21 RX ADMIN — CARVEDILOL 3.12 MG: 3.12 TABLET, FILM COATED ORAL at 09:49

## 2017-08-21 RX ADMIN — SODIUM CHLORIDE 40 MG: 9 INJECTION INTRAMUSCULAR; INTRAVENOUS; SUBCUTANEOUS at 09:49

## 2017-08-21 RX ADMIN — LIDOCAINE HYDROCHLORIDE 5 ML: 20 SOLUTION ORAL; TOPICAL at 09:50

## 2017-08-21 RX ADMIN — CEFEPIME HYDROCHLORIDE 2 G: 2 INJECTION, POWDER, FOR SOLUTION INTRAVENOUS at 09:49

## 2017-08-21 RX ADMIN — CEFEPIME HYDROCHLORIDE 2 G: 2 INJECTION, POWDER, FOR SOLUTION INTRAVENOUS at 02:28

## 2017-08-21 RX ADMIN — VANCOMYCIN HYDROCHLORIDE 1750 MG: 10 INJECTION, POWDER, LYOPHILIZED, FOR SOLUTION INTRAVENOUS at 12:54

## 2017-08-21 RX ADMIN — RANOLAZINE 500 MG: 500 TABLET, FILM COATED, EXTENDED RELEASE ORAL at 16:36

## 2017-08-21 RX ADMIN — ASPIRIN 81 MG: 81 TABLET, COATED ORAL at 16:37

## 2017-08-21 RX ADMIN — POLYETHYLENE GLYCOL 3350 17 G: 17 POWDER, FOR SOLUTION ORAL at 16:36

## 2017-08-21 RX ADMIN — CEFEPIME HYDROCHLORIDE 2 G: 2 INJECTION, POWDER, FOR SOLUTION INTRAVENOUS at 19:01

## 2017-08-21 RX ADMIN — Medication 10 ML: at 21:26

## 2017-08-21 RX ADMIN — DOCUSATE SODIUM AND SENNOSIDES 1 TABLET: 8.6; 5 TABLET, FILM COATED ORAL at 16:37

## 2017-08-21 RX ADMIN — GABAPENTIN 600 MG: 300 CAPSULE ORAL at 16:37

## 2017-08-21 RX ADMIN — Medication 10 ML: at 06:10

## 2017-08-21 RX ADMIN — VANCOMYCIN HYDROCHLORIDE 1750 MG: 10 INJECTION, POWDER, LYOPHILIZED, FOR SOLUTION INTRAVENOUS at 00:20

## 2017-08-21 RX ADMIN — CASTOR OIL AND BALSAM, PERU: 788; 87 OINTMENT TOPICAL at 09:50

## 2017-08-21 RX ADMIN — LIDOCAINE HYDROCHLORIDE 5 ML: 20 SOLUTION ORAL; TOPICAL at 12:54

## 2017-08-21 RX ADMIN — GABAPENTIN 600 MG: 300 CAPSULE ORAL at 21:25

## 2017-08-21 RX ADMIN — HYDROMORPHONE HYDROCHLORIDE 0.5 MG: 1 INJECTION, SOLUTION INTRAMUSCULAR; INTRAVENOUS; SUBCUTANEOUS at 15:27

## 2017-08-21 RX ADMIN — Medication 10 ML: at 15:27

## 2017-08-21 RX ADMIN — ATORVASTATIN CALCIUM 10 MG: 10 TABLET, FILM COATED ORAL at 16:36

## 2017-08-21 RX ADMIN — TAMSULOSIN HYDROCHLORIDE 0.8 MG: 0.4 CAPSULE ORAL at 16:37

## 2017-08-21 RX ADMIN — HYDROMORPHONE HYDROCHLORIDE 0.5 MG: 1 INJECTION, SOLUTION INTRAMUSCULAR; INTRAVENOUS; SUBCUTANEOUS at 19:07

## 2017-08-21 RX ADMIN — Medication 1 CAPSULE: at 16:36

## 2017-08-21 RX ADMIN — POTASSIUM CHLORIDE 10 MEQ: 10 INJECTION, SOLUTION INTRAVENOUS at 17:52

## 2017-08-21 RX ADMIN — CARVEDILOL 3.12 MG: 3.12 TABLET, FILM COATED ORAL at 16:35

## 2017-08-21 RX ADMIN — POTASSIUM CHLORIDE 10 MEQ: 10 INJECTION, SOLUTION INTRAVENOUS at 19:01

## 2017-08-21 RX ADMIN — ISOSORBIDE MONONITRATE 30 MG: 30 TABLET, EXTENDED RELEASE ORAL at 09:49

## 2017-08-21 RX ADMIN — GABAPENTIN 600 MG: 300 CAPSULE ORAL at 09:49

## 2017-08-21 RX ADMIN — DIAZEPAM 5 MG: 5 TABLET ORAL at 10:55

## 2017-08-21 RX ADMIN — SODIUM CHLORIDE 3 ML: 9 INJECTION, SOLUTION INTRAMUSCULAR; INTRAVENOUS; SUBCUTANEOUS at 14:00

## 2017-08-21 RX ADMIN — POTASSIUM CHLORIDE 10 MEQ: 10 INJECTION, SOLUTION INTRAVENOUS at 20:36

## 2017-08-21 RX ADMIN — LIDOCAINE HYDROCHLORIDE 5 ML: 20 SOLUTION ORAL; TOPICAL at 16:38

## 2017-08-21 RX ADMIN — FLUTICASONE FUROATE AND VILANTEROL TRIFENATATE 1 PUFF: 100; 25 POWDER RESPIRATORY (INHALATION) at 09:50

## 2017-08-21 RX ADMIN — MONTELUKAST SODIUM 10 MG: 10 TABLET, FILM COATED ORAL at 16:36

## 2017-08-21 RX ADMIN — ACETAMINOPHEN 1000 MG: 500 TABLET, FILM COATED ORAL at 16:35

## 2017-08-21 RX ADMIN — HYDROMORPHONE HYDROCHLORIDE 0.5 MG: 1 INJECTION, SOLUTION INTRAMUSCULAR; INTRAVENOUS; SUBCUTANEOUS at 12:54

## 2017-08-21 RX ADMIN — LISINOPRIL 2.5 MG: 5 TABLET ORAL at 16:37

## 2017-08-21 RX ADMIN — ENOXAPARIN SODIUM 40 MG: 40 INJECTION SUBCUTANEOUS at 16:35

## 2017-08-21 RX ADMIN — ACETAMINOPHEN 1000 MG: 500 TABLET, FILM COATED ORAL at 21:25

## 2017-08-21 RX ADMIN — DULOXETINE HYDROCHLORIDE 30 MG: 30 CAPSULE, DELAYED RELEASE ORAL at 16:37

## 2017-08-21 RX ADMIN — DIAZEPAM 5 MG: 5 TABLET ORAL at 21:25

## 2017-08-21 RX ADMIN — POTASSIUM CHLORIDE 10 MEQ: 10 INJECTION, SOLUTION INTRAVENOUS at 16:35

## 2017-08-21 NOTE — PROGRESS NOTES
Hospitalist Progress Note    NAME: Nick Faith   :  1932   MRN:  [id number]       Interim Hospital Summary: 80 y.o. male whom presented on 2017 with      Assessment / Plan:  HCAP  - CXR revaled Left upper lobe atelectasis/consolidation. - continue with IV Cefepime and Vanc. Pt developed severe allergic reaction after taking levofloxacin which resulted severe canker sores. - O2 Sat 96% at 2L of O2 via n/c; wean O2 as tolerate     Canker sores  - magic mouth wash; asked nursing staff to help with oral care using sponges or swish and spit when pt is able. Much improved     Chest Pain  Elevated Troponin  History of CAD s/p remote PCI  Stress Test negative on 17  Hyperlipidemia  - discussed with the family and pt yesterday in regards of code status and goals of care. Patient and the family requested continue with current care, do not wish to have any invasive procedures.  Pt and the family asked for optimal pain control. Will consult palliative team to follow. - no further chest pain since we added Ranexa   - EKG with TWI in V1-V5  - troponin went up to 0.55 from 0.32  - cardiology following; communicated with cardiology team on pt's wish. Conservative medical management. Cardiology team will follow as need basis  - Echo: Ejection fraction was estimated in the range of 15 % to 20 %  - continue ASA, BB, Ranexa, & IMDUR  - continue statin      Abdominal Pain: now resolved  Nausea/vomiting  - CT Abdomen/Pelvis: Distended gallbladder. Infrarenal abdominal aortic aneurysm measures 5.3 x 5 cm (no changes from previous images), Colonic diverticulosis. - Abdominal ultrasound:Sludge layers dependently in the nondistended gallbladder.  No wall thickening or pericholecystic fluid.    - Avoid NSAID  - IV PPI & IV antiemetics  - stop diclofenac      Chronic Impacted Left Femoral Head Fracture: ideally needs total hip replacement to control pain  - continue with fentanyl patch  - scheduled Acetaminophen  - prn Oxycodone or dilaudid, valium as need for severe muscle spasm  - Dr. Julianne Machado spoke with Dr. Marline Sacks in Radiology to get addendum added on to patient CT A/P which was preformed at [facility] detailing patient's left hip pathology  - ortho following; pain management. IR for aspiration to r/o septic joint    Sacral Decub:  - consult wound care; STAGE 4 decubitus ulcer      Type 2 diabetes mellitus without complication, without long-term current use of insulin   Diabetic polyneuropathy  - hold home oral antihyperglycemics and place on SSI  - Hgb A1c 6.6  - continue home Gabapentin      Abdominal aortic aneurysm (AAA)   - stable on today's CT:Infrarenal abdominal aortic aneurysm measures 5.3 x 5 cm      Obesity  Severe Protein Calorie Malnutrition  -consult Nutrition      Code Status: DNR      DVT Prophylaxis: Lovenox  GI Prophylaxis: PPI as above      Baseline: Bed bound for last 3 months per patient report      Recommended Disposition:SNF      May transfer this patient to telemetry unit from step down       Subjective:     Chief Complaint / Reason for Physician Visit  Pt slept through the exam.  Discussed with RN events overnight. Review of Systems:  Symptom Y/N Comments  Symptom Y/N Comments   Fever/Chills    Chest Pain     Poor Appetite    Edema     Cough    Abdominal Pain     Sputum    Joint Pain     SOB/LEWIS    Pruritis/Rash     Nausea/vomit    Tolerating PT/OT     Diarrhea    Tolerating Diet     Constipation    Other       Could NOT obtain due to:      Objective:     VITALS:   Last 24hrs VS reviewed since prior progress note.  Most recent are:  Patient Vitals for the past 24 hrs:   Temp Pulse Resp BP SpO2   08/21/17 0716 97.4 °F (36.3 °C) 89 20 137/90 94 %   08/21/17 0712 - 90 - 137/90 94 %   08/21/17 0316 97.5 °F (36.4 °C) 85 18 137/73 95 %   08/20/17 2306 97.9 °F (36.6 °C) 78 20 119/69 98 %   08/20/17 1909 97.9 °F (36.6 °C) 85 18 (!) 135/91 92 %   08/20/17 1551 97.4 °F (36.3 °C) 83 16 130/73 95 %   08/20/17 1133 97.4 °F (36.3 °C) 90 16 120/75 96 %       Intake/Output Summary (Last 24 hours) at 08/21/17 0955  Last data filed at 08/21/17 3680   Gross per 24 hour   Intake             1680 ml   Output             1325 ml   Net              355 ml        PHYSICAL EXAM:  General: WD, WN. Alert, cooperative, no acute distress    EENT:  EOMI. Anicteric sclerae. MMM  Resp:  CTA bilaterally, no wheezing or rales. No accessory muscle use  CV:  Regular  rhythm,  No edema  GI:  Soft, obese, Non tender.  +Bowel sounds  Neurologic:  Opened eyes briefly during exam then fall asleep. normal speech  Psych:   Fair insight. Not anxious nor agitated  Skin:  No rashes. No jaundice    Reviewed most current lab test results and cultures  YES  Reviewed most current radiology test results   YES  Review and summation of old records today    NO  Reviewed patient's current orders and MAR    YES  PMH/SH reviewed - no change compared to H&P  ________________________________________________________________________  Care Plan discussed with:    Comments   Patient y    Family      RN y    Care Manager y    Consultant  y Ortho                    y Multidiciplinary team rounds were held today with , nursing, pharmacist and clinical coordinator. Patient's plan of care was discussed; medications were reviewed and discharge planning was addressed. ________________________________________________________________________  Total NON critical care TIME:  30   Minutes    Total CRITICAL CARE TIME Spent:   Minutes non procedure based      Comments   >50% of visit spent in counseling and coordination of care     ________________________________________________________________________  Mohinder Adhikari, NP     Procedures: see electronic medical records for all procedures/Xrays and details which were not copied into this note but were reviewed prior to creation of Plan. LABS:  I reviewed today's most current labs and imaging studies.   Pertinent labs include:  Recent Labs      08/21/17   0323  08/20/17   0302  08/19/17   0230   WBC  13.8*  16.6*  13.1*   HGB  8.9*  9.8*  9.9*   HCT  27.6*  29.7*  29.8*   PLT  218  230  214     Recent Labs      08/21/17   0323  08/20/17   0302  08/19/17   0230   NA  142  140  138   K  3.2*  3.4*  3.2*   CL  109*  107  104   CO2  29  28  26   GLU  102*  130*  174*   BUN  18  24*  30*   CREA  0.49*  0.55*  0.64*   CA  7.2*  7.3*  7.3*       Signed: )Torres Azevedo, NP

## 2017-08-21 NOTE — PROGRESS NOTES
Nutrition Assessment:    INTERVENTIONS/RECOMMENDATIONS:   Meals/Snacks: General/healthful diet: Advance to [facility] as medically able; consistency per SLP  Supplements: Commercial supplement: Coordinate appropriate ONS pending SLP recommendations for diet/liquid consistency (Recommend ensure pudding/magic cup for thickened liquids; Glucerna shakes if thin liquids)    ASSESSMENT:   Patient medically noted for pneumonia and aspiration of left hip. PMH for CAD, HTN, and DM. Curerntly NPO for procedure. SLP to see for appropriate diet consistency. Pureed/nectar thick liquids trialed yesterday and reported to tolerate well. Skin breakdown to sacrum and weight loss per chart review noted. Will coordinate appropriate ONS as able to help with intake and provide protein to promote wound healing. Diet Order: NPO  % Eaten:  No data found. Pertinent Medications: [x] Reviewed []Other: Atorvastatin, Coreg, Cymbalta, Humalog, Lurdes Q, Synthroid, Lisinopril, Protonix, Miralax, Pericolace  Pertinent Labs: [x]Reviewed  []Other: K+ 3.2, A1c 6.6, --121  Food Allergies: [x]None []Other:     Last BM: 8/20   [x]Active     []Hyperactive  []Hypoactive       [] Absent  BS  Skin:    [] Intact   [] Incision  [x] Breakdown (stage 2 buttocks per flowsheets)  []Edema   []Other:    Anthropometrics: Height:   Weight: 103.2 kg (227 lb 9.6 oz)    IBW (%IBW):   ( ) UBW (%UBW):   (  %)    BMI: Body mass index is 31.74 kg/(m^2). This BMI is indicative of:  []Underweight   []Normal   []Overweight   [x] Obesity   [] Extreme Obesity (BMI>40)  Last Weight Metrics:  Weight Loss Metrics 8/21/2017 8/17/2017 8/17/2017 8/17/2017 8/13/2017 7/19/2017 7/16/2017   Today's Wt 227 lb 9.6 oz - 217 lb 13 oz - 220 lb 0.3 oz 240 lb 240 lb   BMI - 31.74 kg/m2 - 30.38 kg/m2 30.69 kg/m2 33.47 kg/m2 33.47 kg/m2       Estimated Nutrition Needs (Based on): 2025 Kcals/day (MSJ: 1690 x1.2) , 80 g (0.8 g/kg) Protein  Carbohydrate:  At Least 130 g/day  Fluids: 2000 mL/day     Pt expected to meet estimated nutrient needs: []Yes [x]No (Currently NPO)    NUTRITION DIAGNOSES:   Problem:  Inadequate protein-energy intake      Etiology: related to abdominal pain, nause and vomiting     Signs/Symptoms: as evidenced by 8% wt loss x 1 month      NUTRITION INTERVENTIONS:  Meals/Snacks: General/healthful diet   Supplements: Commercial supplement              GOAL:   Diet advanced next 24 hours; PO intake >50% of meals/snacks next 2-4 days    NUTRITION MONITORING AND EVALUATION   Behavioral-Environmental Outcomes: Behavior  Food/Nutrient Intake Outcomes:  Total energy intake  Physical Signs/Symptoms Outcomes: Weight/weight change, Electrolyte and renal profile, Glucose profile, GI profile    Previous Goal Met:   [x] Met              [] Progressing Towards Goal              [] Not Progressing Towards Goal   Previous Recommendations:   [x] Implemented          [] Not Implemented          [] Not Applicable    LEARNING NEEDS (Diet, Food/Nutrient-Drug Interaction):    [x] None Identified   [] Identified and Education Provided/Documented   [] Identified and Pt declined/was not appropriate     Cultural, Pentecostal, OR Ethnic Dietary Needs:    [x] None Identified   [] Identified and Addressed     [x] Interdisciplinary Care Plan Reviewed/Documented    [x] Discharge Planning: Consistency per SLP recommendations; CCD + Preferred ONS   [x] Participated in Interdisciplinary Rounds    NUTRITION RISK:    [x] High              [] Moderate           []  Low  []  Minimal/Uncompromised      Rachid Millerney  Pager [phone]              Weekend Pager [phone]

## 2017-08-21 NOTE — PROGRESS NOTES
PCU SHIFT NURSING NOTE      Bedside and Verbal shift change report given to Mio Grant RN (oncoming nurse) by Randy Moya RN (offgoing nurse). Report included the following information SBAR, Kardex, ED Summary, Intake/Output, MAR, Recent Results and Cardiac Rhythm NSR. Shift Summary: 1864 0010: Pt made NPO for aspiration of hip this AM.    0725: Bedside and Verbal shift change report given to Randy Moya RN (oncoming nurse) by Mio Grant RN (offgoing nurse). Report included the following information SBAR, Kardex, ED Summary, Intake/Output, MAR, Recent Results and Cardiac Rhythm NSR. Admission Date 8/17/2017   Admission Diagnosis Abdominal pain  Elevated troponin   Consults IP CONSULT TO CARDIOLOGY  IP CONSULT TO ORTHOPEDIC SURGERY        Consults   []PT   []OT   []Speech   []Case Management      [] Palliative      Cardiac Monitoring Order   [x]Yes   []No     IV drips   []Yes    Drip:                            Dose:  Drip:                            Dose:  Drip:                            Dose:   [x]No     GI Prophylaxis   [x]Yes   []No         DVT Prophylaxis   SCDs:             Jesus stockings:         [x] Medication   []Contraindicated   []None      Activity Level Activity Level: Bed Rest     Activity Assistance: Partial (one person)   Purposeful Rounding every 1-2 hour? [x]Yes   Miller Score  Total Score: 4   Bed Alarm (If score 3 or >)   [x]Yes   [] Refused (See signed refusal form in chart)   Kwan Score  Kwan Score: 13   Kwan Score (if score 14 or less)   []PMT consult   []Wound Care consult      [x]Specialty bed   [] Nutrition consult          Needs prior to discharge:   Home O2 required:    []Yes   [x]No    If yes, how much O2 required?     Other:    Last Bowel Movement: Last Bowel Movement Date: 08/17/17      Influenza Vaccine Received Flu Vaccine for Current Season (usually Sept-March): Not Flu Season        Pneumonia Vaccine           Diet Active Orders   Diet    DIET DYSPHAGIA PUREED (NDD1)  1 NECTAR DIET NPO      LDAs           Triple Lumen 08/17/17 Right;Upper Subclavian (Active)   Central Line Being Utilized Yes 8/20/2017  7:54 AM   Criteria for Appropriate Use Limited/no vessel suitable for conventional peripheral access 8/20/2017  7:54 AM   Site Assessment Clean, dry, & intact 8/20/2017  7:54 AM   Infiltration Assessment 0 8/20/2017  7:54 AM   Affected Extremity/Extremities Color distal to insertion site pink (or appropriate for race) 8/20/2017  7:54 AM   Date of Last Dressing Change 08/17/17 8/20/2017  7:54 AM   Dressing Status Clean, dry, & intact 8/20/2017  7:54 AM   Dressing Type Tape;Transparent 8/20/2017  7:54 AM   Proximal Hub Color/Line Status White;Flushed 8/20/2017  7:54 AM   Positive Blood Return (Medial Site) Yes 8/20/2017  7:54 AM   Medial Hub Color/Line Status Blue;Flushed 8/20/2017  7:54 AM   Positive Blood Return (Lateral Site) Yes 8/20/2017  7:54 AM   Distal Hub Color/Line Status Brown; Infusing 8/20/2017  7:54 AM   Positive Blood Return (Site #3) Yes 8/20/2017  7:54 AM   External Catheter Length (cm) 5 centimeters 8/17/2017  3:01 PM   Alcohol Cap Used Yes 8/20/2017  7:54 AM                          Urinary Catheter      Intake & Output   Date 08/19/17 1900 - 08/20/17 0659 08/20/17 0700 - 08/21/17 0659   Shift 4883-9021 24 Hour Total 9678-0522 7097-5724 24 Hour Total   I  N  T  A  K  E   P.O.   240  240      P. O.   240  240    I.V. 1700 1700 600  600      Volume (cefepime (MAXIPIME) 2 g in 0.9% sodium chloride (MBP/ADV) 100 mL) 300 300 100  100      Volume (vancomycin (VANCOCIN) 1500 mg in  ml infusion) 1000 1000         Volume (potassium chloride 10 mEq in 100 ml IVPB) 400 400         Volume (vancomycin (VANCOCIN) 1750 mg in  ml infusion)   500  500    Shift Total 1700 1700 840  840   O  U  T  P  U  T   Urine 900 900 400  400      Urine Voided 900 900 400  400      Urine Occurrence(s)  1 x       Shift Total 900 900 400  400    663 096 089   Weight (kg) Readmission Risk Assessment Tool Score High Risk            30       Total Score        3 Has Seen PCP in Last 6 Months (Yes=3, No=0)    2 . Living with Significant Other. Assisted Living. LTAC. SNF. or   Rehab    4 IP Visits Last 12 Months (1-3=4, 4=9, >4=11)    5 Pt.  Coverage (Medicare=5 , Medicaid, or Self-Pay=4)    16 Charlson Comorbidity Score (Age + Comorbid Conditions)        Criteria that do not apply:    Patient Length of Stay (>5 days = 3)       Expected Length of Stay 1d 21h   Actual Length of Stay 3

## 2017-08-21 NOTE — PROGRESS NOTES
Aspiration of left hip performed by IR today. Will follow up on results. Further recommendations to follow results of aspiration.

## 2017-08-21 NOTE — PROGRESS NOTES
Speech path   We have a consult on this pt but he is NPO for L hip aspiration today. We will follow up later today if he is alert.    Gareth Mejia, SLP

## 2017-08-21 NOTE — PROGRESS NOTES
0742 Bedside report received from Mavis Garibay. Patient in bed. Repositioned. Call bell within reach and bed alarm in place for patient safety. 1055 Patient feeling anxious. Medicated with 5mg po valium as ordered. 1145 Patient resting at this time. 1220 Interdisciplinary rounds done at patients bedside. Plan of care discussed. Denise Wan NP, pharmacy, , dietician, and physical therapy in attendance at rounds. [phone] Patient complaining of pain, when asked where, he states everywhere. Medicated with 0.5mg IV Dilaudid as ordered for patient comfort. 1316 Patient off unit to radiology via bed with patient transport for left hip aspiration to rule out septic hip. 1455 Patient back from radiology. Repositioned in bed.   1527 Patient medicated with 0.5mg IV dilaudid for patient comfort. Family at bedside. 1615 Patient having some occasional PVCs. Notified Denise Wan NP and received order for potassium replacement for potassium level of 3.2. Also reordered patient diet. 1907 Bedside report given to Lakeisha Ireland RN. Patient repositioned in bed per patient request. Medicated with 0.5mg IV dilaudid for patient comfort. Call bell with in reach.

## 2017-08-21 NOTE — CONSULTS
CC: Left hip pain    HPI: 80 y.o. male with history of chronic left hip pain. He is a poor historian. He has been mostly bedridden for the past 3 months. He has had no fevers or chills. No falls precipitating the pain. He was scheduled for a Total hip arthroplasty by Dr. Rachel Hernadez in the past, but this was cancelled for personal reasons. He was admitted for abdominal pain, CP and elevated troponins. ROS:  Positive per HPI, otherwise negative. PMHX:  Past Medical History:   Diagnosis Date    AAA [facility]abdominal aortic aneurysm) ([facility]     Asthma     CAD (coronary artery disease)     Mitzy MPI 7/14 LVEF 45, chronic inf-lat ischemia; Echo 7/14 LVEF 45, mod LVH, mild LAE, mild AI/MR    Diabetic polyneuropathy (HCC)     DJD (degenerative joint disease)     DM II (diabetes mellitus, type II), controlled ([facility]     Dyslipidemia     GERD (gastroesophageal reflux disease)     History of left bundle branch block (LBBB)     HTN (hypertension)     Myocardial infarction ([facility]     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    Pneumonia     Prostate CA ([facility] 2012    s/p XRT, hormonal       PSHX  Past Surgical History:   Procedure Laterality Date    HX APPENDECTOMY      HX HEMORRHOIDECTOMY      HX PTCA      x 2 stents, vessel unknown, last 10 yrs ago       ALLERGIES:  Allergies   Allergen Reactions    Contrast Agent [Iodine] Hives    Levaquin [Levofloxacin] Unknown (comments)    Sulfa (Sulfonamide Antibiotics) Unknown (comments)       MEDS  No current facility-administered medications on file prior to encounter. Current Outpatient Prescriptions on File Prior to Encounter   Medication Sig Dispense Refill    DULoxetine (CYMBALTA) 30 mg capsule Take 1 Cap by mouth daily. 1 Cap 0    gabapentin (NEURONTIN) 600 mg tablet Take 1 Tab by mouth four (4) times daily. 1 Tab 0    glimepiride (AMARYL) 1 mg tablet Take 1 Tab by mouth every morning. 1 Tab 0    potassium chloride SR (KLOR-CON 10) 10 mEq tablet Take 1 Tab by mouth daily.  30 Tab 0    isosorbide mononitrate ER (IMDUR) 60 mg CR tablet Take 1 Tab by mouth every morning. 30 Tab 0    atorvastatin (LIPITOR) 10 mg tablet Take 1 Tab by mouth daily. 30 Tab 0    levothyroxine (SYNTHROID) 50 mcg tablet Take  by mouth Daily (before breakfast).  metFORMIN (GLUCOPHAGE) 500 mg tablet Take  by mouth two (2) times daily (with meals).  fluticasone-vilanterol (BREO ELLIPTA) 100-25 mcg/dose inhaler Take 1 Puff by inhalation daily.  carvedilol (COREG) 3.125 mg tablet Take  by mouth two (2) times daily (with meals).  montelukast (SINGULAIR) 10 mg tablet Take 10 mg by mouth daily.  aspirin delayed-release 81 mg tablet Take  by mouth daily.  oxyCODONE-acetaminophen (PERCOCET) 5-325 mg per tablet Take 1 Tab by mouth every four (4) hours as needed for Pain.  predniSONE (STERAPRED DS) 10 mg dose pack 6 pills daily for 3 days, then 4 pills daily for 3 days, then 2 pills daily for 3 days, then 1 pill daily for 3 days 39 Tab 0    nitroglycerin (NITROSTAT) 0.4 mg SL tablet 1 Tab by SubLINGual route as needed for Chest Pain. 1 Tab 0    oxyCODONE-acetaminophen (PERCOCET) 7.5-325 mg per tablet Take 1 Tab by mouth Before breakfast, lunch, dinner and at bedtime. Max Daily Amount: 4 Tabs. 120 Tab 0    oxyCODONE IR (OXY-IR) 15 mg immediate release tablet Take 1 Tab by mouth every four (4) hours as needed for Pain. Max Daily Amount: 90 mg. 30 Tab 0    diazePAM (VALIUM) 5 mg tablet Take 1 Tab by mouth every twelve (12) hours as needed for Anxiety (muscle spasm). Max Daily Amount: 10 mg. 30 Tab 0    tamsulosin (FLOMAX) 0.4 mg capsule Take 0.8 mg by mouth daily.  diclofenac EC (VOLTAREN) 50 mg EC tablet Take  by mouth two (2) times a day. SOC HX  Social History     Social History    Marital status:      Spouse name: N/A    Number of children: N/A    Years of education: N/A     Occupational History    Not on file.      Social History Main Topics    Smoking status: Former Smoker     Types: Cigarettes     Quit date: 1997    Smokeless tobacco: Never Used    Alcohol use No    Drug use: Not on file    Sexual activity: Not on file     Other Topics Concern    Not on file     Social History Narrative       PE:  Visit Vitals    BP (!) 135/91    Pulse 85    Temp 97.9 °F (36.6 °C)    Resp 18    SpO2 92%       A&Ox3  Normocephalic, atraumatic  Trachea midline  No audible wheezes  NRRR  Abdomen soft/NT    LLE:  No pain with log roll  No irritability of left hip  Pain with large arc ROM  - Motor: +EHL/FHL/TA/GSC/PL/PB  - SILT DP/MP/LP/MP/SURAL/SAPHENOUS  - Vascular: + DP, CR < 2 sec      IMAGIN views left hip - severe degenerative changes; advanced from previous flims dated 2017     A/P: Severe left hip arthritis  - patient is not a surgical candidate for KRISTINA given his current medical comorbidities  - NSAIDs not an option due to severe gastritis  - Will check esr and crp due to advancement.  If elevated, which likely will be the case, will have IR aspirate left hip joint to rule out septic arthritis  - further recs to follow

## 2017-08-22 LAB
ANION GAP SERPL CALC-SCNC: 2 MMOL/L (ref 5–15)
ATRIAL RATE: 88 BPM
BUN SERPL-MCNC: 13 MG/DL (ref 6–20)
BUN/CREAT SERPL: 28 (ref 12–20)
CALCIUM SERPL-MCNC: 7.4 MG/DL (ref 8.5–10.1)
CALCULATED P AXIS, ECG09: 55 DEGREES
CALCULATED R AXIS, ECG10: 22 DEGREES
CALCULATED T AXIS, ECG11: -163 DEGREES
CHLORIDE SERPL-SCNC: 105 MMOL/L (ref 97–108)
CO2 SERPL-SCNC: 32 MMOL/L (ref 21–32)
CREAT SERPL-MCNC: 0.46 MG/DL (ref 0.7–1.3)
DIAGNOSIS, 93000: NORMAL
ERYTHROCYTE [DISTWIDTH] IN BLOOD BY AUTOMATED COUNT: 14.5 % (ref 11.5–14.5)
GLUCOSE BLD STRIP.AUTO-MCNC: 112 MG/DL (ref 65–100)
GLUCOSE BLD STRIP.AUTO-MCNC: 134 MG/DL (ref 65–100)
GLUCOSE BLD STRIP.AUTO-MCNC: 136 MG/DL (ref 65–100)
GLUCOSE BLD STRIP.AUTO-MCNC: 149 MG/DL (ref 65–100)
GLUCOSE SERPL-MCNC: 106 MG/DL (ref 65–100)
HCT VFR BLD AUTO: 26.7 % (ref 36.6–50.3)
HGB BLD-MCNC: 8.5 G/DL (ref 12.1–17)
MAGNESIUM SERPL-MCNC: 1.3 MG/DL (ref 1.6–2.4)
MCH RBC QN AUTO: 31.7 PG (ref 26–34)
MCHC RBC AUTO-ENTMCNC: 31.8 G/DL (ref 30–36.5)
MCV RBC AUTO: 99.6 FL (ref 80–99)
P-R INTERVAL, ECG05: 186 MS
PLATELET # BLD AUTO: 207 K/UL (ref 150–400)
POTASSIUM SERPL-SCNC: 3.4 MMOL/L (ref 3.5–5.1)
Q-T INTERVAL, ECG07: 432 MS
QRS DURATION, ECG06: 100 MS
QTC CALCULATION (BEZET), ECG08: 522 MS
RBC # BLD AUTO: 2.68 M/UL (ref 4.1–5.7)
SERVICE CMNT-IMP: ABNORMAL
SODIUM SERPL-SCNC: 139 MMOL/L (ref 136–145)
VENTRICULAR RATE, ECG03: 88 BPM
WBC # BLD AUTO: 12.6 K/UL (ref 4.1–11.1)

## 2017-08-22 PROCEDURE — 74011250637 HC RX REV CODE- 250/637: Performed by: INTERNAL MEDICINE

## 2017-08-22 PROCEDURE — 74011250636 HC RX REV CODE- 250/636: Performed by: INTERNAL MEDICINE

## 2017-08-22 PROCEDURE — 82962 GLUCOSE BLOOD TEST: CPT

## 2017-08-22 PROCEDURE — C9113 INJ PANTOPRAZOLE SODIUM, VIA: HCPCS | Performed by: INTERNAL MEDICINE

## 2017-08-22 PROCEDURE — 74011000250 HC RX REV CODE- 250: Performed by: INTERNAL MEDICINE

## 2017-08-22 PROCEDURE — 92610 EVALUATE SWALLOWING FUNCTION: CPT

## 2017-08-22 PROCEDURE — 65660000000 HC RM CCU STEPDOWN

## 2017-08-22 PROCEDURE — 76450000000

## 2017-08-22 PROCEDURE — 74011000258 HC RX REV CODE- 258: Performed by: NURSE PRACTITIONER

## 2017-08-22 PROCEDURE — 74011250636 HC RX REV CODE- 250/636: Performed by: NURSE PRACTITIONER

## 2017-08-22 PROCEDURE — 74011250637 HC RX REV CODE- 250/637: Performed by: NURSE PRACTITIONER

## 2017-08-22 PROCEDURE — 80048 BASIC METABOLIC PNL TOTAL CA: CPT | Performed by: NURSE PRACTITIONER

## 2017-08-22 PROCEDURE — 36415 COLL VENOUS BLD VENIPUNCTURE: CPT | Performed by: NURSE PRACTITIONER

## 2017-08-22 PROCEDURE — 83735 ASSAY OF MAGNESIUM: CPT | Performed by: NURSE PRACTITIONER

## 2017-08-22 PROCEDURE — 85027 COMPLETE CBC AUTOMATED: CPT | Performed by: NURSE PRACTITIONER

## 2017-08-22 PROCEDURE — 80202 ASSAY OF VANCOMYCIN: CPT | Performed by: INTERNAL MEDICINE

## 2017-08-22 RX ORDER — MAGNESIUM SULFATE HEPTAHYDRATE 40 MG/ML
2 INJECTION, SOLUTION INTRAVENOUS ONCE
Status: COMPLETED | OUTPATIENT
Start: 2017-08-22 | End: 2017-08-22

## 2017-08-22 RX ORDER — MAGNESIUM SULFATE 1 G/100ML
1 INJECTION INTRAVENOUS ONCE
Status: COMPLETED | OUTPATIENT
Start: 2017-08-22 | End: 2017-08-22

## 2017-08-22 RX ORDER — LABETALOL HYDROCHLORIDE 5 MG/ML
10 INJECTION, SOLUTION INTRAVENOUS
Status: DISCONTINUED | OUTPATIENT
Start: 2017-08-22 | End: 2017-08-30 | Stop reason: HOSPADM

## 2017-08-22 RX ORDER — POTASSIUM CHLORIDE 1.5 G/1.77G
20 POWDER, FOR SOLUTION ORAL
Status: COMPLETED | OUTPATIENT
Start: 2017-08-22 | End: 2017-08-22

## 2017-08-22 RX ADMIN — TAMSULOSIN HYDROCHLORIDE 0.8 MG: 0.4 CAPSULE ORAL at 11:32

## 2017-08-22 RX ADMIN — CARVEDILOL 3.12 MG: 3.12 TABLET, FILM COATED ORAL at 08:56

## 2017-08-22 RX ADMIN — LIDOCAINE HYDROCHLORIDE 5 ML: 20 SOLUTION ORAL; TOPICAL at 11:44

## 2017-08-22 RX ADMIN — ATORVASTATIN CALCIUM 10 MG: 10 TABLET, FILM COATED ORAL at 10:11

## 2017-08-22 RX ADMIN — RANOLAZINE 500 MG: 500 TABLET, FILM COATED, EXTENDED RELEASE ORAL at 08:56

## 2017-08-22 RX ADMIN — CEFEPIME HYDROCHLORIDE 2 G: 2 INJECTION, POWDER, FOR SOLUTION INTRAVENOUS at 17:30

## 2017-08-22 RX ADMIN — HYDROMORPHONE HYDROCHLORIDE 0.5 MG: 1 INJECTION, SOLUTION INTRAMUSCULAR; INTRAVENOUS; SUBCUTANEOUS at 00:07

## 2017-08-22 RX ADMIN — GABAPENTIN 600 MG: 300 CAPSULE ORAL at 17:30

## 2017-08-22 RX ADMIN — Medication 10 ML: at 22:32

## 2017-08-22 RX ADMIN — ASPIRIN 81 MG: 81 TABLET, COATED ORAL at 10:11

## 2017-08-22 RX ADMIN — POLYETHYLENE GLYCOL 3350 17 G: 17 POWDER, FOR SOLUTION ORAL at 10:12

## 2017-08-22 RX ADMIN — GABAPENTIN 600 MG: 300 CAPSULE ORAL at 22:31

## 2017-08-22 RX ADMIN — CASTOR OIL AND BALSAM, PERU: 788; 87 OINTMENT TOPICAL at 10:12

## 2017-08-22 RX ADMIN — CEFEPIME HYDROCHLORIDE 2 G: 2 INJECTION, POWDER, FOR SOLUTION INTRAVENOUS at 10:21

## 2017-08-22 RX ADMIN — MAGNESIUM SULFATE HEPTAHYDRATE 1 G: 1 INJECTION, SOLUTION INTRAVENOUS at 08:48

## 2017-08-22 RX ADMIN — MAGNESIUM SULFATE HEPTAHYDRATE 2 G: 40 INJECTION, SOLUTION INTRAVENOUS at 10:08

## 2017-08-22 RX ADMIN — HYDROMORPHONE HYDROCHLORIDE 0.5 MG: 1 INJECTION, SOLUTION INTRAMUSCULAR; INTRAVENOUS; SUBCUTANEOUS at 15:23

## 2017-08-22 RX ADMIN — LEVOTHYROXINE SODIUM 50 MCG: 50 TABLET ORAL at 08:56

## 2017-08-22 RX ADMIN — ISOSORBIDE MONONITRATE 30 MG: 30 TABLET, EXTENDED RELEASE ORAL at 10:10

## 2017-08-22 RX ADMIN — GABAPENTIN 600 MG: 300 CAPSULE ORAL at 11:31

## 2017-08-22 RX ADMIN — DOCUSATE SODIUM AND SENNOSIDES 1 TABLET: 8.6; 5 TABLET, FILM COATED ORAL at 17:30

## 2017-08-22 RX ADMIN — CEFEPIME HYDROCHLORIDE 2 G: 2 INJECTION, POWDER, FOR SOLUTION INTRAVENOUS at 03:51

## 2017-08-22 RX ADMIN — LIDOCAINE HYDROCHLORIDE 5 ML: 20 SOLUTION ORAL; TOPICAL at 17:31

## 2017-08-22 RX ADMIN — ACETAMINOPHEN 1000 MG: 500 TABLET, FILM COATED ORAL at 17:30

## 2017-08-22 RX ADMIN — Medication 10 ML: at 10:35

## 2017-08-22 RX ADMIN — SODIUM CHLORIDE 40 MG: 9 INJECTION INTRAMUSCULAR; INTRAVENOUS; SUBCUTANEOUS at 08:48

## 2017-08-22 RX ADMIN — ENOXAPARIN SODIUM 40 MG: 40 INJECTION SUBCUTANEOUS at 08:48

## 2017-08-22 RX ADMIN — Medication 1 CAPSULE: at 10:11

## 2017-08-22 RX ADMIN — Medication 10 ML: at 15:24

## 2017-08-22 RX ADMIN — VANCOMYCIN HYDROCHLORIDE 1750 MG: 10 INJECTION, POWDER, LYOPHILIZED, FOR SOLUTION INTRAVENOUS at 00:05

## 2017-08-22 RX ADMIN — SODIUM CHLORIDE 250 ML: 900 INJECTION, SOLUTION INTRAVENOUS at 11:11

## 2017-08-22 RX ADMIN — ACETAMINOPHEN 1000 MG: 500 TABLET, FILM COATED ORAL at 22:30

## 2017-08-22 RX ADMIN — Medication 10 ML: at 03:58

## 2017-08-22 RX ADMIN — DOCUSATE SODIUM AND SENNOSIDES 1 TABLET: 8.6; 5 TABLET, FILM COATED ORAL at 10:11

## 2017-08-22 RX ADMIN — LISINOPRIL 2.5 MG: 5 TABLET ORAL at 10:10

## 2017-08-22 RX ADMIN — ACETAMINOPHEN 1000 MG: 500 TABLET, FILM COATED ORAL at 08:56

## 2017-08-22 RX ADMIN — MONTELUKAST SODIUM 10 MG: 10 TABLET, FILM COATED ORAL at 10:10

## 2017-08-22 RX ADMIN — VANCOMYCIN HYDROCHLORIDE 1750 MG: 10 INJECTION, POWDER, LYOPHILIZED, FOR SOLUTION INTRAVENOUS at 12:19

## 2017-08-22 RX ADMIN — DULOXETINE HYDROCHLORIDE 30 MG: 30 CAPSULE, DELAYED RELEASE ORAL at 11:31

## 2017-08-22 RX ADMIN — FLUTICASONE FUROATE AND VILANTEROL TRIFENATATE 1 PUFF: 100; 25 POWDER RESPIRATORY (INHALATION) at 10:13

## 2017-08-22 RX ADMIN — LIDOCAINE HYDROCHLORIDE 5 ML: 20 SOLUTION ORAL; TOPICAL at 08:58

## 2017-08-22 RX ADMIN — POTASSIUM CHLORIDE 20 MEQ: 1.5 POWDER, FOR SOLUTION ORAL at 10:12

## 2017-08-22 NOTE — PROGRESS NOTES
Speech Pathology bedside swallow evaluation/discharge  Patient: Helder Monroy (16 y.o. male)  Date: 8/22/2017  Primary Diagnosis: Abdominal pain  Elevated troponin        Precautions:        ASSESSMENT :  Based on the objective data described below, the patient presents with functional swallow of all textures. He did not have his dentures so he was very slow to masticate the jada cracker. Skilled therapy provided by a speech-language pathologist is not indicated at this time. PLAN :  Recommendations:  [facility] soft diet/thins  When his dentures arrive, he may be upgraded to regular  Discharge Recommendations: To Be Determined     SUBJECTIVE:   Patient stated .     OBJECTIVE:     Past Medical History:   Diagnosis Date    AAA [facility]abdominal aortic aneurysm) [facility]     Asthma     CAD (coronary artery disease)     Mitzy MPI 7/14 LVEF 45, chronic inf-lat ischemia; Echo 7/14 LVEF 45, mod LVH, mild LAE, mild AI/MR    Diabetic polyneuropathy (HCC)     DJD (degenerative joint disease)     DM II (diabetes mellitus, type II), controlled ([address]     Dyslipidemia     GERD (gastroesophageal reflux disease)     History of left bundle branch block (LBBB)     HTN (hypertension)     Myocardial infarction ([facility] [address]     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    Pneumonia     Prostate CA ([facility] [address] 2012    s/p XRT, hormonal     Past Surgical History:   Procedure Laterality Date    HX APPENDECTOMY      HX HEMORRHOIDECTOMY      HX PTCA      x 2 stents, vessel unknown, last 10 yrs ago     Prior Level of Function/Home Situation:   Home Situation  Home Environment: Private residence  One/Two Story Residence: One story  Living Alone: No  Support Systems: Family member(s), Child(alen), Spouse/Significant Other/Partner  Patient Expects to be Discharged to[de-identified] Private residence  Current DME Used/Available at Home: [employer], straight  Diet prior to admission: reg/thins  Current Diet: purees /nectar thick liquids  Cognitive and Communication Status:  Neurologic State: Alert  Orientation Level: Oriented X4  Cognition: Appropriate decision making, Appropriate for age attention/concentration, Appropriate safety awareness  Perception: Appears intact  Perseveration: No perseveration noted     Oral Assessment:  Oral Assessment  Labial: No impairment  Dentition: Upper dentures;Partials (comment)  Lingual: No impairment  Mandible: No impairment  P.O. Trials:  Patient Position: upright in bed  Vocal quality prior to P.O.: No impairment  Consistency Presented: Thin liquid; Solid;Puree  How Presented: Self-fed/presented     Bolus Acceptance: No impairment  Bolus Formation/Control: Impaired  Type of Impairment: Delayed;Mastication  Propulsion: Delayed (# of seconds)  Oral Residue: None  Initiation of Swallow: No impairment  Laryngeal Elevation: Functional  Aspiration Signs/Symptoms: None  Pharyngeal Phase Characteristics: No impairment, issues, or problems              Oral Phase Severity: Mild  Pharyngeal Phase Severity : No impairment  NOMS:   The NOMS functional outcome measure was used to quantify this patient's level of swallowing impairment. Based on the NOMS, the patient was determined to be at level 6 for swallow function     G Codes: In compliance with CMSs Claims Based Outcome Reporting, the following G-code set was chosen for this patient based the use of the NOMS functional outcome to quantify this patient's level of swallowing impairment. Using the NOMS, the patient was determined to be at level 6 for swallow function which correlates with the CI=1-19% level of severity.     Based on the objective assessment provided within this note, the current, goal, and discharge g-codes are as follows:    Swallow  Swallowing:   Swallow Current Status CI= 1-19%   Swallow Goal Status CI= 1-19%   Swallow D/C Status CI= 1-19%        NOMS Swallowing Levels:  Level 1 (CN): NPO  Level 2 (CM): NPO but takes consistency in therapy  Level 3 (CL): Takes less than 50% of nutrition p.o. and continues with nonoral feedings; and/or safe with mod cues; and/or max diet restriction  Level 4 (CK): Safe swallow but needs mod cues; and/or mod diet restriction; and/or still requires some nonoral feeding/supplements  Level 5 (CJ): Safe swallow with min diet restriction; and/or needs min cues  Level 6 (CI): Independent with p.o.; rare cues; usually self cues; may need to avoid some foods or needs extra time  Level 7 ([address] [place] [address]): Independent for all p.o.  ADRIÁN. (2003). National Outcomes Measurement System (NOMS): Adult Speech-Language Pathology User's Guide. Pain:Pain Scale 1: Numeric (0 - 10)  Pain Intensity 1: 0  Pain Location 1: Hip  After treatment:   [] Patient left in no apparent distress sitting up in chair  [] Patient left in no apparent distress in bed  [] Call bell left within reach  [] Nursing notified  [] Caregiver present  [] Bed alarm activated    COMMUNICATION/EDUCATION:   The patients plan of care including findings, recommendations, and recommended diet changes were discussed with: Registered Nurse. Pt was instructed to eat sitting fully upright as possible. [] Posted safety precautions in patient's room. [x] Patient/family have participated as able and agree with findings and recommendations. [] Patient is unable to participate in plan of care at this time.     Thank you for this referral.  Josue Lopez SLP  Time Calculation: 10 mins

## 2017-08-22 NOTE — PROGRESS NOTES
Hospitalist Progress Note    NAME: Chirs Napier   :  1932   MRN:  [id number]       Interim Hospital Summary: 80 y.o. male whom presented on 2017 with      Assessment / Plan:  HCAP  - CXR revaled Left upper lobe atelectasis/consolidation. - continue with IV Cefepime and Vanc. Pt developed severe allergic reaction after taking levofloxacin which resulted severe canker sores. - O2 Sat 96% at 2L of O2 via n/c; wean O2 as tolerate  Canker sores  - magic mouth wash; asked nursing staff to help with oral care using sponges or swish and spit when pt is able. Much improved  Chest Pain  Elevated Troponin  History of CAD s/p remote PCI  Stress Test negative on 17  Hyperlipidemia'  Chronic Systolic Heart Failure: (may be end stage Cardiomyopathy)  - discussed with the family and pt yesterday in regards of code status and goals of care. Patient and the family requested continue with current care, do not wish to have any invasive procedures.  Pt and the family asked for optimal pain control. Will consult palliative team to follow. - no further chest pain since we added Ranexa   - EKG with TWI in V1-V5  - troponin went up to 0.55 from 0.32  - cardiology following; communicated with cardiology team on pt's wish. Conservative medical management. Cardiology team will follow as need basis  - Echo: Ejection fraction was estimated in the range of 15 % to 20 %  - continue ASA, BB, Ranexa, & IMDUR  - continue statin  Hypotension: hold Coreg, ACE, give IVF bolus, monitor. Abdominal Pain: now resolved  Nausea/vomiting  - CT Abdomen/Pelvis: Distended gallbladder. Infrarenal abdominal aortic aneurysm measures 5.3 x 5 cm (no changes from previous images), Colonic diverticulosis. - Abdominal ultrasound:Sludge layers dependently in the nondistended gallbladder.  No wall thickening or pericholecystic fluid.    - Avoid NSAID  - IV PPI & IV antiemetics  - stop diclofenac  Chronic Impacted Left Femoral Head Fracture: ideally needs total hip replacement to control pain  - D/c  fentanyl patch  May be contributing to hypotension and excessive sedation  - scheduled Acetaminophen  - prn Oxycodone or dilaudid, valium as need for severe muscle spasm  - Dr. Avinash Jordan spoke with Dr. Kelsey Prater in Radiology to get addendum added on to patient CT A/P which was preformed at [facility] detailing patient's left hip pathology  - ortho following; pain management. IR for aspiration to r/o septic joint  Sacral Decub:  - consult wound care; STAGE 4 decubitus ulcer  Type 2 diabetes mellitus without complication, without long-term current use of insulin   Diabetic polyneuropathy  - hold home oral antihyperglycemics and place on SSI  - Hgb A1c 6.6  - continue home Gabapentin  Abdominal aortic aneurysm (AAA)   - stable on today's CT:Infrarenal abdominal aortic aneurysm measures 5.3 x 5 cm  Obesity  Severe Protein Calorie Malnutrition  -consult Nutrition  Hypokalemia: replace and monitor. Hypomagnesemia: replace and monitor. Code Status: DNR   DVT Prophylaxis: Lovenox  GI Prophylaxis: PPI as above   Baseline: Bed bound for last 3 months per patient report  Recommended Disposition:SNF      Subjective:     Chief Complaint / Reason for Physician Visit  Pt slept through the exam.  Discussed with RN events overnight. Review of Systems:  Symptom Y/N Comments  Symptom Y/N Comments   Fever/Chills    Chest Pain     Poor Appetite    Edema y    Cough    Abdominal Pain     Sputum    Joint Pain     SOB/LEWIS    Pruritis/Rash     Nausea/vomit    Tolerating PT/OT     Diarrhea    Tolerating Diet y    Constipation    Other       Could NOT obtain due to:      Objective:     VITALS:   Last 24hrs VS reviewed since prior progress note.  Most recent are:  Patient Vitals for the past 24 hrs:   Temp Pulse Resp BP SpO2   08/22/17 1153 - - - 96/52 -   08/22/17 1142 - 75 - 110/56 -   08/22/17 1133 - 74 - 100/51 -   08/22/17 1129 - 75 - 94/52 -   08/22/17 1121 97.4 °F (36.3 °C) 79 20 93/50 99 %   08/22/17 1107 - - - 92/50 100 %   08/22/17 1100 - - - (!) 87/44 98 %   08/22/17 0856 - 81 - 132/71 -   08/22/17 0735 96.5 °F (35.8 °C) 80 19 121/78 97 %   08/22/17 0344 - 79 20 113/59 98 %   08/21/17 2321 97.4 °F (36.3 °C) 82 20 107/60 96 %   08/21/17 1917 97.2 °F (36.2 °C) 84 20 104/58 98 %   08/21/17 1610 97.4 °F (36.3 °C) 89 20 120/79 97 %       Intake/Output Summary (Last 24 hours) at 08/22/17 1231  Last data filed at 08/22/17 1129   Gross per 24 hour   Intake             2180 ml   Output              800 ml   Net             1380 ml        PHYSICAL EXAM:  General: WD, WN. lethargic, no acute distress    EENT:  EOMI. Anicteric sclerae. MMM  Resp:  Coarse BS  CV:  Regular  rhythm,  No edema  GI:  Soft, obese, Non tender.  +Bowel sounds  Neurologic:  Opened eyes briefly during exam then fall asleep. normal speech  Psych:   Fair insight. Not anxious nor agitated  Skin:  No rashes. No jaundice    Reviewed most current lab test results and cultures  YES  Reviewed most current radiology test results   YES  Review and summation of old records today    NO  Reviewed patient's current orders and MAR    YES  PMH/SH reviewed - no change compared to H&P  ________________________________________________________________________  Care Plan discussed with:    Comments   Patient y    Family      RN y    Care Manager y    Consultant  y Ortho                    y Multidiciplinary team rounds were held today with , nursing, pharmacist and clinical coordinator. Patient's plan of care was discussed; medications were reviewed and discharge planning was addressed.      ________________________________________________________________________  Total NON critical care TIME:  30   Minutes    Total CRITICAL CARE TIME Spent:   Minutes non procedure based      Comments   >50% of visit spent in counseling and coordination of care     ________________________________________________________________________  Mel Perdomo MD     Procedures: see electronic medical records for all procedures/Xrays and details which were not copied into this note but were reviewed prior to creation of Plan. LABS:  I reviewed today's most current labs and imaging studies.   Pertinent labs include:  Recent Labs      08/22/17 0404 08/21/17 0323 08/20/17   0302   WBC  12.6*  13.8*  16.6*   HGB  8.5*  8.9*  9.8*   HCT  26.7*  27.6*  29.7*   PLT  207  218  230     Recent Labs      08/22/17 0404  08/21/17 0323  08/20/17   0302   NA  139  142  140   K  3.4*  3.2*  3.4*   CL  105  109*  107   CO2  32  29  28   GLU  106*  102*  130*   BUN  13  18  24*   CREA  0.46*  0.49*  0.55*   CA  7.4*  7.2*  7.3*   MG  1.3*   --    --        Signed: )Sharyle Kief, MD

## 2017-08-22 NOTE — PROGRESS NOTES
Consult received. We plan to visit w/ pt tomorrow, 08/23/2017. Full, initial consult note to follow. Should you have questions/concerns or the need for a bedside visit by our team, please do not hesitate to contact us at [phone] Thank you for providing us w/ the opportunity to be involved in this patient's care.       Nancy Renteria MD  Palliative Care Team

## 2017-08-22 NOTE — CDMP QUERY
Dr. Marline Adler  Please clarify if this patient is being treated/managed for:    =>possible aspiration pneumonia POA in setting of xray showing left upper lobe consolidation with N/V, \"HCAP' noted. =>Other Explanation of clinical findings  =>Unable to Determine (no explanation of clinical findings)    The medical record reflects the following clinical findings, treatment, and risk factors:    Risk Factors: 81 yo admitted with vomiting, abdominal pain   Clinical Indicators: wbc 13-16, admitting for vomiting with left upper lobe consolidation, HCAP noted, hypotension noted. Treatment: Maxipime, Vancomycin, Rocephin,     Please clarify and document your clinical opinion in the progress notes and discharge summary including the definitive and/or presumptive diagnosis, (suspected or probable), related to the above clinical findings. Please include clinical findings supporting your diagnosis. Thank Delvis Melendez RN [address] [place] [address]; [facility];[id number]

## 2017-08-22 NOTE — PROGRESS NOTES
PCU SHIFT NURSING NOTE      Bedside and Verbal shift change report given to Carlee Weiss (oncoming nurse) by Paz Scheuermann (offgoing nurse). Report included the following information SBAR, Kardex, Procedure Summary, Intake/Output, MAR and Recent Results. Shift Summary: [id number]: Patient's blood pressure dropped after patient bathed in bed by nursing staff. Patient has received several blood pressure medications. Patient symptomatic, pale and complaints of dizziness. MD paged  1100: 250 ml bolus of NS given per MD orders  1230: Fentanyl patch removed per MD orders, blood pressure medications adjusted by MD  1300: Lab called, MRSA positive from mouth culture sent. Dr. Amador Reilly made aware. Contact isolation started      Admission Date 8/17/2017   Admission Diagnosis Abdominal pain  Elevated troponin   Consults IP CONSULT TO CARDIOLOGY  IP CONSULT TO ORTHOPEDIC SURGERY  IP CONSULT TO PALLIATIVE CARE - PROVIDER        Consults   [x]PT   [x]OT   []Speech   []Case Management      [] Palliative      Cardiac Monitoring Order   [x]Yes   []No     IV drips   []Yes    Drip:                            Dose:  Drip:                            Dose:  Drip:                            Dose:   []No     GI Prophylaxis   []Yes   []No         DVT Prophylaxis   SCDs:             Jesus stockings:         [] Medication   []Contraindicated   []None      Activity Level Activity Level: Bed Rest     Activity Assistance: Partial (two people)   Purposeful Rounding every 1-2 hour? [x]Yes   Miller Score  Total Score: 3   Bed Alarm (If score 3 or >)   []Yes   [] Refused (See signed refusal form in chart)   Kwan Score  Kwan Score: 12   Kwan Score (if score 14 or less)   [x]PMT consult   [x]Wound Care consult      [x]Specialty bed   [x] Nutrition consult          Needs prior to discharge:   Home O2 required:    []Yes   [x]No    If yes, how much O2 required?     Other:    Last Bowel Movement: Last Bowel Movement Date: 08/20/17      Influenza Vaccine Received Flu Vaccine for Current Season (usually Sept-March): Not Flu Season        Pneumonia Vaccine           Diet Active Orders   Diet    DIET DYSPHAGIA ADVANCED SOFT (NDD3)      LDAs           Triple Lumen 08/17/17 Right;Upper Subclavian (Active)   Central Line Being Utilized Yes 8/21/2017  8:51 PM   Criteria for Appropriate Use Limited/no vessel suitable for conventional peripheral access 8/21/2017  8:51 PM   Site Assessment Clean, dry, & intact 8/21/2017  8:51 PM   Infiltration Assessment 0 8/21/2017  8:51 PM   Affected Extremity/Extremities Color distal to insertion site pink (or appropriate for race) 8/21/2017  8:51 PM   Date of Last Dressing Change 08/17/17 8/21/2017  7:16 AM   Dressing Status Clean, dry, & intact 8/21/2017  8:51 PM   Dressing Type Disk with Chlorhexadine gluconate (CHG) 8/21/2017  8:51 PM   Action Taken Blood drawn 8/21/2017  7:16 AM   Proximal Hub Color/Line Status White;Capped 8/21/2017  8:51 PM   Positive Blood Return (Medial Site) Yes 8/21/2017  8:51 PM   Medial Hub Color/Line Status Blue;Capped 8/21/2017  8:51 PM   Positive Blood Return (Lateral Site) Yes 8/21/2017  8:51 PM   Distal Hub Color/Line Status Catherne Stairs; Infusing 8/21/2017  8:51 PM   Positive Blood Return (Site #3) Yes 8/21/2017  8:51 PM   External Catheter Length (cm) 5 centimeters 8/17/2017  3:01 PM   Alcohol Cap Used Yes 8/21/2017  7:16 AM              Condom Catheter 08/20/17 (Active)   Criteria for Appropriate Use Strict I/Os 8/21/2017  7:16 AM   Status Draining 8/21/2017  7:16 AM   Site Condition No abnormalities 8/21/2017  7:16 AM   Drainage Tube Clipped to Bed Yes 8/21/2017  7:16 AM   Catheter Secured to Thigh No 8/21/2017  7:16 AM   Tamper Seal Intact No 8/21/2017  7:16 AM   Bag Below Bladder/Not on Floor Yes 8/21/2017  7:16 AM   Lack of Dependent Loop in Tubing Yes 8/21/2017  7:16 AM   Drainage Bag Less Than Half Full Yes 8/21/2017  7:16 AM   Sterile Solution Used for  Irrigation N/A 8/21/2017  7:16 AM   Urine Output (mL) 250 ml 8/21/2017  6:09 AM                Urinary Catheter Condom Catheter 08/20/17-Criteria for Appropriate Use: Strict I/Os    Intake & Output   Date 08/21/17 0700 - 08/22/17 0659 08/22/17 0700 - 08/23/17 0659   Shift 4922-2222 4200-3141 24 Hour Total 0700-1859 1900-0659 24 Hour Total   I  N  T  A  K  E   P.O. 240  240 240  240      P. O. 240  240 240  240    I.V.  (mL/kg/hr)  1200  (1) 1200  (0.5)         Volume (cefepime (MAXIPIME) 2 g in 0.9% sodium chloride (MBP/ADV) 100 mL)  300 300         Volume (vancomycin (VANCOCIN) 1750 mg in  ml infusion)  500 500         Volume (potassium chloride 10 mEq in 100 ml IVPB)  400 400       Shift Total  (mL/kg) 240  (2.3) 1200  (11.6) 1440  (13.9) 240  (2.3)  240  (2.3)   O  U  T  P  U  T   Urine  (mL/kg/hr)    800  800      Urine Output (mL) (Condom Catheter 08/20/17)    800  800    Shift Total  (mL/kg)    800  (7.7)  800  (7.7)    1200 1440 -560  -560   Weight (kg) 103.2 103.2 103.2 103.2 103.2 103.2         Readmission Risk Assessment Tool Score High Risk            30       Total Score        3 Has Seen PCP in Last 6 Months (Yes=3, No=0)    2 . Living with Significant Other. Assisted Living. LTAC. SNF. or   Rehab    4 IP Visits Last 12 Months (1-3=4, 4=9, >4=11)    5 Pt.  Coverage (Medicare=5 , Medicaid, or Self-Pay=4)    16 Charlson Comorbidity Score (Age + Comorbid Conditions)        Criteria that do not apply:    Patient Length of Stay (>5 days = 3)       Expected Length of Stay 1d 21h   Actual Length of Stay 5

## 2017-08-22 NOTE — PROGRESS NOTES
1910: Bedside report received from Joy Milner. Patient complaining of pain and medicated with dilaudid as ordered. Vitals stable. Will monitor.

## 2017-08-22 NOTE — PROGRESS NOTES
8/22/2017 1:26 PM    Admit Date: 8/17/2017    Admit Diagnosis: Abdominal pain;Elevated troponin    Subjective:     No evens. Weekend events noted.     Visit Vitals    BP 96/52    Pulse 75    Temp 97.4 °F (36.3 °C)    Resp 20    Wt 227 lb 9.6 oz (103.2 kg)    SpO2 99%    BMI 31.74 kg/m2     Current Facility-Administered Medications   Medication Dose Route Frequency    VANCOMYCIN INFORMATION NOTE   Other ONCE    labetalol (NORMODYNE;TRANDATE) injection 10 mg  10 mg IntraVENous Q6H PRN    lactobac ac& pc-s.therm-b.anim (DULCE Q/RISAQUAD)  1 Cap Oral DAILY    ranolazine ER (RANEXA) tablet 500 mg  500 mg Oral BID    vancomycin (VANCOCIN) 1750 mg in  ml infusion  1,750 mg IntraVENous Q12H    diazePAM (VALIUM) tablet 5 mg  5 mg Oral Q8H PRN    HYDROmorphone (PF) (DILAUDID) injection 0.5 mg  0.5 mg IntraVENous Q4H PRN    cefepime (MAXIPIME) 2 g in 0.9% sodium chloride (MBP/ADV) 100 mL  2 g IntraVENous Q8H    balsam peru-castor oil (VENELEX)  mg/gram ointment   Topical DAILY    magic mouthwash cpd (with sucralfate)  5 mL Oral TIDAC    aspirin delayed-release tablet 81 mg  81 mg Oral DAILY    atorvastatin (LIPITOR) tablet 10 mg  10 mg Oral DAILY    DULoxetine (CYMBALTA) capsule 30 mg  30 mg Oral DAILY    fluticasone-vilanterol (BREO ELLIPTA) 100mcg-25mcg/puff  1 Puff Inhalation DAILY    gabapentin (NEURONTIN) capsule 600 mg  600 mg Oral QID    isosorbide mononitrate ER (IMDUR) tablet 30 mg  30 mg Oral DAILY    levothyroxine (SYNTHROID) tablet 50 mcg  50 mcg Oral ACB    montelukast (SINGULAIR) tablet 10 mg  10 mg Oral DAILY    nitroglycerin (NITROSTAT) tablet 0.4 mg  0.4 mg SubLINGual PRN    tamsulosin (FLOMAX) capsule 0.8 mg  0.8 mg Oral DAILY    pantoprazole (PROTONIX) 40 mg in sodium chloride 0.9 % 10 mL injection  40 mg IntraVENous DAILY    ondansetron (ZOFRAN) injection 4 mg  4 mg IntraVENous Q4H PRN    sodium chloride (NS) flush 5-10 mL  5-10 mL IntraVENous Q8H  sodium chloride (NS) flush 5-10 mL  5-10 mL IntraVENous PRN    acetaminophen (TYLENOL) tablet 1,000 mg  1,000 mg Oral TID    oxyCODONE IR (ROXICODONE) tablet 10 mg  10 mg Oral Q4H PRN    naloxone (NARCAN) injection 0.4 mg  0.4 mg IntraVENous PRN    enoxaparin (LOVENOX) injection 40 mg  40 mg SubCUTAneous DAILY    insulin lispro (HUMALOG) injection   SubCUTAneous AC&HS    glucose chewable tablet 16 g  4 Tab Oral PRN    glucagon (GLUCAGEN) injection 1 mg  1 mg IntraMUSCular PRN    dextrose 10% infusion 125-250 mL  125-250 mL IntraVENous PRN    senna-docusate (PERICOLACE) 8.6-50 mg per tablet 1 Tab  1 Tab Oral BID    polyethylene glycol (MIRALAX) packet 17 g  17 g Oral DAILY         Objective:      Visit Vitals    BP 96/52    Pulse 75    Temp 97.4 °F (36.3 °C)    Resp 20    Wt 227 lb 9.6 oz (103.2 kg)    SpO2 99%    BMI 31.74 kg/m2       Physical Exam:  Abdomen: soft, non-tender.  Bowel sounds normal.   Extremities: no cyanosis or edema  Heart: regular rate and rhythm, S1, S2 normal, no murmur, click, rub or gallop  Lungs: clear to auscultation bilaterally    Data Review:   Labs:    Recent Results (from the past 24 hour(s))   CULTURE, WOUND W GRAM STAIN    Collection Time: 08/21/17  2:10 PM   Result Value Ref Range    Special Requests: NO SPECIAL REQUESTS      GRAM STAIN NO WBC'S SEEN      GRAM STAIN NO ORGANISMS SEEN      Culture result: NO GROWTH AFTER 17 HOURS     CELL COUNT, BODY FLUID    Collection Time: 08/21/17  2:10 PM   Result Value Ref Range    BODY FLUID TYPE LEFT HIP      FLUID COLOR RED      FLUID APPEARANCE BLOODY      FLUID RBC COUNT >100 /cu mm    FLUID WBC COUNT 260 (H) 0 - 5 /cu mm    FLD NEUTROPHILS 89 %    FLD LYMPHS 8 %    FLD MONO/MACROPHAGE 1 %    FLD EOSINS 2 %   GLUCOSE, POC    Collection Time: 08/21/17  4:38 PM   Result Value Ref Range    Glucose (POC) 125 (H) 65 - 100 mg/dL    Performed by Lennon Halsted, POC    Collection Time: 08/21/17  9:09 PM   Result Value Ref Range    Glucose (POC) 129 (H) 65 - 100 mg/dL    Performed by Susan Brito    METABOLIC PANEL, BASIC    Collection Time: 08/22/17  4:04 AM   Result Value Ref Range    Sodium 139 136 - 145 mmol/L    Potassium 3.4 (L) 3.5 - 5.1 mmol/L    Chloride 105 97 - 108 mmol/L    CO2 32 21 - 32 mmol/L    Anion gap 2 (L) 5 - 15 mmol/L    Glucose 106 (H) 65 - 100 mg/dL    BUN 13 6 - 20 MG/DL    Creatinine 0.46 (L) 0.70 - 1.30 MG/DL    BUN/Creatinine ratio 28 (H) 12 - 20      GFR est AA >60 >60 ml/min/1.73m2    GFR est non-AA >60 >60 ml/min/1.73m2    Calcium 7.4 (L) 8.5 - 10.1 MG/DL   CBC W/O DIFF    Collection Time: 08/22/17  4:04 AM   Result Value Ref Range    WBC 12.6 (H) 4.1 - 11.1 K/uL    RBC 2.68 (L) 4.10 - 5.70 M/uL    HGB 8.5 (L) 12.1 - 17.0 g/dL    HCT 26.7 (L) 36.6 - 50.3 %    MCV 99.6 (H) 80.0 - 99.0 FL    MCH 31.7 26.0 - 34.0 PG    MCHC 31.8 30.0 - 36.5 g/dL    RDW 14.5 11.5 - 14.5 %    PLATELET 192 065 - 726 K/uL   MAGNESIUM    Collection Time: 08/22/17  4:04 AM   Result Value Ref Range    Magnesium 1.3 (L) 1.6 - 2.4 mg/dL   GLUCOSE, POC    Collection Time: 08/22/17  7:19 AM   Result Value Ref Range    Glucose (POC) 112 (H) 65 - 100 mg/dL    Performed by Denise Hyde (PCT)    GLUCOSE, POC    Collection Time: 08/22/17 11:28 AM   Result Value Ref Range    Glucose (POC) 149 (H) 65 - 100 mg/dL    Performed by Nelida Duncan        Telemetry: normal sinus rhythm      Assessment:     Active Problems:    Coronary artery disease involving native coronary artery of native heart without angina pectoris (7/14/2017)      Essential hypertension (7/14/2017)      Abdominal pain (8/17/2017)      Elevated troponin (8/17/2017)      CAP (community acquired pneumonia) (8/19/2017)        Plan:     No further cardiac work up as per family wishes. Continue current meds. Will follow as needed.

## 2017-08-22 NOTE — PROGRESS NOTES
Ortho:  Chronic left hip pain  No growth from hip aspirate at this time-  Will continue to follow cultures  No additional Ortho recommendations at this time    Will need out-pt F/U with Dr Darya Ramon in [facility]       Dr Kentrell Constantino aware and agrees with tx plan.     Isabela Azevedo PA-C

## 2017-08-23 PROBLEM — T40.2X5A THERAPEUTIC OPIOID-INDUCED CONSTIPATION (OIC): Status: ACTIVE | Noted: 2017-08-23

## 2017-08-23 PROBLEM — M25.552 CHRONIC LEFT HIP PAIN: Status: ACTIVE | Noted: 2017-08-23

## 2017-08-23 PROBLEM — R63.4 WEIGHT LOSS: Status: ACTIVE | Noted: 2017-08-23

## 2017-08-23 PROBLEM — K59.03 THERAPEUTIC OPIOID-INDUCED CONSTIPATION (OIC): Status: ACTIVE | Noted: 2017-08-23

## 2017-08-23 PROBLEM — G89.29 CHRONIC LEFT HIP PAIN: Status: ACTIVE | Noted: 2017-08-23

## 2017-08-23 PROBLEM — R26.89 ABNORMALITY OF GAIT DUE TO IMPAIRMENT OF BALANCE: Status: ACTIVE | Noted: 2017-08-23

## 2017-08-23 LAB
ALBUMIN SERPL-MCNC: 1.6 G/DL (ref 3.5–5)
ALBUMIN/GLOB SERPL: 0.5 {RATIO} (ref 1.1–2.2)
ALP SERPL-CCNC: 70 U/L (ref 45–117)
ALT SERPL-CCNC: 13 U/L (ref 12–78)
ANION GAP SERPL CALC-SCNC: 4 MMOL/L (ref 5–15)
AST SERPL-CCNC: 22 U/L (ref 15–37)
BACTERIA SPEC CULT: ABNORMAL
BACTERIA SPEC CULT: NORMAL
BASOPHILS # BLD: 0 K/UL (ref 0–0.1)
BASOPHILS NFR BLD: 0 % (ref 0–1)
BILIRUB SERPL-MCNC: 0.6 MG/DL (ref 0.2–1)
BUN SERPL-MCNC: 12 MG/DL (ref 6–20)
BUN/CREAT SERPL: 28 (ref 12–20)
CALCIUM SERPL-MCNC: 7.2 MG/DL (ref 8.5–10.1)
CHLORIDE SERPL-SCNC: 103 MMOL/L (ref 97–108)
CO2 SERPL-SCNC: 30 MMOL/L (ref 21–32)
CREAT SERPL-MCNC: 0.43 MG/DL (ref 0.7–1.3)
DATE LAST DOSE: ABNORMAL
EOSINOPHIL # BLD: 0.3 K/UL (ref 0–0.4)
EOSINOPHIL NFR BLD: 3 % (ref 0–7)
ERYTHROCYTE [DISTWIDTH] IN BLOOD BY AUTOMATED COUNT: 14.5 % (ref 11.5–14.5)
GLOBULIN SER CALC-MCNC: 3.5 G/DL (ref 2–4)
GLUCOSE BLD STRIP.AUTO-MCNC: 104 MG/DL (ref 65–100)
GLUCOSE BLD STRIP.AUTO-MCNC: 109 MG/DL (ref 65–100)
GLUCOSE BLD STRIP.AUTO-MCNC: 121 MG/DL (ref 65–100)
GLUCOSE BLD STRIP.AUTO-MCNC: 122 MG/DL (ref 65–100)
GLUCOSE SERPL-MCNC: 101 MG/DL (ref 65–100)
GRAM STN SPEC: ABNORMAL
HCT VFR BLD AUTO: 26 % (ref 36.6–50.3)
HGB BLD-MCNC: 8.3 G/DL (ref 12.1–17)
LYMPHOCYTES # BLD: 1.3 K/UL (ref 0.8–3.5)
LYMPHOCYTES NFR BLD: 10 % (ref 12–49)
MAGNESIUM SERPL-MCNC: 1.6 MG/DL (ref 1.6–2.4)
MCH RBC QN AUTO: 31.9 PG (ref 26–34)
MCHC RBC AUTO-ENTMCNC: 31.9 G/DL (ref 30–36.5)
MCV RBC AUTO: 100 FL (ref 80–99)
MONOCYTES # BLD: 0.8 K/UL (ref 0–1)
MONOCYTES NFR BLD: 6 % (ref 5–13)
NEUTS SEG # BLD: 10.8 K/UL (ref 1.8–8)
NEUTS SEG NFR BLD: 81 % (ref 32–75)
PLATELET # BLD AUTO: 214 K/UL (ref 150–400)
POTASSIUM SERPL-SCNC: 3.4 MMOL/L (ref 3.5–5.1)
PROT SERPL-MCNC: 5.1 G/DL (ref 6.4–8.2)
RBC # BLD AUTO: 2.6 M/UL (ref 4.1–5.7)
REPORTED DOSE,DOSE: ABNORMAL UNITS
REPORTED DOSE/TIME,TMG: ABNORMAL
SERVICE CMNT-IMP: ABNORMAL
SERVICE CMNT-IMP: NORMAL
SODIUM SERPL-SCNC: 137 MMOL/L (ref 136–145)
VANCOMYCIN TROUGH SERPL-MCNC: 28.9 UG/ML (ref 5–10)
WBC # BLD AUTO: 13.2 K/UL (ref 4.1–11.1)

## 2017-08-23 PROCEDURE — 97530 THERAPEUTIC ACTIVITIES: CPT

## 2017-08-23 PROCEDURE — 74011250636 HC RX REV CODE- 250/636: Performed by: INTERNAL MEDICINE

## 2017-08-23 PROCEDURE — 83735 ASSAY OF MAGNESIUM: CPT | Performed by: INTERNAL MEDICINE

## 2017-08-23 PROCEDURE — 74011000258 HC RX REV CODE- 258: Performed by: NURSE PRACTITIONER

## 2017-08-23 PROCEDURE — 97162 PT EVAL MOD COMPLEX 30 MIN: CPT

## 2017-08-23 PROCEDURE — 77030018836 HC SOL IRR NACL ICUM -A

## 2017-08-23 PROCEDURE — 77030019607 HC DSG BURN S&N -A

## 2017-08-23 PROCEDURE — C9113 INJ PANTOPRAZOLE SODIUM, VIA: HCPCS | Performed by: INTERNAL MEDICINE

## 2017-08-23 PROCEDURE — 97165 OT EVAL LOW COMPLEX 30 MIN: CPT | Performed by: OCCUPATIONAL THERAPIST

## 2017-08-23 PROCEDURE — 80053 COMPREHEN METABOLIC PANEL: CPT | Performed by: INTERNAL MEDICINE

## 2017-08-23 PROCEDURE — 65660000000 HC RM CCU STEPDOWN

## 2017-08-23 PROCEDURE — 74011000250 HC RX REV CODE- 250: Performed by: INTERNAL MEDICINE

## 2017-08-23 PROCEDURE — 82962 GLUCOSE BLOOD TEST: CPT

## 2017-08-23 PROCEDURE — 97535 SELF CARE MNGMENT TRAINING: CPT | Performed by: OCCUPATIONAL THERAPIST

## 2017-08-23 PROCEDURE — G8978 MOBILITY CURRENT STATUS: HCPCS

## 2017-08-23 PROCEDURE — 77030019604 HC DRSG WND CA ALG S&N -A

## 2017-08-23 PROCEDURE — 74011250637 HC RX REV CODE- 250/637: Performed by: INTERNAL MEDICINE

## 2017-08-23 PROCEDURE — 74011250637 HC RX REV CODE- 250/637: Performed by: NURSE PRACTITIONER

## 2017-08-23 PROCEDURE — 36415 COLL VENOUS BLD VENIPUNCTURE: CPT | Performed by: INTERNAL MEDICINE

## 2017-08-23 PROCEDURE — 85025 COMPLETE CBC W/AUTO DIFF WBC: CPT | Performed by: INTERNAL MEDICINE

## 2017-08-23 PROCEDURE — G8979 MOBILITY GOAL STATUS: HCPCS

## 2017-08-23 PROCEDURE — 74011250636 HC RX REV CODE- 250/636: Performed by: NURSE PRACTITIONER

## 2017-08-23 RX ORDER — HYDROMORPHONE HYDROCHLORIDE 1 MG/ML
0.2 INJECTION, SOLUTION INTRAMUSCULAR; INTRAVENOUS; SUBCUTANEOUS
Status: DISCONTINUED | OUTPATIENT
Start: 2017-08-23 | End: 2017-08-27

## 2017-08-23 RX ORDER — VANCOMYCIN HYDROCHLORIDE
1250 EVERY 12 HOURS
Status: DISCONTINUED | OUTPATIENT
Start: 2017-08-24 | End: 2017-08-26 | Stop reason: DRUGHIGH

## 2017-08-23 RX ORDER — POTASSIUM CHLORIDE 7.45 MG/ML
10 INJECTION INTRAVENOUS ONCE
Status: COMPLETED | OUTPATIENT
Start: 2017-08-23 | End: 2017-08-25

## 2017-08-23 RX ORDER — OXYCODONE AND ACETAMINOPHEN 5; 325 MG/1; MG/1
1 TABLET ORAL EVERY 6 HOURS
Status: DISCONTINUED | OUTPATIENT
Start: 2017-08-23 | End: 2017-08-27

## 2017-08-23 RX ADMIN — CEFEPIME HYDROCHLORIDE 2 G: 2 INJECTION, POWDER, FOR SOLUTION INTRAVENOUS at 11:30

## 2017-08-23 RX ADMIN — MONTELUKAST SODIUM 10 MG: 10 TABLET, FILM COATED ORAL at 09:54

## 2017-08-23 RX ADMIN — CASTOR OIL AND BALSAM, PERU: 788; 87 OINTMENT TOPICAL at 09:55

## 2017-08-23 RX ADMIN — DOCUSATE SODIUM AND SENNOSIDES 1 TABLET: 8.6; 5 TABLET, FILM COATED ORAL at 09:00

## 2017-08-23 RX ADMIN — CEFEPIME HYDROCHLORIDE 2 G: 2 INJECTION, POWDER, FOR SOLUTION INTRAVENOUS at 17:54

## 2017-08-23 RX ADMIN — ENOXAPARIN SODIUM 40 MG: 40 INJECTION SUBCUTANEOUS at 09:54

## 2017-08-23 RX ADMIN — GABAPENTIN 600 MG: 300 CAPSULE ORAL at 13:21

## 2017-08-23 RX ADMIN — RANOLAZINE 500 MG: 500 TABLET, FILM COATED, EXTENDED RELEASE ORAL at 09:55

## 2017-08-23 RX ADMIN — HYDROMORPHONE HYDROCHLORIDE 0.5 MG: 1 INJECTION, SOLUTION INTRAMUSCULAR; INTRAVENOUS; SUBCUTANEOUS at 11:37

## 2017-08-23 RX ADMIN — TAMSULOSIN HYDROCHLORIDE 0.8 MG: 0.4 CAPSULE ORAL at 09:55

## 2017-08-23 RX ADMIN — Medication 10 ML: at 22:12

## 2017-08-23 RX ADMIN — LIDOCAINE HYDROCHLORIDE 5 ML: 20 SOLUTION ORAL; TOPICAL at 17:56

## 2017-08-23 RX ADMIN — DOCUSATE SODIUM AND SENNOSIDES 1 TABLET: 8.6; 5 TABLET, FILM COATED ORAL at 17:55

## 2017-08-23 RX ADMIN — GABAPENTIN 600 MG: 300 CAPSULE ORAL at 09:55

## 2017-08-23 RX ADMIN — GABAPENTIN 600 MG: 300 CAPSULE ORAL at 22:12

## 2017-08-23 RX ADMIN — VANCOMYCIN HYDROCHLORIDE 1750 MG: 10 INJECTION, POWDER, LYOPHILIZED, FOR SOLUTION INTRAVENOUS at 00:26

## 2017-08-23 RX ADMIN — POLYETHYLENE GLYCOL 3350 17 G: 17 POWDER, FOR SOLUTION ORAL at 09:51

## 2017-08-23 RX ADMIN — ATORVASTATIN CALCIUM 10 MG: 10 TABLET, FILM COATED ORAL at 09:54

## 2017-08-23 RX ADMIN — ISOSORBIDE MONONITRATE 30 MG: 30 TABLET, EXTENDED RELEASE ORAL at 09:54

## 2017-08-23 RX ADMIN — POTASSIUM CHLORIDE 10 MEQ: 10 INJECTION, SOLUTION INTRAVENOUS at 09:48

## 2017-08-23 RX ADMIN — FLUTICASONE FUROATE AND VILANTEROL TRIFENATATE 1 PUFF: 100; 25 POWDER RESPIRATORY (INHALATION) at 09:57

## 2017-08-23 RX ADMIN — Medication 10 ML: at 05:35

## 2017-08-23 RX ADMIN — LIDOCAINE HYDROCHLORIDE 5 ML: 20 SOLUTION ORAL; TOPICAL at 09:56

## 2017-08-23 RX ADMIN — GABAPENTIN 600 MG: 300 CAPSULE ORAL at 17:55

## 2017-08-23 RX ADMIN — DULOXETINE HYDROCHLORIDE 30 MG: 30 CAPSULE, DELAYED RELEASE ORAL at 09:55

## 2017-08-23 RX ADMIN — LIDOCAINE HYDROCHLORIDE 5 ML: 20 SOLUTION ORAL; TOPICAL at 11:31

## 2017-08-23 RX ADMIN — HYDROMORPHONE HYDROCHLORIDE 0.5 MG: 1 INJECTION, SOLUTION INTRAMUSCULAR; INTRAVENOUS; SUBCUTANEOUS at 05:35

## 2017-08-23 RX ADMIN — Medication 1 CAPSULE: at 09:55

## 2017-08-23 RX ADMIN — LEVOTHYROXINE SODIUM 50 MCG: 50 TABLET ORAL at 09:55

## 2017-08-23 RX ADMIN — Medication 10 ML: at 13:22

## 2017-08-23 RX ADMIN — ASPIRIN 81 MG: 81 TABLET, COATED ORAL at 09:55

## 2017-08-23 RX ADMIN — CEFEPIME HYDROCHLORIDE 2 G: 2 INJECTION, POWDER, FOR SOLUTION INTRAVENOUS at 02:25

## 2017-08-23 RX ADMIN — ACETAMINOPHEN 1000 MG: 500 TABLET, FILM COATED ORAL at 09:54

## 2017-08-23 RX ADMIN — SODIUM CHLORIDE 40 MG: 9 INJECTION INTRAMUSCULAR; INTRAVENOUS; SUBCUTANEOUS at 09:54

## 2017-08-23 NOTE — PROGRESS NOTES
PCU SHIFT NURSING NOTE      Bedside and Verbal shift change report given to Shannon Rueda RN (oncoming nurse) by Benjy Castro RN (offgoing nurse). Report included the following information SBAR, Kardex, ED Summary, Intake/Output, MAR, Recent Results and Alarm Parameters . Shift Summary: 2143    8541: Pt c/o back & hip pain. PRN Dilaudid given. Bed linens changed & pt repositioned. Call bell left within reach. Will continue to monitor. 0800: Bedside and Verbal shift change report given to Kevin Ewing RN (oncoming nurse) by Shannon Rueda RN (offgoing nurse). Report included the following information SBAR, Kardex, ED Summary, Procedure Summary, Intake/Output, MAR, Recent Results and Cardiac Rhythm NSR. Admission Date 8/17/2017   Admission Diagnosis Abdominal pain  Elevated troponin   Consults IP CONSULT TO CARDIOLOGY  IP CONSULT TO ORTHOPEDIC SURGERY  IP CONSULT TO PALLIATIVE CARE - PROVIDER        Consults   [x]PT   []OT   []Speech   [x]Case Management      [] Palliative      Cardiac Monitoring Order   [x]Yes   []No     IV drips   []Yes    Drip:                            Dose:  Drip:                            Dose:  Drip:                            Dose:   [x]No     GI Prophylaxis   [x]Yes   []No         DVT Prophylaxis   SCDs:             Jesus stockings:         [x] Medication   []Contraindicated   []None      Activity Level Activity Level: Bed Rest     Activity Assistance: Partial (two people)   Purposeful Rounding every 1-2 hour? [x]Yes   Miller Score  Total Score: 3   Bed Alarm (If score 3 or >)   [x]Yes   [] Refused (See signed refusal form in chart)   Kwan Score  Kwan Score: 14   Kwan Score (if score 14 or less)   []PMT consult   []Wound Care consult      []Specialty bed   [] Nutrition consult          Needs prior to discharge:   Home O2 required:    [x]Yes   []No    If yes, how much O2 required?     Other:    Last Bowel Movement: Last Bowel Movement Date: 08/20/17      Influenza Vaccine Received Flu Vaccine for Current Season (usually Sept-March): Not Flu Season        Pneumonia Vaccine           Diet Active Orders   Diet    DIET DYSPHAGIA ADVANCED SOFT (NDD3)      LDAs           Triple Lumen 08/17/17 Right;Upper Subclavian (Active)   Central Line Being Utilized Yes 8/21/2017  8:51 PM   Criteria for Appropriate Use Limited/no vessel suitable for conventional peripheral access 8/21/2017  8:51 PM   Site Assessment Clean, dry, & intact 8/21/2017  8:51 PM   Infiltration Assessment 0 8/21/2017  8:51 PM   Affected Extremity/Extremities Color distal to insertion site pink (or appropriate for race) 8/21/2017  8:51 PM   Date of Last Dressing Change 08/17/17 8/21/2017  7:16 AM   Dressing Status Clean, dry, & intact 8/21/2017  8:51 PM   Dressing Type Disk with Chlorhexadine gluconate (CHG) 8/21/2017  8:51 PM   Action Taken Blood drawn 8/21/2017  7:16 AM   Proximal Hub Color/Line Status White;Capped 8/21/2017  8:51 PM   Positive Blood Return (Medial Site) Yes 8/21/2017  8:51 PM   Medial Hub Color/Line Status Blue;Capped 8/21/2017  8:51 PM   Positive Blood Return (Lateral Site) Yes 8/21/2017  8:51 PM   Distal Hub Color/Line Status Eddie Starch; Infusing 8/21/2017  8:51 PM   Positive Blood Return (Site #3) Yes 8/21/2017  8:51 PM   External Catheter Length (cm) 5 centimeters 8/17/2017  3:01 PM   Alcohol Cap Used Yes 8/21/2017  7:16 AM              Condom Catheter 08/20/17 (Active)   Criteria for Appropriate Use Strict I/Os 8/22/2017 11:00 AM   Status Draining 8/22/2017 11:00 AM   Site Condition No abnormalities 8/22/2017 11:00 AM   Drainage Tube Clipped to Bed Yes 8/21/2017  7:16 AM   Catheter Secured to Thigh No 8/21/2017  7:16 AM   Tamper Seal Intact No 8/21/2017  7:16 AM   Bag Below Bladder/Not on Floor Yes 8/21/2017  7:16 AM   Lack of Dependent Loop in Tubing Yes 8/21/2017  7:16 AM   Drainage Bag Less Than Half Full Yes 8/21/2017  7:16 AM   Sterile Solution Used for  Irrigation N/A 8/21/2017  7:16 AM   Urine Output (mL) 350 ml 8/22/2017 3:17 PM                Urinary Catheter Condom Catheter 08/20/17-Criteria for Appropriate Use: Strict I/Os    Intake & Output   Date 08/21/17 1900 - 08/22/17 0659 08/22/17 0700 - 08/23/17 0659   Shift 1223-1898 24 Hour Total 9822-8078 5132-8326 24 Hour Total   I  N  T  A  K  E   P.O.  240 420  420      P. O.  240 420  420    I.V.  (mL/kg/hr) 1200 1200 500  (0.4)  500      Volume (sodium chloride 0.9 % bolus infusion 250 mL)   300  300      Volume (cefepime (MAXIPIME) 2 g in 0.9% sodium chloride (MBP/ADV) 100 mL) 300 300 200  200      Volume (vancomycin (VANCOCIN) 1750 mg in  ml infusion) 500 500         Volume (potassium chloride 10 mEq in 100 ml IVPB) 400 400       Shift Total  (mL/kg) 1200  (11.6) 1440  (13.9) 920  (8.9)  920  (8.9)   O  U  T  P  U  T   Urine  (mL/kg/hr)   1150  (0.9)  1150      Urine Output (mL) (Condom Catheter 08/20/17)   1150  1150    Shift Total  (mL/kg)   1150  (11.1)  1150  (11.1)   NET 1200 1440 -230  -230   Weight (kg) 103.2 103.2 103.2 103.2 103.2         Readmission Risk Assessment Tool Score High Risk            33       Total Score        3 Has Seen PCP in Last 6 Months (Yes=3, No=0)    2 . Living with Significant Other. Assisted Living. LTAC. SNF. or   Rehab    3 Patient Length of Stay (>5 days = 3)    4 IP Visits Last 12 Months (1-3=4, 4=9, >4=11)    5 Pt.  Coverage (Medicare=5 , Medicaid, or Self-Pay=4)    16 Charlson Comorbidity Score (Age + Comorbid Conditions)       Expected Length of Stay 1d 21h   Actual Length of Stay 5

## 2017-08-23 NOTE — CONSULTS
Palliative Medicine Consult  [place]: [phone] ([phone])    Patient Name: Guerline Davis  YOB: 1932    Date of Initial Consult: 8/23/17  Reason for Consult:  Care decisions/pain mmt? Requesting Provider: Maryann Hernandez   Primary Care Physician: Renita Ortega MD      SUMMARY:   Guerline Davis is a 80 y. o. with a past history of  CAD, DM II, HTN, dyslipidemia, AAA, LBBB, GERD, and MI, and PSHx of stent placement x 2, who was admitted on 8/17/2017 from transferred from [facility] ED with a diagnosis of elevated troponin. Current medical issues leading to Palliative Medicine involvement include: severe left hip pain, PNA, canker sores 2/2 drug reaction, . PER :  9/2016-5/2017 Tyl #3 #60 q. Month Dr.Kevin Golden  5/2017-6/2017 Norco 5/325mg #30 q. 2 weeks Dr. Santacruz Bowels  6/5/2017 Norco 5/325mg #90/23 days Guille Kelly  6/26/17 Norco 5/325mg #30/8 days Lilromanaa Bowels  7/5/17 percocet 5/325mg #90/23 days, Guille Kelly  7/16/17 oxycodone 15 IR #20/4 days, Ally Lucas  7/22/17 diazepam 5mg #30/15 days Wenda Leaks  7/23/17 oxycodone 15 IR #30/5 days Wenda Leaks     PALLIATIVE DIAGNOSES:   1. Chest pain: HCAP  2. Abdominal pain  3. Nausea and vomiting  4. Left hip pain x 3 months (chronic impacted left femoral head fracture): 8/21 hip aspiration  5. Sacral decubitus ulcer: stage 3  6. Diabetic polyneuropathy   7. Obesity   8. Severe protein calorie malnutrition   9. canker sores, MRSA pos  10. Weight loss (240 lbs on 7/19/17)  (now 227 lbs)  Not eating with canker sores x 2 weeks  11. Opioid induced constipation  12. Gait impairment     PLAN:   1. Met with pt, who was sleeping, too sleepy to talk in detail. Will return tomorrow at noon to meet with wife. 2. For pain control, Pt needs his hip replaced, until then, we are just going to sedate him with opioids, which is not a good long term plan, but will have to do for now. 3. Schedule Percocet 5/325mg q. 6 hours. 4. D/c tylenol 1000mg.    5. Reduce prn dilaudid to 0.2mg q. 4 hours prn.  6. Avoid long-acting opioids at this time. 7. CONSIDER KUB; NO BM THIS ADMISSION (6 DAYS), despite taking senna and miralax. 8. Initial consult note routed to primary continuity provider  9. Communicated plan of care with: Palliative IDT, RN     GOALS OF CARE / TREATMENT PREFERENCES:   [====Goals of Care====]  GOALS OF CARE:  Patient / health care proxy stated goals:     Pending meeting tomorrow      TREATMENT PREFERENCES:   Code Status: DNR    Advance Care Planning:  Advance Care Planning 8/18/2017   Patient's Healthcare Decision Maker is: Legal Next of Genevieve Palencia   Primary Decision Maker Name Radha Slater   Primary Decision Maker Phone Number -   Primary Decision Maker Relationship to Patient Spouse   Secondary Decision Maker Name -   Secondary Decision Maker Phone Number -   Secondary Decision Maker Relationship to Patient -   Confirm Advance Directive Yes, not on file       Other:    The palliative care team has discussed with patient / health care proxy about goals of care / treatment preferences for patient.  [====Goals of Care====]         HISTORY:     History obtained from: chart    CHIEF COMPLAINT: left hip pain    HPI/SUBJECTIVE:    The patient is:   [x] Verbal and participatory  [] Non-participatory due to:     BIBA to [facility] ED with c/o intermittent dull mild diffuse abd pain x 2 weeks, intermittent CP for past few weeks that got worse on day of admission, N/V that started day of admission. W/u at [facility] revealed a stable 5.3x5 cm infrarenal AAA, distended gallbladder, mild pelvic free fluid; labs significant for WBC 13.6, AST of 104; rest of LFT's WNL; troponin was 0.32; a central line was placed. Pt reports AAA was diagnosed five years ago and is inoperable. he has history of MI and stent placement x2 and is followed by Dr. Andres Marlow, cardiologist. Pt denies taking blood thinners, takes a daily baby ASA.  Pt notes he was diagnosed with PNA four days ago, which is being treated with Levaquin. Today, pt asleep, easily awakened, states his pain is currently 8/10, which he states is tolerable; he received 0.5mg IV dilaudid at 0500 and 11am.  Yesterday he received two doses of 0.5mg dilaudid, also had fentanyl patch 25 mcg/hr removed. Clinical Pain Assessment (nonverbal scale for severity on nonverbal patients):   [++++ Clinical Pain Assessment++++]  [++++Pain Severity++++]: Pain: 88/10  [++++Pain Character++++]: sharp  [++++Pain Duration++++]:   \"a long time\"  [++++Pain Effect++++]:   Unable to mobilize  [++++Pain Factors++++]:  movement  [++++Pain Frequency++++]: with movement  [++++Pain Location++++]: left hip  [++++ Clinical Pain Assessment++++]  Duration: for how long has pt been experiencing pain (e.g., 2 days, 1 month, years)  Frequency: how often pain is an issue (e.g., several times per day, once every few days, constant)     FUNCTIONAL ASSESSMENT:     Palliative Performance Scale (PPS):  PPS: 20       PSYCHOSOCIAL/SPIRITUAL SCREENING:     Advance Care Planning:  Advance Care Planning 8/18/2017   Patient's Healthcare Decision Maker is: Legal Next of Genevieve 69   Primary Decision Maker Name Dave Rangel   Primary Decision Maker Phone Number -   Primary Decision Maker Relationship to Patient Spouse   Secondary Decision Maker Name -   Secondary Decision [facility] Phone Number -   Secondary Decision Maker Relationship to Patient -   Confirm Advance Directive Yes, not on file        Any spiritual / [employer]ology concerns:  [] Yes /  [x] No    Caregiver Burnout:  [] Yes /  [] No /  [x] No Caregiver Present      Anticipatory grief assessment:   [x] Normal  / [] Maladaptive       ESAS Anxiety: Anxiety: 0    ESAS Depression: Depression: 2        REVIEW OF SYSTEMS:     Positive and pertinent negative findings in ROS are noted above in HPI. The following systems were [x] reviewed / [] unable to be reviewed as noted in HPI  Other findings are noted below.   Systems: constitutional, ears/nose/mouth/throat, respiratory, gastrointestinal, genitourinary, musculoskeletal, integumentary, neurologic, psychiatric, endocrine. Positive findings noted below. Modified ESAS Completed by: provider   Fatigue: 8 Drowsiness: 4   Depression: 2 Pain: 8   Anxiety: 0 Nausea: 0   Anorexia: 0 Dyspnea: 0     Constipation: Yes              PHYSICAL EXAM:     From RN flowsheet:  Wt Readings from Last 3 Encounters:   08/21/17 227 lb 9.6 oz (103.2 kg)   08/17/17 217 lb 13 oz (98.8 kg)   08/13/17 220 lb 0.3 oz (99.8 kg)     Blood pressure 94/51, pulse 87, temperature 97.8 °F (36.6 °C), resp. rate 16, weight 227 lb 9.6 oz (103.2 kg), SpO2 94 %.     Pain Scale 1: Numeric (0 - 10)  Pain Intensity 1: 5  Pain Onset 1: acute  Pain Location 1: Buttocks, Back  Pain Orientation 1: Posterior  Pain Description 1: Aching  Pain Intervention(s) 1: Other (comment) (not due for pain med)  Last bowel movement, if known: PT DOES NOT REMEMBER, NO BM DOCUMENTED THIS ADMISSION    Constitutional: elderly, obese, awake, alert, oriented to self, situation  Eyes: pupils equal, anicteric  ENMT: no nasal discharge, moist mucous membranes  Cardiovascular: regular rhythm, distal pulses intact  Respiratory: breathing not labored, symmetric  Gastrointestinal: soft non-tender, +bowel sounds  Musculoskeletal: no deformity, pain with left hip passive ROM  Skin: warm, dry, stage 3 decubitus ulcer  Neurologic: following commands, moving all extremities  Psychiatric: full affect, no hallucinations         HISTORY:     Active Problems:    Coronary artery disease involving native coronary artery of native heart without angina pectoris (7/14/2017)      Essential hypertension (7/14/2017)      Abdominal pain (8/17/2017)      Elevated troponin (8/17/2017)      CAP (community acquired pneumonia) (8/19/2017)      Past Medical History:   Diagnosis Date    AAA (abdominal aortic aneurysm) (HCC)     Asthma     CAD (coronary artery disease)     Mitzy MPI 7/14 LVEF 45, chronic inf-lat ischemia; Echo 7/14 LVEF 45, mod LVH, mild LAE, mild AI/MR    Diabetic polyneuropathy (HCC)     DJD (degenerative joint disease)     DM II (diabetes mellitus, type II), controlled ([facility]     Dyslipidemia     GERD (gastroesophageal reflux disease)     History of left bundle branch block (LBBB)     HTN (hypertension)     Myocardial infarction ([facility]     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    Pneumonia     Prostate CA ([facility] [address] [facility] 2012    s/p XRT, hormonal      Past Surgical History:   Procedure Laterality Date    HX APPENDECTOMY      HX HEMORRHOIDECTOMY      HX PTCA      x 2 stents, vessel unknown, last 8 yrs ago      Family History   Problem Relation Age of Onset    Tuberculosis Mother     Asthma Father     Coronary Artery Disease Sister     Asthma Sister       History reviewed, no pertinent family history.   Social History   Substance Use Topics    Smoking status: Former Smoker     Types: Cigarettes     Quit date: 7/14/1997    Smokeless tobacco: Never Used    Alcohol use No     Allergies   Allergen Reactions    Contrast Agent [Iodine] Hives    Levaquin [Levofloxacin] Unknown (comments)    Sulfa (Sulfonamide Antibiotics) Unknown (comments)      Current Facility-Administered Medications   Medication Dose Route Frequency    [START ON 8/24/2017] vancomycin (VANCOCIN) 1250 mg in  ml infusion  1,250 mg IntraVENous Q12H    labetalol (NORMODYNE;TRANDATE) injection 10 mg  10 mg IntraVENous Q6H PRN    lactobac ac& pc-s.therm-b.anim (DULCE Q/RISAQUAD)  1 Cap Oral DAILY    ranolazine ER (RANEXA) tablet 500 mg  500 mg Oral BID    diazePAM (VALIUM) tablet 5 mg  5 mg Oral Q8H PRN    HYDROmorphone (PF) (DILAUDID) injection 0.5 mg  0.5 mg IntraVENous Q4H PRN    cefepime (MAXIPIME) 2 g in 0.9% sodium chloride (MBP/ADV) 100 mL  2 g IntraVENous Q8H    balsam peru-castor oil (VENELEX)  mg/gram ointment   Topical DAILY    magic mouthwash cpd (with sucralfate)  5 mL Oral TIDAC    aspirin delayed-release tablet 81 mg 81 mg Oral DAILY    atorvastatin (LIPITOR) tablet 10 mg  10 mg Oral DAILY    DULoxetine (CYMBALTA) capsule 30 mg  30 mg Oral DAILY    fluticasone-vilanterol (BREO ELLIPTA) 100mcg-25mcg/puff  1 Puff Inhalation DAILY    gabapentin (NEURONTIN) capsule 600 mg  600 mg Oral QID    isosorbide mononitrate ER (IMDUR) tablet 30 mg  30 mg Oral DAILY    levothyroxine (SYNTHROID) tablet 50 mcg  50 mcg Oral ACB    montelukast (SINGULAIR) tablet 10 mg  10 mg Oral DAILY    nitroglycerin (NITROSTAT) tablet 0.4 mg  0.4 mg SubLINGual PRN    tamsulosin (FLOMAX) capsule 0.8 mg  0.8 mg Oral DAILY    pantoprazole (PROTONIX) 40 mg in sodium chloride 0.9 % 10 mL injection  40 mg IntraVENous DAILY    ondansetron (ZOFRAN) injection 4 mg  4 mg IntraVENous Q4H PRN    sodium chloride (NS) flush 5-10 mL  5-10 mL IntraVENous Q8H    sodium chloride (NS) flush 5-10 mL  5-10 mL IntraVENous PRN    acetaminophen (TYLENOL) tablet 1,000 mg  1,000 mg Oral TID    oxyCODONE IR (ROXICODONE) tablet 10 mg  10 mg Oral Q4H PRN    naloxone (NARCAN) injection 0.4 mg  0.4 mg IntraVENous PRN    enoxaparin (LOVENOX) injection 40 mg  40 mg SubCUTAneous DAILY    insulin lispro (HUMALOG) injection   SubCUTAneous AC&HS    glucose chewable tablet 16 g  4 Tab Oral PRN    glucagon (GLUCAGEN) injection 1 mg  1 mg IntraMUSCular PRN    dextrose 10% infusion 125-250 mL  125-250 mL IntraVENous PRN    senna-docusate (PERICOLACE) 8.6-50 mg per tablet 1 Tab  1 Tab Oral BID    polyethylene glycol (MIRALAX) packet 17 g  17 g Oral DAILY          LAB AND IMAGING FINDINGS:     Lab Results   Component Value Date/Time    WBC 13.2 08/23/2017 03:08 AM    HGB 8.3 08/23/2017 03:08 AM    PLATELET 726 99/57/7184 03:08 AM     Lab Results   Component Value Date/Time    Sodium 137 08/23/2017 03:08 AM    Potassium 3.4 08/23/2017 03:08 AM    Chloride 103 08/23/2017 03:08 AM    CO2 30 08/23/2017 03:08 AM    BUN 12 08/23/2017 03:08 AM    Creatinine 0.43 08/23/2017 03:08 AM Calcium 7.2 08/23/2017 03:08 AM    Magnesium 1.6 08/23/2017 03:08 AM      Lab Results   Component Value Date/Time    AST (SGOT) 22 08/23/2017 03:08 AM    Alk. phosphatase 70 08/23/2017 03:08 AM    Protein, total 5.1 08/23/2017 03:08 AM    Albumin 1.6 08/23/2017 03:08 AM    Globulin 3.5 08/23/2017 03:08 AM     Lab Results   Component Value Date/Time    INR 1.4 08/17/2017 02:39 PM    Prothrombin time 15.7 08/17/2017 02:39 PM    aPTT 20.4 08/17/2017 02:39 PM      No results found for: IRON, FE, TIBC, IBCT, PSAT, FERR   No results found for: PH, PCO2, PO2  No components found for: [facility], [place])   Lab Results   Component Value Date/Time    CK 23 08/17/2017 05:30 PM    CK - MB 1.8 08/17/2017 05:30 PM                Total time: 75 min  Counseling / coordination time, spent as noted above: 65 min  > 50% counseling / coordination?: yes  Prolonged service was provided for  []30 min   []75 min in face to face time in the presence of the patient, spent as noted above. Time Start:   Time End:   Note: this can only be billed with 82785 (initial) or 31325 (follow up). If multiple start / stop times, list each separately.

## 2017-08-23 NOTE — PROGRESS NOTES
Pharmacy Automatic Renal Dosing Protocol - Antimicrobials    Indication for Antimicrobials: HAP    Current Regimen of Each Antimicrobial (Start Day & Day of Therapy):  Cefepime 2 gm IV every 8 hours (Started 17; Day #6  Vancomycin 1750 mg IV every 12 hours (Started 17; Day #6    Goal Vancomycin Trough: 15-20 mcg/mL    Vancomycin Trough   17 at 2316: 20.4 mcg/mL (True trough = 13.6 mcg/mL)   @ 2300: 28.9 mcg/ml (True trough 27.7 mcg/ml)  Significant Cultures:   17 Wound culture = MRSA - pending  17 Blood culture = NG -  pending  : Wound cx: pending    Labs:   Recent Labs      17   0308  17   0404  17   0323   CREA  0.43*  0.46*  0.49*   BUN  12  13  18   WBC  13.2*  12.6*  13.8*     Temp (24hrs), Av.2 °F (36.2 °C), Min:96.5 °F (35.8 °C), Max:97.4 °F (36.3 °C)    Creatinine Clearance:  >60 mL/min    Impression/Plan:   - continue with the current dose of cefepime  - Vancomycin trough of 28.9 (calculated trough 27.7 mcg/ml)  - Will adjust the vancomycin to 1250 mg q 12 hours and this will yield an estimated trough of 19 mcg/ml. Will also change the start time of the new dose pass the 12 hour les from the last dose since the trough was above the goal range. - Will follow wound cx from   - Renal function stable     Pharmacy will follow daily and adjust medications as appropriate for renal function and/or serum levels.     Thank you,  Chastity Phillips, PHARMD

## 2017-08-23 NOTE — PROGRESS NOTES
Hospitalist Progress Note    NAME: Yu Palacios   :  1932   MRN:  [id number]       Interim Hospital Summary: 80 y.o. male whom presented on 2017 with      Assessment / Plan:  HCAP  - CXR revaled Left upper lobe atelectasis/consolidation. - continue with IV Cefepime and Vanc. Pt developed severe allergic reaction after taking levofloxacin which resulted severe canker sores. - O2 Sat 96% at 2L of O2 via n/c; wean O2 as tolerate  Canker sores  - magic mouth wash; asked nursing staff to help with oral care using sponges or swish and spit when pt is able. Much improved  Chest Pain  Elevated Troponin  History of CAD s/p remote PCI  Stress Test negative on 17  Hyperlipidemia'  Chronic Systolic Heart Failure: (may be end stage Cardiomyopathy)  - discussed with the family and pt yesterday in regards of code status and goals of care. Patient and the family requested continue with current care, do not wish to have any invasive procedures.  Pt and the family asked for optimal pain control. Will consult palliative team to follow. - no further chest pain since we added Ranexa   - EKG with TWI in V1-V5  - troponin went up to 0.55 from 0.32  - cardiology following; communicated with cardiology team on pt's wish. Conservative medical management. Cardiology team will follow as need basis  - Echo: Ejection fraction was estimated in the range of 15 % to 20 %  - continue ASA, BB, Ranexa, & IMDUR  - continue statin, check CXR may need diuresis soon  Hypotension: hold Coreg, ACE, given IVF bolus, monitor. BP had improved  Abdominal Pain: now resolved  Nausea/vomiting  - CT Abdomen/Pelvis: Distended gallbladder. Infrarenal abdominal aortic aneurysm measures 5.3 x 5 cm (no changes from previous images), Colonic diverticulosis. - Abdominal ultrasound:Sludge layers dependently in the nondistended gallbladder.  No wall thickening or pericholecystic fluid.    - Avoid NSAID  - IV PPI & IV antiemetics  - stop diclofenac  Chronic Impacted Left Femoral Head Fracture: ideally needs total hip replacement to control pain  - D/c  fentanyl patch  May be contributing to hypotension and excessive sedation  - scheduled Acetaminophen  - prn Oxycodone or dilaudid, valium as need for severe muscle spasm  - Dr. Herberth Jin spoke with Dr. Clara Anderson in Radiology to get addendum added on to patient CT A/P which was preformed at [facility] detailing patient's left hip pathology  - ortho following; pain management. IR for aspiration to r/o septic joint  Sacral Decub:  - consult wound care; STAGE 4 decubitus ulcer  Type 2 diabetes mellitus without complication, without long-term current use of insulin   Diabetic polyneuropathy  - hold home oral antihyperglycemics and place on SSI  - Hgb A1c 6.6  - continue home Gabapentin  Abdominal aortic aneurysm (AAA)   - stable on today's CT:Infrarenal abdominal aortic aneurysm measures 5.3 x 5 cm  Obesity  Severe Protein Calorie Malnutrition  -consult Nutrition  Hypokalemia: replace and monitor. Hypomagnesemia: replace and monitor. Code Status: DNR   DVT Prophylaxis: Lovenox  GI Prophylaxis: PPI as above   Baseline: Bed bound for last 3 months per patient report  Recommended Disposition:SNF      Subjective:     Chief Complaint / Reason for Physician Visit  \"I feel a little better\". Discussed with RN events overnight. Review of Systems:  Symptom Y/N Comments  Symptom Y/N Comments   Fever/Chills    Chest Pain     Poor Appetite    Edema y    Cough    Abdominal Pain     Sputum    Joint Pain y    SOB/LEWIS    Pruritis/Rash     Nausea/vomit    Tolerating PT/OT     Diarrhea    Tolerating Diet y    Constipation    Other       Could NOT obtain due to:      Objective:     VITALS:   Last 24hrs VS reviewed since prior progress note.  Most recent are:  Patient Vitals for the past 24 hrs:   Temp Pulse Resp BP SpO2   08/23/17 1318 97.6 °F (36.4 °C) 88 18 97/55 94 %   08/23/17 0947 98.2 °F (36.8 °C) 88 - 127/61 98 %   08/23/17 0600 - 79 - 131/78 98 %   08/23/17 0500 - 77 - 131/66 97 %   08/23/17 0400 - 78 - 100/48 95 %   08/23/17 0300 97.4 °F (36.3 °C) 75 17 125/72 98 %   08/23/17 0100 - 77 - 117/60 92 %   08/23/17 0001 - 76 - 115/58 96 %   08/22/17 2300 97.5 °F (36.4 °C) 77 18 113/57 97 %   08/22/17 2050 - 74 - 103/60 97 %   08/22/17 2040 - 73 - 104/57 97 %   08/22/17 2030 - 74 - 105/57 98 %   08/22/17 2000 97.9 °F (36.6 °C) 77 18 95/50 96 %   08/22/17 1930 - 76 - 94/53 97 %   08/22/17 1741 - 83 - 109/59 -   08/22/17 1523 - - - 108/54 -   08/22/17 1519 97.4 °F (36.3 °C) 75 18 105/59 97 %       Intake/Output Summary (Last 24 hours) at 08/23/17 1451  Last data filed at 08/23/17 1331   Gross per 24 hour   Intake              880 ml   Output             1375 ml   Net             -495 ml        PHYSICAL EXAM:  General: WD, WN.awake, no acute distress    EENT:  EOMI. Anicteric sclerae. MMM  Resp:  Coarse BS  CV:  Regular  rhythm,  No edema  GI:  Soft, obese, Non tender.  +Bowel sounds  Neurologic:  Opened eyes briefly during exam then fall asleep. normal speech  Psych:   Fair insight. Not anxious nor agitated  Skin:  No rashes. No jaundice    Reviewed most current lab test results and cultures  YES  Reviewed most current radiology test results   YES  Review and summation of old records today    NO  Reviewed patient's current orders and MAR    YES  PMH/SH reviewed - no change compared to H&P  ________________________________________________________________________  Care Plan discussed with:    Comments   Patient y    Family      RN y    Care Manager y    Consultant  y Ortho                    y Multidiciplinary team rounds were held today with , nursing, pharmacist and clinical coordinator. Patient's plan of care was discussed; medications were reviewed and discharge planning was addressed.      ________________________________________________________________________  Total NON critical care TIME:  30   Minutes    Total CRITICAL CARE TIME Spent:   Minutes non procedure based      Comments   >50% of visit spent in counseling and coordination of care     ________________________________________________________________________  Ruby Cohen MD     Procedures: see electronic medical records for all procedures/Xrays and details which were not copied into this note but were reviewed prior to creation of Plan. LABS:  I reviewed today's most current labs and imaging studies.   Pertinent labs include:  Recent Labs      08/23/17 0308 08/22/17   0404  08/21/17   0323   WBC  13.2*  12.6*  13.8*   HGB  8.3*  8.5*  8.9*   HCT  26.0*  26.7*  27.6*   PLT  214  207  218     Recent Labs      08/23/17 0308  08/22/17   0404  08/21/17   0323   NA  137  139  142   K  3.4*  3.4*  3.2*   CL  103  105  109*   CO2  30  32  29   GLU  101*  106*  102*   BUN  12  13  18   CREA  0.43*  0.46*  0.49*   CA  7.2*  7.4*  7.2*   MG  1.6  1.3*   --    ALB  1.6*   --    --    TBILI  0.6   --    --    SGOT  22   --    --    ALT  13   --    --        Signed: )Ruby Cohen MD

## 2017-08-23 NOTE — PROGRESS NOTES
Problem: Self Care Deficits Care Plan (Adult)  Goal: *Acute Goals and Plan of Care (Insert Text)  Occupational Therapy Goals:  Initiated 8/23/2017  1. Patient will perform grooming seated with set up within 7 days. 2. Patient will perform bathing with moderate assistance within 7 days. 3. Patient will perform toileting with maximal assistance within 7 days. 4. Patient will transfer from bedside commode with moderate assist using the least restrictive device and appropriate durable medical equipment within 7 days. OCCUPATIONAL THERAPY EVALUATION  Patient: Fernando Zelaya (84 y.o. male)  Date: 8/23/2017  Primary Diagnosis: Abdominal pain  Elevated troponin        Precautions:   DNR, Fall, Skin (EF15%; Stage 4 sacrum)      ASSESSMENT :  Based on the objective data described below, the patient presents with general weakness but was very motivated to mobilize and participate. Pt was at [facility] for rehab and prior to April pt was independent with ADLS and mobility. Progressive weakness since April. Pt does have a left femoral head fracture that cannot be repaired and pt reports ambulating with RW at NH and able to stand and pull up and down pants with assist for balance. 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 Spoke with nursing and wound care nurse to obtain order for pt. Obtained waffle air cushion for pt from wound care nurse for OOB. Moderate assist x2 for bed mobility this session with attention to not shear buttocks. Pt was on air mattress this session and even with mattress max inflated pt presented with poor dynamic balance. Moderate to max assist x2 for sit to stand this session with flexed posture and unable to come to full standing. Will bring walker for next session. Full support in sitting for pt to wash his face edge of bed. Pt is performing UB ADLS at a set up with back support and lower body ADLs at a total assist level.   Recommend return to SNF at discharge. Patient will benefit from skilled intervention to address the above impairments. Patients rehabilitation potential is considered to be Good  Factors which may influence rehabilitation potential include:   [ ]             None noted  [ ]             Mental ability/status  [X]             Medical condition  [ ]             Home/family situation and support systems  [ ]             Safety awareness  [ ]             Pain tolerance/management  [ ]             Other:        PLAN :  Recommendations and Planned Interventions:  [X]               Self Care Training                  [X]        Therapeutic Activities  [X]               Functional Mobility Training    [ ]        Cognitive Retraining  [X]               Therapeutic Exercises           [ ]        Endurance Activities  [X]               Balance Training                   [ ]        Neuromuscular Re-Education  [ ]               Visual/Perceptual Training     [X]   Home Safety Training  [X]               Patient Education                 [X]        Family Training/Education  [ ]               Other (comment):     Frequency/Duration: Patient will be followed by occupational therapy 4 times a week to address goals. Discharge Recommendations: Skilled Nursing Facility  Further Equipment Recommendations for Discharge: TBD       SUBJECTIVE:   Patient stated It feels good to sit up. I am bored.       OBJECTIVE DATA SUMMARY:   HISTORY:   Past Medical History:   Diagnosis Date    [facility]      Asthma      CAD (coronary artery disease)       Mitzy MPI 7/14 LVEF 45, chronic inf-lat ischemia; Echo 7/14 LVEF 45, mod LVH, mild LAE, mild AI/MR    Diabetic polyneuropathy (HCC)      DJD (degenerative joint disease)      DM II (diabetes mellitus, type II), controlled ([facility]      Dyslipidemia      GERD (gastroesophageal reflux disease)      History of left bundle branch block (LBBB)      HTN (hypertension)      Myocardial infarction [facility])       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 Progressive weakness since April. Expanded or extensive additional review of patient history:      Home Situation  Home Environment: [address] [place] [address] Name: [address] [facility]  One/Two Story Residence: One story  Living Alone: No  Support Systems: Family member(s), Child(alen), Spouse/Significant Other/Partner  Patient Expects to be Discharged to[de-identified] Private residence  Current DME Used/Available at Home: Alphonza Stefano, rollator, Walker, rolling, Cane, straight  Tub or Shower Type: Shower  [ ]  Right hand dominant   [ ]  Left hand dominant     EXAMINATION OF PERFORMANCE DEFICITS:  Cognitive/Behavioral Status:  Neurologic State: Alert  Orientation Level: Appropriate for age;Oriented X4  Cognition: Appropriate decision making; Appropriate for age attention/concentration; Appropriate safety awareness; Follows commands  Perception: Appears intact  Perseveration: No perseveration noted  Safety/Judgement: Decreased awareness of environment;Decreased insight into deficits        Hearing: Auditory  Auditory Impairment: None     Vision/Perceptual:    Tracking: Able to track stimulus in all quadrants w/o difficulty                      Acuity: Impaired far vision    Corrective Lenses: Glasses     Range of Motion:     AROM: Generally decreased, functional                          Strength:     Strength: Generally decreased, functional                 Coordination:  Coordination: Generally decreased, functional  Fine Motor Skills-Upper: Left Intact; Right Intact    Gross Motor Skills-Upper: Left Intact; Right Intact     Tone & Sensation:     Tone: Normal  Sensation: Intact                       Balance:  Sitting: Impaired; With support  Sitting - Static: Fair (occasional); Supported sitting  Sitting - Dynamic: Fair (occasional)  Standing: Impaired; With support  Standing - Static: Constant support     Functional Mobility and Transfers for ADLs:  Bed Mobility:  Supine to Sit: Moderate assistance; Additional time;Assist x2  Sit to Supine: Moderate assistance; Additional time;Assist x2     Transfers:  Sit to Stand: Moderate assistance;Assist x2  Stand to Sit: Moderate assistance;Assist x2  Bed to Chair:  (unable this date)  Toilet Transfer :  (unable this date)     ADL Assessment:  Feeding: Setup     Oral Facial Hygiene/Grooming: Setup (with back support unable without back support soft bed surfa)     Bathing: Moderate assistance (needs back support for balance on air mattress)     Upper Body Dressing: Minimum assistance     Lower Body Dressing: Total assistance     Toileting: Total assistance                 ADL Intervention and task modifications:  See assessment  Cognitive Retraining  Safety/Judgement: Decreased awareness of environment;Decreased insight into deficits        Functional Measure:  Barthel Index:      Bathin  Bladder: 10  Bowels: 10  Groomin  Dressin  Feedin  Mobility: 0  Stairs: 0  Toilet Use: 0  Transfer (Bed to Chair and Back): 0  Total: 25         Barthel and G-code impairment scale:  Percentage of impairment CH  0% CI  1-19% CJ  20-39% CK  40-59% CL  60-79% CM  80-99% CN  100%   Barthel Score 0-100 100 99-80 79-60 59-40 20-39 1-19    0   Barthel Score 0-20 20 17-19 13-16 9-12 5-8 1-4 0      The Barthel ADL Index: Guidelines  1. The index should be used as a record of what a patient does, not as a record of what a patient could do. 2. The main aim is to establish degree of independence from any help, physical or verbal, however minor and for whatever reason.   3. The need for supervision renders the patient not independent. 4. A patient's performance should be established using the best available evidence. Asking the patient, friends/relatives and nurses are the usual sources, but direct observation and common sense are also important. However direct testing is not needed. 5. Usually the patient's performance over the preceding 24-48 hours is important, but occasionally longer periods will be relevant. 6. Middle categories imply that the patient supplies over 50 per cent of the effort. 7. Use of aids to be independent is allowed. Nelsy Lozano., Barthel, D.W. ([id number]). Functional evaluation: the Barthel Index. [address] [facility] (14)2. [facility] Hospital[facility], Bev Fang., Ravindra Rodriguez., [place], [address] [place] [address] Ave (1999). Measuring the change indisability after inpatient rehabilitation; comparison of the responsiveness of the Barthel Index and Functional [place] Measure. Journal of Neurology, Neurosurgery, and Psychiatry, 66(4), 477-924. Herbie Duarte, N.J.A, ISABEL Cabrales, & Pat Alvarado M.A. (2004.) Assessment of post-stroke quality of life in cost-effectiveness studies: The usefulness of the Barthel Index and the EuroQoL-5D. Quality of Life Research, 13, 254-15            G codes: In compliance with CMSs Claims Based Outcome Reporting, the following G-code set was chosen for this patient based on their primary functional limitation being treated: The outcome measure chosen to determine the severity of the functional limitation was the barthel with a score of 25/100 which was correlated with the impairment scale.       · Self Care:               - CURRENT STATUS:    CL - 60%-79% impaired, limited or restricted               - GOAL STATUS:           CK - 40%-59% impaired, limited or restricted               - D/C STATUS:                       ---------------To be determined---------------      Occupational Therapy Evaluation Charge Determination   History Examination Decision-Making   LOW Complexity : Brief history review  MEDIUM Complexity : 3-5 performance deficits relating to physical, cognitive , or psychosocial skils that result in activity limitations and / or participation restrictions HIGH Complexity : Patient presents with comorbidities that affect occupational performance. Signifigant modification of tasks or assistance (eg, physical or verbal) with assessment (s) is necessary to enable patient to complete evaluation       Based on the above components, the patient evaluation is determined to be of the following complexity level: LOW   Pain:  Pain Scale 1: Numeric (0 - 10)  Pain Intensity 1: 5  Pain Location 1: Buttocks;Back  Pain Orientation 1: Posterior  Pain Description 1: Aching  Pain Intervention(s) 1: Other (comment) (not due for pain med)  Activity Tolerance:      Please refer to the flowsheet for vital signs taken during this treatment. After treatment:   [ ] Patient left in no apparent distress sitting up in chair  [X] Patient left in no apparent distress in bed; rolled to right side for pressure relief and pillow between knees  [X] Call bell left within reach  [X] Nursing notified  [ ] Caregiver present  [ ] Bed alarm activated      COMMUNICATION/EDUCATION:   The patients plan of care was discussed with: Physical Therapist, Registered Nurse and patient. Wound care nurse  [ ] Home safety education was provided and the patient/caregiver indicated understanding. [X] Patient have participated as able in goal setting and plan of care. [X] Patient agree to work toward stated goals and plan of care. [ ] Patient understands intent and goals of therapy, but is neutral about his/her participation. [ ] Patient is unable to participate in goal setting and plan of care. This patients plan of care is appropriate for delegation to [facility].      Thank you for this referral.  Catherine Olmos OTR/L  Time Calculation: 33 mins

## 2017-08-23 NOTE — PROGRESS NOTES
Problem: Mobility Impaired (Adult and Pediatric)  Goal: *Acute Goals and Plan of Care (Insert Text)  Physical Therapy Goals  Initiated 8/23/2017  1. Patient will move from supine to sit and sit to supine in bed with minimal assistance/contact guard assist within 7 day(s). 2. Patient will transfer from bed to chair and chair to bed with minimal assistance/contact guard assist using the least restrictive device within 7 day(s). 3. Patient will perform sit to stand with minimal assistance/contact guard assist within 7 day(s). 4. Patient will ambulate with moderate assistance for 50 feet with the least restrictive device within 7 day(s). PHYSICAL THERAPY EVALUATION  Patient: Vinicio Sweet (46 y.o. male)  Date: 8/23/2017  Primary Diagnosis: Abdominal pain  Elevated troponin        Precautions:   DNR, Fall      ASSESSMENT :  Based on the objective data described below, the patient presents with c/o left hip pain, impaired sitting and standing balance, decreased activity tolerance, and decreased strength. Patient came to hospital from SNF rehab where he reports he was ambulating short distances using RW with assistance. Per chart, patient has been primarily bedridden for last 3 months due to hip pain. Patient was scheduled to have left [facility] which was cancelled. Patient required mod assist x 2 for bed mobility and to stand this date (HHA x 2). Will trial RW next visit and possible transfer to chair as able. Wound nurse cleared patient to sit up in chair with waffle cushion due to stage IV sacral wound. Recommend SNF at discharge. Will continue to follow to progress mobility. Patient will benefit from skilled intervention to address the above impairments.   Patients rehabilitation potential is considered to be Fair  Factors which may influence rehabilitation potential include:   [ ]         None noted  [ ]         Mental ability/status  [X]         Medical condition  [ ]         Home/family situation and support systems  [ ]         Safety awareness  [X]         Pain tolerance/management  [ ]         Other:        PLAN :  Recommendations and Planned Interventions:  [X]           Bed Mobility Training             [ ]    Neuromuscular Re-Education  [X]           Transfer Training                   [ ]    Orthotic/Prosthetic Training  [X]           Gait Training                         [ ]    Modalities  [X]           Therapeutic Exercises           [ ]    Edema Management/Control  [X]           Therapeutic Activities            [X]    Patient and Family Training/Education  [ ]           Other (comment):     Frequency/Duration: Patient will be followed by physical therapy  4 times a week to address goals. Discharge Recommendations: Skilled Nursing Facility  Further Equipment Recommendations for Discharge: none       SUBJECTIVE:   Patient stated Let me try to move it [left leg].       OBJECTIVE DATA SUMMARY:   HISTORY:    Past Medical History:   Diagnosis Date    [facility]ab[facility]) ([facility] [address] [facility]      Asthma      CAD (coronary artery disease)       Mitzy MPI 7/14 LVEF 45, chronic inf-lat ischemia; Echo 7/14 LVEF 45, mod LVH, mild LAE, mild AI/MR    Diabetic polyneuropathy (HCC)      DJD (degenerative joint disease)      DM II (diabetes mellitus, type II), controlled ([facility] [address] [facility]      Dyslipidemia      GERD (gastroesophageal reflux disease)      History of left bundle branch block (LBBB)      HTN (hypertension)      Myocardial infarction ([facility] [address]       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    Pneumonia      Prostate CA ([facility] [address] 2012     s/p XRT, hormonal     Past Surgical History:   Procedure Laterality Date    HX APPENDECTOMY        HX HEMORRHOIDECTOMY        HX PTCA         x 2 stents, vessel unknown, last 10 yrs ago     Prior Level of Function/Home Situation: mostly bedbound for last 3 months due to hip pain, but was ambulating short distances using RW at [facility] most recently  Personal factors and/or comorbidities impacting plan of care:      Home Situation  Home Environment: Private residence  One/Two Story Residence: One story  Living Alone: No  Support Systems: Family member(s), Child(alen), Spouse/Significant Other/Partner  Patient Expects to be Discharged to[de-identified] Private residence  Current DME Used/Available at Home: Lanelle Berkshire, rollator, Walker, rolling, Cane, straight     EXAMINATION/PRESENTATION/DECISION MAKING:   Critical Behavior:  Neurologic State: Alert  Orientation Level: Oriented X4  Cognition: Appropriate decision making, Follows commands  Safety/Judgement: Decreased awareness of environment, Decreased insight into deficits  Hearing: Auditory  Auditory Impairment: None  Skin:  Dressing in place to sacral wound  Edema: none noted  Range Of Motion:  AROM: Generally decreased, functional                       Strength:    Strength: Generally decreased, functional                    Tone & Sensation:   Tone: Normal              Sensation: Intact               Coordination:  Coordination: Generally decreased, functional  Vision:      Functional Mobility:  Bed Mobility:     Supine to Sit: Moderate assistance; Additional time;Assist x2  Sit to Supine: Moderate assistance; Additional time;Assist x2     Transfers:  Sit to Stand: Moderate assistance;Assist x2  Stand to Sit: Moderate assistance;Assist x2                       Balance:   Sitting: Impaired; With support  Sitting - Static: Fair (occasional); Supported sitting  Sitting - Dynamic: Fair (occasional)  Standing: Impaired; With support  Standing - Static: Constant support  Ambulation/Gait Training:              Gait Description (WDL):  (unable to attempt this date due to impaired standing balance)                             Therapeutic Exercises:    Ankle pumps, LAQs while seated EOB     Functional Measure:  Fairchild Balance Test:      Sitting to Standin  Standing Unsupported: 0  Sitting with Back Unsupported: 3  Standing to Sittin  Transfers: 0  Standing Unsupported with Eyes Closed: 0  Standing Unsupported with Feet Together: 0  Reach Forward with Outstretched Arm: 0   Object: 0  Turn to Look Over Shoulders: 0  Turn 360 Degrees: 0  Alternate Foot on Step/Stool: 0  Standing Unsupported One Foot in Front: 0  Stand on One Le  Total: 3             56=Maximum possible score;   0-20=High fall risk  21-40=Moderate fall risk   41-56=Low fall risk      Fairchild Balance Test and G-code impairment scale:  Percentage of Impairment CH     0%    CI     1-19% CJ     20-39% CK     40-59% CL     60-79% CM     80-99% CN      100%   Fairchild   Score 0-56 56 45-55 34-44 23-33 12-22 1-11 0            G codes: In compliance with CMSs Claims Based Outcome Reporting, the following G-code set was chosen for this patient based on their primary functional limitation being treated: The outcome measure chosen to determine the severity of the functional limitation was the Manjinder Roam with a score of 3/56 which was correlated with the impairment scale. · Mobility - Walking and Moving Around:               - CURRENT STATUS:    CM - 80%-99% impaired, limited or restricted               - GOAL STATUS:           CL - 60%-79% impaired, limited or restricted               - D/C STATUS:                       ---------------To be determined---------------         Pain:  Pain Scale 1: Numeric (0 - 10)  Pain Intensity 1: 3  Pain Location 1: Buttocks  Pain Orientation 1: Mid  Pain Description 1: Aching;Constant  Pain Intervention(s) 1: Medication (see MAR); Repositioned  Activity Tolerance:   VSS  Please refer to the flowsheet for vital signs taken during this treatment.   After treatment:   [ ]         Patient left in no apparent distress sitting up in chair  [X]         Patient left in no apparent distress in bed  [X]         Call bell left within reach  [X]         Nursing notified  [ ]         Caregiver present  [ ]         Bed alarm activated      COMMUNICATION/EDUCATION:   The patients plan of care was discussed with: Occupational Therapist and Registered Nurse.  [X]         Fall prevention education was provided and the patient/caregiver indicated understanding. [X]         Patient/family have participated as able in goal setting and plan of care. [X]         Patient/family agree to work toward stated goals and plan of care. [ ]         Patient understands intent and goals of therapy, but is neutral about his/her participation. [ ]         Patient is unable to participate in goal setting and plan of care.      Thank you for this referral.  Lady Tiejrina, PT   Time Calculation: 31 mins

## 2017-08-23 NOTE — PROGRESS NOTES
Problem: Pain  Goal: *Control of Pain  Outcome: Progressing Towards Goal  Patient reports adequate pain relief with medication and repositioning.

## 2017-08-23 NOTE — WOUND CARE
Wound care nurse re-consulted to see patients sacral/buttock wounds POA that I was unable to assess on admit secondary to patient uncontrolled pain. Patient is a 79 y/o CM with chronic pain from left impacted left femoral head fx and was admitted with HCAP and chronic CHF and N/V w/abdominal pain. Patient refuses to turn on left side. Patient actually has x3 Stage 3 PI's grouped together on left and right buttocks near anal verge. I measured the entire area and counted the wound as 1. WOUND POA CONDITIONS    Wound Buttocks Posterior;Left;Right (Active)   DRESSING STATUS Removed 8/23/2017  3:55 PM   DRESSING TYPE Foam 8/23/2017  3:55 PM   Pressure Injury Stage lll 8/23/2017  3:55 PM   Wound Length (cm) 3.3 cm 8/23/2017  3:55 PM   Wound Width (cm) 3 cm 8/23/2017  3:55 PM   Wound Depth (cm) 0.3 8/23/2017  3:55 PM   Wound Surface area (cm^3) 2.97 cm^2 8/23/2017  3:55 PM   Condition of [place] 8/23/2017  3:55 PM   Condition of Edges Open 8/23/2017  3:55 PM   Tissue Type Pink;Yellow 8/23/2017  3:55 PM   Tissue Type Percent Pink 85 8/23/2017  3:55 PM   Tissue Type Percent Yellow 15 8/23/2017  3:55 PM   Drainage Amount  Scant 8/23/2017  3:55 PM   Drainage Color Serosanguinous 8/23/2017  3:55 PM   Wound Odor None 8/23/2017  3:55 PM   Periwound Skin Condition Erythema, blanchable 8/23/2017  3:55 PM   Cleansing and Cleansing Agents  Normal saline 8/23/2017  3:55 PM   Dressing Type Applied Moist to dry 8/23/2017  3:55 PM   Procedure Tolerated Well 8/23/2017  3:55 PM   Number of days:6           Recommend:    Sacral/buttocks wound: daily, cleanse with NS, wipe wounds clean. Apply a smear of wound gel to each wound base, cover with a small square of Aquacell-AG and then a sacral foam dressing.   Frida Adames RN, CWON, zone ph# [phone]

## 2017-08-24 ENCOUNTER — APPOINTMENT (OUTPATIENT)
Dept: GENERAL RADIOLOGY | Age: 82
DRG: 177 | End: 2017-08-24
Attending: INTERNAL MEDICINE
Payer: MEDICARE

## 2017-08-24 PROBLEM — R63.0 ANOREXIA: Status: ACTIVE | Noted: 2017-08-24

## 2017-08-24 LAB
ALBUMIN SERPL-MCNC: 1.6 G/DL (ref 3.5–5)
ALBUMIN/GLOB SERPL: 0.4 {RATIO} (ref 1.1–2.2)
ALP SERPL-CCNC: 80 U/L (ref 45–117)
ALT SERPL-CCNC: 11 U/L (ref 12–78)
ANION GAP SERPL CALC-SCNC: 5 MMOL/L (ref 5–15)
AST SERPL-CCNC: 19 U/L (ref 15–37)
BASOPHILS # BLD: 0 K/UL (ref 0–0.1)
BASOPHILS NFR BLD: 0 % (ref 0–1)
BILIRUB SERPL-MCNC: 0.5 MG/DL (ref 0.2–1)
BUN SERPL-MCNC: 9 MG/DL (ref 6–20)
BUN/CREAT SERPL: 21 (ref 12–20)
CALCIUM SERPL-MCNC: 7.4 MG/DL (ref 8.5–10.1)
CHLORIDE SERPL-SCNC: 99 MMOL/L (ref 97–108)
CO2 SERPL-SCNC: 31 MMOL/L (ref 21–32)
CREAT SERPL-MCNC: 0.43 MG/DL (ref 0.7–1.3)
EOSINOPHIL # BLD: 0.3 K/UL (ref 0–0.4)
EOSINOPHIL NFR BLD: 2 % (ref 0–7)
ERYTHROCYTE [DISTWIDTH] IN BLOOD BY AUTOMATED COUNT: 14.7 % (ref 11.5–14.5)
GLOBULIN SER CALC-MCNC: 3.6 G/DL (ref 2–4)
GLUCOSE BLD STRIP.AUTO-MCNC: 115 MG/DL (ref 65–100)
GLUCOSE BLD STRIP.AUTO-MCNC: 118 MG/DL (ref 65–100)
GLUCOSE BLD STRIP.AUTO-MCNC: 126 MG/DL (ref 65–100)
GLUCOSE BLD STRIP.AUTO-MCNC: 141 MG/DL (ref 65–100)
GLUCOSE SERPL-MCNC: 95 MG/DL (ref 65–100)
HCT VFR BLD AUTO: 25.8 % (ref 36.6–50.3)
HGB BLD-MCNC: 8.2 G/DL (ref 12.1–17)
LYMPHOCYTES # BLD: 1.4 K/UL (ref 0.8–3.5)
LYMPHOCYTES NFR BLD: 10 % (ref 12–49)
MAGNESIUM SERPL-MCNC: 1.5 MG/DL (ref 1.6–2.4)
MCH RBC QN AUTO: 31.5 PG (ref 26–34)
MCHC RBC AUTO-ENTMCNC: 31.8 G/DL (ref 30–36.5)
MCV RBC AUTO: 99.2 FL (ref 80–99)
MONOCYTES # BLD: 1 K/UL (ref 0–1)
MONOCYTES NFR BLD: 7 % (ref 5–13)
NEUTS SEG # BLD: 10.6 K/UL (ref 1.8–8)
NEUTS SEG NFR BLD: 81 % (ref 32–75)
PLATELET # BLD AUTO: 231 K/UL (ref 150–400)
POTASSIUM SERPL-SCNC: 3.2 MMOL/L (ref 3.5–5.1)
PROT SERPL-MCNC: 5.2 G/DL (ref 6.4–8.2)
RBC # BLD AUTO: 2.6 M/UL (ref 4.1–5.7)
SERVICE CMNT-IMP: ABNORMAL
SODIUM SERPL-SCNC: 135 MMOL/L (ref 136–145)
WBC # BLD AUTO: 13.2 K/UL (ref 4.1–11.1)

## 2017-08-24 PROCEDURE — 65660000000 HC RM CCU STEPDOWN

## 2017-08-24 PROCEDURE — 74011636637 HC RX REV CODE- 636/637: Performed by: INTERNAL MEDICINE

## 2017-08-24 PROCEDURE — 74011000250 HC RX REV CODE- 250: Performed by: INTERNAL MEDICINE

## 2017-08-24 PROCEDURE — 83735 ASSAY OF MAGNESIUM: CPT | Performed by: INTERNAL MEDICINE

## 2017-08-24 PROCEDURE — 94640 AIRWAY INHALATION TREATMENT: CPT

## 2017-08-24 PROCEDURE — 71010 XR CHEST PORT: CPT

## 2017-08-24 PROCEDURE — 85025 COMPLETE CBC W/AUTO DIFF WBC: CPT | Performed by: INTERNAL MEDICINE

## 2017-08-24 PROCEDURE — C9113 INJ PANTOPRAZOLE SODIUM, VIA: HCPCS | Performed by: INTERNAL MEDICINE

## 2017-08-24 PROCEDURE — 74011250636 HC RX REV CODE- 250/636: Performed by: INTERNAL MEDICINE

## 2017-08-24 PROCEDURE — 74011250636 HC RX REV CODE- 250/636: Performed by: NURSE PRACTITIONER

## 2017-08-24 PROCEDURE — 80053 COMPREHEN METABOLIC PANEL: CPT | Performed by: INTERNAL MEDICINE

## 2017-08-24 PROCEDURE — 77030037878 HC DRSG MEPILEX >48IN BORD MOLN -B

## 2017-08-24 PROCEDURE — 77030011255 HC DSG AQUACEL AG BMS -A

## 2017-08-24 PROCEDURE — 74011250637 HC RX REV CODE- 250/637: Performed by: INTERNAL MEDICINE

## 2017-08-24 PROCEDURE — 74011250637 HC RX REV CODE- 250/637: Performed by: NURSE PRACTITIONER

## 2017-08-24 PROCEDURE — 74011000258 HC RX REV CODE- 258: Performed by: NURSE PRACTITIONER

## 2017-08-24 PROCEDURE — 82962 GLUCOSE BLOOD TEST: CPT

## 2017-08-24 PROCEDURE — 36415 COLL VENOUS BLD VENIPUNCTURE: CPT | Performed by: INTERNAL MEDICINE

## 2017-08-24 PROCEDURE — 77030029684 HC NEB SM VOL KT MONA -A

## 2017-08-24 RX ORDER — CARVEDILOL 3.12 MG/1
3.12 TABLET ORAL 2 TIMES DAILY WITH MEALS
Status: DISCONTINUED | OUTPATIENT
Start: 2017-08-24 | End: 2017-08-24

## 2017-08-24 RX ORDER — FUROSEMIDE 10 MG/ML
20 INJECTION INTRAMUSCULAR; INTRAVENOUS DAILY
Status: DISCONTINUED | OUTPATIENT
Start: 2017-08-24 | End: 2017-08-24

## 2017-08-24 RX ORDER — POTASSIUM CHLORIDE 750 MG/1
20 TABLET, FILM COATED, EXTENDED RELEASE ORAL DAILY
Status: DISCONTINUED | OUTPATIENT
Start: 2017-08-24 | End: 2017-08-30 | Stop reason: HOSPADM

## 2017-08-24 RX ORDER — ACETYLCYSTEINE 200 MG/ML
600 SOLUTION ORAL; RESPIRATORY (INHALATION) EVERY 12 HOURS
Status: DISCONTINUED | OUTPATIENT
Start: 2017-08-24 | End: 2017-08-24

## 2017-08-24 RX ORDER — POTASSIUM CHLORIDE 7.45 MG/ML
10 INJECTION INTRAVENOUS
Status: COMPLETED | OUTPATIENT
Start: 2017-08-24 | End: 2017-08-25

## 2017-08-24 RX ORDER — MAGNESIUM CITRATE
296 SOLUTION, ORAL ORAL
Status: COMPLETED | OUTPATIENT
Start: 2017-08-24 | End: 2017-08-24

## 2017-08-24 RX ORDER — LANOLIN ALCOHOL/MO/W.PET/CERES
400 CREAM (GRAM) TOPICAL DAILY
Status: DISCONTINUED | OUTPATIENT
Start: 2017-08-24 | End: 2017-08-28

## 2017-08-24 RX ORDER — ACETAMINOPHEN 325 MG/1
650 TABLET ORAL
Status: DISCONTINUED | OUTPATIENT
Start: 2017-08-24 | End: 2017-08-30 | Stop reason: HOSPADM

## 2017-08-24 RX ORDER — IPRATROPIUM BROMIDE AND ALBUTEROL SULFATE 2.5; .5 MG/3ML; MG/3ML
3 SOLUTION RESPIRATORY (INHALATION)
Status: DISCONTINUED | OUTPATIENT
Start: 2017-08-24 | End: 2017-08-25

## 2017-08-24 RX ORDER — MAGNESIUM SULFATE 1 G/100ML
1 INJECTION INTRAVENOUS ONCE
Status: COMPLETED | OUTPATIENT
Start: 2017-08-24 | End: 2017-08-25

## 2017-08-24 RX ORDER — FUROSEMIDE 10 MG/ML
40 INJECTION INTRAMUSCULAR; INTRAVENOUS EVERY 12 HOURS
Status: DISCONTINUED | OUTPATIENT
Start: 2017-08-24 | End: 2017-08-24

## 2017-08-24 RX ADMIN — IPRATROPIUM BROMIDE AND ALBUTEROL SULFATE 3 ML: .5; 3 SOLUTION RESPIRATORY (INHALATION) at 19:59

## 2017-08-24 RX ADMIN — Medication 10 ML: at 07:12

## 2017-08-24 RX ADMIN — ISOSORBIDE MONONITRATE 30 MG: 30 TABLET, EXTENDED RELEASE ORAL at 09:52

## 2017-08-24 RX ADMIN — Medication 10 ML: at 21:24

## 2017-08-24 RX ADMIN — Medication 10 ML: at 13:50

## 2017-08-24 RX ADMIN — CEFEPIME HYDROCHLORIDE 2 G: 2 INJECTION, POWDER, FOR SOLUTION INTRAVENOUS at 17:36

## 2017-08-24 RX ADMIN — POTASSIUM CHLORIDE 20 MEQ: 750 TABLET, FILM COATED, EXTENDED RELEASE ORAL at 09:54

## 2017-08-24 RX ADMIN — ATORVASTATIN CALCIUM 10 MG: 10 TABLET, FILM COATED ORAL at 09:56

## 2017-08-24 RX ADMIN — CEFEPIME HYDROCHLORIDE 2 G: 2 INJECTION, POWDER, FOR SOLUTION INTRAVENOUS at 03:42

## 2017-08-24 RX ADMIN — SODIUM CHLORIDE 40 MG: 9 INJECTION INTRAMUSCULAR; INTRAVENOUS; SUBCUTANEOUS at 10:00

## 2017-08-24 RX ADMIN — Medication 400 MG: at 09:56

## 2017-08-24 RX ADMIN — DOCUSATE SODIUM AND SENNOSIDES 1 TABLET: 8.6; 5 TABLET, FILM COATED ORAL at 09:55

## 2017-08-24 RX ADMIN — LIDOCAINE HYDROCHLORIDE 5 ML: 20 SOLUTION ORAL; TOPICAL at 08:31

## 2017-08-24 RX ADMIN — GABAPENTIN 600 MG: 300 CAPSULE ORAL at 12:20

## 2017-08-24 RX ADMIN — MONTELUKAST SODIUM 10 MG: 10 TABLET, FILM COATED ORAL at 09:55

## 2017-08-24 RX ADMIN — LEVOTHYROXINE SODIUM 50 MCG: 50 TABLET ORAL at 07:14

## 2017-08-24 RX ADMIN — VANCOMYCIN HYDROCHLORIDE 1250 MG: 10 INJECTION, POWDER, LYOPHILIZED, FOR SOLUTION INTRAVENOUS at 13:47

## 2017-08-24 RX ADMIN — POTASSIUM CHLORIDE 10 MEQ: 10 INJECTION, SOLUTION INTRAVENOUS at 08:32

## 2017-08-24 RX ADMIN — ENOXAPARIN SODIUM 40 MG: 40 INJECTION SUBCUTANEOUS at 09:56

## 2017-08-24 RX ADMIN — ASPIRIN 81 MG: 81 TABLET, COATED ORAL at 09:55

## 2017-08-24 RX ADMIN — GABAPENTIN 600 MG: 300 CAPSULE ORAL at 21:24

## 2017-08-24 RX ADMIN — OXYCODONE HYDROCHLORIDE AND ACETAMINOPHEN 1 TABLET: 5; 325 TABLET ORAL at 23:16

## 2017-08-24 RX ADMIN — OXYCODONE HYDROCHLORIDE AND ACETAMINOPHEN 1 TABLET: 5; 325 TABLET ORAL at 17:08

## 2017-08-24 RX ADMIN — SODIUM CHLORIDE 250 ML: 900 INJECTION, SOLUTION INTRAVENOUS at 14:21

## 2017-08-24 RX ADMIN — INSULIN LISPRO 2 UNITS: 100 INJECTION, SOLUTION INTRAVENOUS; SUBCUTANEOUS at 17:06

## 2017-08-24 RX ADMIN — Medication 1 CAPSULE: at 09:52

## 2017-08-24 RX ADMIN — IPRATROPIUM BROMIDE AND ALBUTEROL SULFATE 3 ML: .5; 3 SOLUTION RESPIRATORY (INHALATION) at 15:05

## 2017-08-24 RX ADMIN — GABAPENTIN 600 MG: 300 CAPSULE ORAL at 09:53

## 2017-08-24 RX ADMIN — OXYCODONE HYDROCHLORIDE AND ACETAMINOPHEN 1 TABLET: 5; 325 TABLET ORAL at 01:28

## 2017-08-24 RX ADMIN — MAGNESIUM SULFATE HEPTAHYDRATE 1 G: 1 INJECTION, SOLUTION INTRAVENOUS at 12:18

## 2017-08-24 RX ADMIN — GABAPENTIN 600 MG: 300 CAPSULE ORAL at 17:07

## 2017-08-24 RX ADMIN — CASTOR OIL AND BALSAM, PERU: 788; 87 OINTMENT TOPICAL at 09:00

## 2017-08-24 RX ADMIN — MAGESIUM CITRATE 296 ML: 1.75 LIQUID ORAL at 15:47

## 2017-08-24 RX ADMIN — VANCOMYCIN HYDROCHLORIDE 1250 MG: 10 INJECTION, POWDER, LYOPHILIZED, FOR SOLUTION INTRAVENOUS at 23:16

## 2017-08-24 RX ADMIN — CEFEPIME HYDROCHLORIDE 2 G: 2 INJECTION, POWDER, FOR SOLUTION INTRAVENOUS at 11:01

## 2017-08-24 RX ADMIN — TAMSULOSIN HYDROCHLORIDE 0.8 MG: 0.4 CAPSULE ORAL at 09:52

## 2017-08-24 RX ADMIN — OXYCODONE HYDROCHLORIDE AND ACETAMINOPHEN 1 TABLET: 5; 325 TABLET ORAL at 07:12

## 2017-08-24 RX ADMIN — LIDOCAINE HYDROCHLORIDE 5 ML: 20 SOLUTION ORAL; TOPICAL at 17:14

## 2017-08-24 RX ADMIN — POLYETHYLENE GLYCOL 3350 17 G: 17 POWDER, FOR SOLUTION ORAL at 09:51

## 2017-08-24 RX ADMIN — DOCUSATE SODIUM AND SENNOSIDES 1 TABLET: 8.6; 5 TABLET, FILM COATED ORAL at 17:08

## 2017-08-24 RX ADMIN — FUROSEMIDE 20 MG: 10 INJECTION, SOLUTION INTRAMUSCULAR; INTRAVENOUS at 12:21

## 2017-08-24 RX ADMIN — POTASSIUM CHLORIDE 10 MEQ: 10 INJECTION, SOLUTION INTRAVENOUS at 09:50

## 2017-08-24 RX ADMIN — RANOLAZINE 500 MG: 500 TABLET, FILM COATED, EXTENDED RELEASE ORAL at 17:07

## 2017-08-24 RX ADMIN — VANCOMYCIN HYDROCHLORIDE 1250 MG: 10 INJECTION, POWDER, LYOPHILIZED, FOR SOLUTION INTRAVENOUS at 01:28

## 2017-08-24 RX ADMIN — HYDROMORPHONE HYDROCHLORIDE 0.2 MG: 1 INJECTION, SOLUTION INTRAMUSCULAR; INTRAVENOUS; SUBCUTANEOUS at 03:48

## 2017-08-24 RX ADMIN — RANOLAZINE 500 MG: 500 TABLET, FILM COATED, EXTENDED RELEASE ORAL at 09:53

## 2017-08-24 RX ADMIN — FLUTICASONE FUROATE AND VILANTEROL TRIFENATATE 1 PUFF: 100; 25 POWDER RESPIRATORY (INHALATION) at 09:59

## 2017-08-24 RX ADMIN — LIDOCAINE HYDROCHLORIDE 5 ML: 20 SOLUTION ORAL; TOPICAL at 12:25

## 2017-08-24 RX ADMIN — OXYCODONE HYDROCHLORIDE AND ACETAMINOPHEN 1 TABLET: 5; 325 TABLET ORAL at 12:20

## 2017-08-24 RX ADMIN — DULOXETINE HYDROCHLORIDE 30 MG: 30 CAPSULE, DELAYED RELEASE ORAL at 09:54

## 2017-08-24 RX ADMIN — HYDROMORPHONE HYDROCHLORIDE 0.2 MG: 1 INJECTION, SOLUTION INTRAMUSCULAR; INTRAVENOUS; SUBCUTANEOUS at 08:35

## 2017-08-24 NOTE — PROGRESS NOTES
Hospitalist Progress Note    NAME: Nick Faith   :  1932   MRN:  [id number]       Interim Hospital Summary: 80 y.o. male whom presented on 2017 with      Assessment / Plan:  HCAP  - CXR revaled Left upper lobe atelectasis/consolidation. - continue with IV Cefepime and Vanc. Pt developed severe allergic reaction after taking levofloxacin which resulted severe canker sores. - O2 Sat 96% at 2L of O2 via n/c; wean O2 as tolerate  CXR still showing mass like atelectatic area in left lung will try mucomyst nebs and get Pulmonology advice. Canker sores  - magic mouth wash; asked nursing staff to help with oral care using sponges or swish and spit when pt is able. Resolved  Chest Pain  Elevated Troponin  History of CAD s/p remote PCI  Stress Test negative on 17  Hyperlipidemia'  Chronic Systolic Heart Failure: (may be end stage Cardiomyopathy)  - discussed with the family and pt yesterday in regards of code status and goals of care. Patient and the family requested continue with current care, do not wish to have any invasive procedures.  Pt and the family asked for optimal pain control. Will consult palliative team to follow. - no further chest pain since we added Ranexa   - EKG with TWI in V1-V5  - troponin went up to 0.55 from 0.32  - cardiology following; communicated with cardiology team on pt's wish. Conservative medical management. Cardiology team will follow as need basis  - Echo: Ejection fraction was estimated in the range of 15 % to 20 %  - continue ASA, BB, Ranexa, & IMDUR  - continue statin, CXR not in pulmonary edema, will resume low dose diuretic  Hypotension: resume low dose Coreg, ACE, given IVF bolus, monitor. BP had improved  Abdominal Pain: now resolved  Nausea/vomiting  - CT Abdomen/Pelvis: Distended gallbladder. Infrarenal abdominal aortic aneurysm measures 5.3 x 5 cm (no changes from previous images), Colonic diverticulosis.   - Abdominal ultrasound:Sludge layers dependently in the nondistended gallbladder. No wall thickening or pericholecystic fluid.    - Avoid NSAID  - IV PPI & IV antiemetics  - stop diclofenac  Chronic Impacted Left Femoral Head Fracture: ideally needs total hip replacement to control pain  - D/c  fentanyl patch  May be contributing to hypotension and excessive sedation  - scheduled Acetaminophen  - prn Oxycodone or dilaudid, valium as need for severe muscle spasm  - Dr. Arthur Kuhn spoke with Dr. Agnes Fernandes in Radiology to get addendum added on to patient CT A/P which was preformed at [facility] detailing patient's left hip pathology  - ortho following; pain management. IR for aspiration to r/o septic joint  Sacral Decub:  - consult wound care; STAGE 4 decubitus ulcer  Type 2 diabetes mellitus without complication, without long-term current use of insulin   Diabetic polyneuropathy  - hold home oral antihyperglycemics and place on SSI  - Hgb A1c 6.6  - continue home Gabapentin  Abdominal aortic aneurysm (AAA)   - stable on today's CT:Infrarenal abdominal aortic aneurysm measures 5.3 x 5 cm  Obesity  Severe Protein Calorie Malnutrition  -consult Nutrition  Hypokalemia: replace and monitor. Replaced today  Hypomagnesemia: replace and monitor. Replaced today  Code Status: DNR   DVT Prophylaxis: Lovenox  GI Prophylaxis: PPI as above   Baseline: Bed bound for last 3 months per patient report  Recommended Disposition:SNF      Subjective:     Chief Complaint / Reason for Physician Visit  \"I want to go home\". Discussed with RN events overnight. Review of Systems:  Symptom Y/N Comments  Symptom Y/N Comments   Fever/Chills    Chest Pain     Poor Appetite    Edema y    Cough    Abdominal Pain     Sputum    Joint Pain y    SOB/LEWIS    Pruritis/Rash     Nausea/vomit    Tolerating PT/OT     Diarrhea    Tolerating Diet y    Constipation    Other       Could NOT obtain due to:      Objective:     VITALS:   Last 24hrs VS reviewed since prior progress note.  Most recent are:  Patient Vitals for the past 24 hrs:   Temp Pulse Resp BP SpO2   08/24/17 1105 97.8 °F (36.6 °C) 89 18 127/66 94 %   08/24/17 0816 98 °F (36.7 °C) 88 18 144/65 96 %   08/24/17 0711 - 90 - 144/81 -   08/24/17 0335 98 °F (36.7 °C) 89 20 137/68 95 %   08/24/17 0116 98.3 °F (36.8 °C) 94 20 142/67 93 %   08/23/17 2107 - - - 113/53 -   08/23/17 1925 97.9 °F (36.6 °C) 90 17 90/46 100 %   08/23/17 1753 - - - 101/50 -   08/23/17 1505 97.8 °F (36.6 °C) 87 16 94/51 94 %   08/23/17 1318 97.6 °F (36.4 °C) 88 18 97/55 94 %       Intake/Output Summary (Last 24 hours) at 08/24/17 1144  Last data filed at 08/24/17 0805   Gross per 24 hour   Intake            53172 ml   Output             1225 ml   Net            62306 ml        PHYSICAL EXAM:  General: WD, WN.awake, no acute distress    EENT:  EOMI. Anicteric sclerae. MMM  Resp:  Coarse BS, mild crackles in bases, rhonchi  CV:  Regular  rhythm,  No edema  GI:  Soft, obese, Non tender.  +Bowel sounds  Neurologic:  Awake, oriented x2. normal speech, GCS [id number]  Psych:   Fair insight. Not anxious nor agitated  Skin:  No rashes. No jaundice    Reviewed most current lab test results and cultures  YES  Reviewed most current radiology test results   YES  Review and summation of old records today    NO  Reviewed patient's current orders and MAR    YES  PMH/SH reviewed - no change compared to H&P  ________________________________________________________________________  Care Plan discussed with:    Comments   Patient y    Family      RN y    Care Manager y    Consultant  y Ortho                    y Multidiciplinary team rounds were held today with , nursing, pharmacist and clinical coordinator. Patient's plan of care was discussed; medications were reviewed and discharge planning was addressed.      ________________________________________________________________________  Total NON critical care TIME:  20   Minutes    Total CRITICAL CARE TIME Spent:   Minutes non procedure based Comments   >50% of visit spent in counseling and coordination of care y    ________________________________________________________________________  Maryjo Figueroa MD     Procedures: see electronic medical records for all procedures/Xrays and details which were not copied into this note but were reviewed prior to creation of Plan. LABS:  I reviewed today's most current labs and imaging studies.   Pertinent labs include:  Recent Labs      08/24/17   0400  08/23/17   0308  08/22/17   0404   WBC  13.2*  13.2*  12.6*   HGB  8.2*  8.3*  8.5*   HCT  25.8*  26.0*  26.7*   PLT  231  214  207     Recent Labs      08/24/17   0400  08/23/17   0308  08/22/17   0404   NA  135*  137  139   K  3.2*  3.4*  3.4*   CL  99  103  105   CO2  31  30  32   GLU  95  101*  106*   BUN  9  12  13   CREA  0.43*  0.43*  0.46*   CA  7.4*  7.2*  7.4*   MG  1.5*  1.6  1.3*   ALB  1.6*  1.6*   --    TBILI  0.5  0.6   --    SGOT  19  22   --    ALT  11*  13   --        Signed: )Maryjo Figueroa MD

## 2017-08-24 NOTE — PROGRESS NOTES
Palliative Medicine Consult  Luca: [phone] ([phone])    Patient Name: Grant Dunlap  YOB: 1932    Date of Initial Consult: 8/23/17  Reason for Consult:  Care decisions/pain mmt? Requesting Provider: Stas Agrawal   Primary Care Physician: Conchita Simmonds, MD      SUMMARY:   Grant Dunlap is a 80 y. o. with a past history of  CAD, DM II, HTN, dyslipidemia, AAA, LBBB, GERD, and MI, and PSHx of stent placement x 2, who was admitted on 8/17/2017 from transferred from [facility] ED with a diagnosis of elevated troponin. Current medical issues leading to Palliative Medicine involvement include: severe left hip pain, PNA, canker sores 2/2 drug reaction, . PER :  9/2016-5/2017 Tyl #3 #60 q. Month Dr.Kevin Golden  5/2017-6/2017 Norco 5/325mg #30 q. 2 weeks Dr. Bimal Pope  6/5/2017 Norco 5/325mg #90/23 days Neetu Barrett  6/26/17 Norco 5/325mg #30/8 days Bimal Pope  7/5/17 percocet 5/325mg #90/23 days, Neetu Barrett  7/16/17 oxycodone 15 IR #20/4 days, Sherral Borne  7/22/17 diazepam 5mg #30/15 days Haley Fray  7/23/17 oxycodone 15 IR #30/5 days Haley Fray    24 hour opioid use: Today: 0.4mg IV Dilaudid, 15mg oxycodone  8/23/17: 1 mg IV Dilaudid  8/22/17: 1 mg IV Dilaudid    Psychosocial: lives at home with wife, daughter and DAGO; daughter and DAGO were caring for pt, however, DAGO had to have a hernia repaired so pt was moved to [facility]; DAGO is still recovering, thus, not able to assist with care. PALLIATIVE DIAGNOSES:   1. Chest pain: HCAP  2. Abdominal pain  3. Nausea and vomiting  4. Left hip pain x 3 months (chronic impacted left femoral head fracture): 8/21 hip aspiration  5. Sacral decubitus ulcer: stage 3  6. Diabetic polyneuropathy   7. Obesity   8. Severe protein calorie malnutrition   9. canker sores, MRSA pos  10. Weight loss (240 lbs on 7/19/17)  (now 227 lbs)  Not eating with canker sores x 2 weeks  11. Opioid induced constipation  12.  Gait impairment     PLAN: 1. Met with pt, wife Chaparro Townsend and dtr Naveen Saldaña; obtained history, discussed hip pain will continue to be an issue unless/until his hip is replaced, which is not going to happen anytime soon given PNA, MRSA, and failed cardiac stress tests. Counseled family that pain management will include PT/OT with emphasis on mobility, and pain medications, discussed side effects, adverse effects of drugs, risks with opioids, that opioids are not a good answer for chronic pain but at this point he does need them if we hope to get pt moving. Explained that although the longterm goal is to get pt in condition for hip replacement, pt may not do well and surgery may not take place, at which time we may need to consider Hospice. Family asked appropriate questions, agreed with plan. 2. PAIN: Left hip  1. Schedule Percocet 5/325mg q. 6 hours. Will increase frequency to q. 4 hours if necessary. 2. D/c Valium; pt is not using, also increases risk of respiratory depression. 3. Dilaudid to 0.2mg IV q. 4 hours prn breakthrough pain. Use prior to dressing changes, PT/OT. 4. Avoid long-acting opioids at this time. 5. Mobilize pt with PT/OT daily. 3. OPIOID INDUCED CONSTIPATION:  NO BM THIS ADMISSION (7 DAYS), despite taking senna and miralax. 1. Give magnesium citrate today. 2. Continue daily Miralax and Pericolace bid. 4. Initial consult note routed to primary continuity provider  5. Communicated plan of care with: Palliative IDT, RN     GOALS OF CARE / TREATMENT PREFERENCES:   [====Goals of Care====]  GOALS OF CARE:  Patient / health care proxy stated goals:     1. Optimize pain control with minimal sedation  2. Mobilize pt. 3. Rehab upon discharge. 4. Consider Hospice if pt does not improve in next few weeks.       TREATMENT PREFERENCES:   Code Status: DNR    Advance Care Planning:  Advance Care Planning 8/24/2017   Patient's Healthcare Decision Maker is: Named in scanned ACP document   Primary Decision Maker Name Chaparro Townsend Khanh   Primary Decision Maker Phone Number [phone]   Primary Decision Maker Relationship to Patient Spouse   Secondary Decision Maker Name Salima Maciel   Secondary Decision Maker Phone Number [phone]   Secondary Decision Maker Relationship to Patient Adult child   Confirm Advance Directive Yes, on file       Other:    The palliative care team has discussed with patient / health care proxy about goals of care / treatment preferences for patient.  [====Goals of Care====]         HISTORY:     History obtained from: chart    CHIEF COMPLAINT: left hip pain    HPI/SUBJECTIVE:    The patient is:   [x] Verbal and participatory  [] Non-participatory due to:     BIBA to [facility] ED with c/o intermittent dull mild diffuse abd pain x 2 weeks, intermittent CP for past few weeks that got worse on day of admission, N/V that started day of admission. W/u at [facility] revealed a stable 5.3x5 cm infrarenal AAA, distended gallbladder, mild pelvic free fluid; labs significant for WBC 13.6, AST of 104; rest of LFT's WNL; troponin was 0.32; a central line was placed. Pt reports AAA was diagnosed five years ago and is inoperable. he has history of MI and stent placement x2 and is followed by Dr. Malena Dior, cardiologist. Pt denies taking blood thinners, takes a daily baby ASA. Pt notes he was diagnosed with PNA four days ago, which is being treated with Levaquin. 8/23:   pt asleep, easily awakened, states his pain is currently 8/10, which he states is tolerable; he received 0.5mg IV dilaudid at 0500 and 11am.  Yesterday he received two doses of 0.5mg dilaudid, also had fentanyl patch 25 mcg/hr removed. 8/24:  Eating lunch; only ate 1 cup ice cream.  Not hungry, does not remember when last BM was. Denies discomfort at this time.      Clinical Pain Assessment (nonverbal scale for severity on nonverbal patients):   [++++ Clinical Pain Assessment++++]  [++++Pain Severity++++]: Pain: 7/10  [++++Pain Character++++]: sharp  [++++Pain Duration++++]:   \"a long time\"  [++++Pain Effect++++]:   Unable to mobilize  [++++Pain Factors++++]:  movement  [++++Pain Frequency++++]: with movement  [++++Pain Location++++]: left hip  [++++ Clinical Pain Assessment++++]  Duration: for how long has pt been experiencing pain (e.g., 2 days, 1 month, years)  Frequency: how often pain is an issue (e.g., several times per day, once every few days, constant)     FUNCTIONAL ASSESSMENT:     Palliative Performance Scale (PPS):  PPS: 20       PSYCHOSOCIAL/SPIRITUAL SCREENING:     Advance Care Planning:  Advance Care Planning 8/24/2017   Patient's Healthcare Decision Maker is: Named in scanned ACP document   Primary Decision Maker Name Lena Mccoy   Primary Decision Maker Phone Number [phone]   Primary Decision Maker Relationship to Patient Spouse   Secondary Decision Maker Name [address] Phone Number [phone]   Secondary Decision Maker Relationship to Patient Adult child   Confirm Advance Directive Yes, on file        Any spiritual / [employer] concerns:  [] Yes /  [x] No    Caregiver Burnout:  [] Yes /  [] No /  [x] No Caregiver Present      Anticipatory grief assessment:   [x] Normal  / [] Maladaptive       ESAS Anxiety: Anxiety: 0    ESAS Depression: Depression: 2        REVIEW OF SYSTEMS:     Positive and pertinent negative findings in ROS are noted above in HPI. The following systems were [x] reviewed / [] unable to be reviewed as noted in HPI  Other findings are noted below. Systems: constitutional, ears/nose/mouth/throat, respiratory, gastrointestinal, genitourinary, musculoskeletal, integumentary, neurologic, psychiatric, endocrine. Positive findings noted below.   Modified ESAS Completed by: provider   Fatigue: 6 Drowsiness: 0   Depression: 2 Pain: 7   Anxiety: 0 Nausea: 0   Anorexia: 9 Dyspnea: 0     Constipation: Yes              PHYSICAL EXAM:     From RN flowsheet:  Wt Readings from Last 3 Encounters:   08/24/17 229 lb 0.9 oz (103.9 kg)   08/17/17 217 lb 13 oz (98.8 kg)   08/13/17 220 lb 0.3 oz (99.8 kg)     Blood pressure 95/49, pulse 94, temperature 97.8 °F (36.6 °C), resp. rate 18, height 5' 11\" (1.803 m), weight 229 lb 0.9 oz (103.9 kg), SpO2 95 %.     Pain Scale 1: Numeric (0 - 10)  Pain Intensity 1: 3  Pain Onset 1: acute  Pain Location 1: Back, Hip  Pain Orientation 1: Left  Pain Description 1: Aching  Pain Intervention(s) 1: Distraction, Elevation, Rest, Repositioned, Medication (see MAR)  Last bowel movement, if known: PT DOES NOT REMEMBER, NO BM DOCUMENTED THIS ADMISSION    Constitutional: elderly, obese, awake, alert, oriented to self, situation  Eyes: pupils equal, anicteric  ENMT: no nasal discharge, moist mucous membranes  Cardiovascular: regular rhythm, distal pulses intact  Respiratory: breathing not labored, symmetric  Gastrointestinal: soft non-tender, +bowel sounds  Musculoskeletal: no deformity, pain with left hip passive ROM  Skin: warm, dry, stage 3 decubitus ulcer  Neurologic: following commands, moving all extremities  Psychiatric: full affect, no hallucinations         HISTORY:     Active Problems:    Coronary artery disease involving native coronary artery of native heart without angina pectoris (7/14/2017)      Essential hypertension (7/14/2017)      Abdominal pain (8/17/2017)      Elevated troponin (8/17/2017)      CAP (community acquired pneumonia) (8/19/2017)      Therapeutic opioid-induced constipation (OIC) (8/23/2017)      Chronic left hip pain (8/23/2017)      Weight loss (8/23/2017)      Abnormality of gait due to impairment of balance (8/23/2017)      Past Medical History:   Diagnosis Date    AAA (abdominal aortic aneurysm) (HCC)     Asthma     CAD (coronary artery disease)     Mitzy MPI 7/14 LVEF 45, chronic inf-lat ischemia; Echo 7/14 LVEF 45, mod LVH, mild LAE, mild AI/MR    Diabetic polyneuropathy (HCC)     DJD (degenerative joint disease)     DM II (diabetes mellitus, type II[facility]     Dyslipidemia     GERD (gastroesophageal reflux disease)     History of left bundle branch block (LBBB)     HTN (hypertension)     Myocardial infarction ([facility]     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    Pneumonia     Prostate CA ([facility] 2012    s/p XRT, hormonal      Past Surgical History:   Procedure Laterality Date    HX APPENDECTOMY      HX HEMORRHOIDECTOMY      HX PTCA      x 2 stents, vessel unknown, last 8 yrs ago      Family History   Problem Relation Age of Onset    Tuberculosis Mother     Asthma Father     Coronary Artery Disease Sister     Asthma Sister       History reviewed, no pertinent family history.   Social History   Substance Use Topics    Smoking status: Former Smoker     Types: Cigarettes     Quit date: 7/14/1997    Smokeless tobacco: Never Used    Alcohol use No     Allergies   Allergen Reactions    Contrast Agent [Iodine] Hives    Levaquin [Levofloxacin] Unknown (comments)    Sulfa (Sulfonamide Antibiotics) Unknown (comments)      Current Facility-Administered Medications   Medication Dose Route Frequency    potassium chloride SR (KLOR-CON 10) tablet 20 mEq  20 mEq Oral DAILY    magnesium oxide (MAG-OX) tablet 400 mg  400 mg Oral DAILY    acetaminophen (TYLENOL) tablet 650 mg  650 mg Oral Q4H PRN    albuterol-ipratropium (DUO-NEB) 2.5 MG-0.5 MG/3 ML  3 mL Nebulization QID RT    magnesium citrate solution 296 mL  296 mL Oral NOW    vancomycin (VANCOCIN) 1250 mg in  ml infusion  1,250 mg IntraVENous Q12H    oxyCODONE-acetaminophen (PERCOCET) 5-325 mg per tablet 1 Tab  1 Tab Oral Q6H    HYDROmorphone (PF) (DILAUDID) injection 0.2 mg  0.2 mg IntraVENous Q4H PRN    labetalol (NORMODYNE;TRANDATE) injection 10 mg  10 mg IntraVENous Q6H PRN    lactobac ac& pc-s.therm-b.anim (DULCE Q/RISAQUAD)  1 Cap Oral DAILY    ranolazine ER (RANEXA) tablet 500 mg  500 mg Oral BID    cefepime (MAXIPIME) 2 g in 0.9% sodium chloride (MBP/ADV) 100 mL  2 g IntraVENous Q8H    balsam peru-castor oil (VENELEX)  mg/gram ointment   Topical DAILY    magic mouthwash cpd (with sucralfate)  5 mL Oral TIDAC    aspirin delayed-release tablet 81 mg  81 mg Oral DAILY    atorvastatin (LIPITOR) tablet 10 mg  10 mg Oral DAILY    DULoxetine (CYMBALTA) capsule 30 mg  30 mg Oral DAILY    gabapentin (NEURONTIN) capsule 600 mg  600 mg Oral QID    isosorbide mononitrate ER (IMDUR) tablet 30 mg  30 mg Oral DAILY    levothyroxine (SYNTHROID) tablet 50 mcg  50 mcg Oral ACB    montelukast (SINGULAIR) tablet 10 mg  10 mg Oral DAILY    nitroglycerin (NITROSTAT) tablet 0.4 mg  0.4 mg SubLINGual PRN    tamsulosin (FLOMAX) capsule 0.8 mg  0.8 mg Oral DAILY    pantoprazole (PROTONIX) 40 mg in sodium chloride 0.9 % 10 mL injection  40 mg IntraVENous DAILY    ondansetron (ZOFRAN) injection 4 mg  4 mg IntraVENous Q4H PRN    sodium chloride (NS) flush 5-10 mL  5-10 mL IntraVENous Q8H    sodium chloride (NS) flush 5-10 mL  5-10 mL IntraVENous PRN    naloxone (NARCAN) injection 0.4 mg  0.4 mg IntraVENous PRN    enoxaparin (LOVENOX) injection 40 mg  40 mg SubCUTAneous DAILY    insulin lispro (HUMALOG) injection   SubCUTAneous AC&HS    glucose chewable tablet 16 g  4 Tab Oral PRN    glucagon (GLUCAGEN) injection 1 mg  1 mg IntraMUSCular PRN    dextrose 10% infusion 125-250 mL  125-250 mL IntraVENous PRN    senna-docusate (PERICOLACE) 8.6-50 mg per tablet 1 Tab  1 Tab Oral BID    polyethylene glycol (MIRALAX) packet 17 g  17 g Oral DAILY          LAB AND IMAGING FINDINGS:     Lab Results   Component Value Date/Time    WBC 13.2 08/24/2017 04:00 AM    HGB 8.2 08/24/2017 04:00 AM    PLATELET 558 39/59/2849 04:00 AM     Lab Results   Component Value Date/Time    Sodium 135 08/24/2017 04:00 AM    Potassium 3.2 08/24/2017 04:00 AM    Chloride 99 08/24/2017 04:00 AM    CO2 31 08/24/2017 04:00 AM    BUN 9 08/24/2017 04:00 AM    Creatinine 0.43 08/24/2017 04:00 AM    Calcium 7.4 08/24/2017 04:00 AM Magnesium 1.5 08/24/2017 04:00 AM      Lab Results   Component Value Date/Time    AST (SGOT) 19 08/24/2017 04:00 AM    Alk. phosphatase 80 08/24/2017 04:00 AM    Protein, total 5.2 08/24/2017 04:00 AM    Albumin 1.6 08/24/2017 04:00 AM    Globulin 3.6 08/24/2017 04:00 AM     Lab Results   Component Value Date/Time    INR 1.4 08/17/2017 02:39 PM    Prothrombin time 15.7 08/17/2017 02:39 PM    aPTT 20.4 08/17/2017 02:39 PM      No results found for: IRON, FE, TIBC, IBCT, PSAT, FERR   No results found for: PH, PCO2, PO2  No components found for: [place]   Lab Results   Component Value Date/Time    CK 23 08/17/2017 05:30 PM    CK - MB 1.8 08/17/2017 05:30 PM                Total time: 45 min  Counseling / coordination time, spent as noted above: 35 min  > 50% counseling / coordination?: yes  Prolonged service was provided for  []30 min   []75 min in face to face time in the presence of the patient, spent as noted above. Time Start:   Time End:   Note: this can only be billed with 53087 (initial) or 50693 (follow up). If multiple start / stop times, list each separately.

## 2017-08-24 NOTE — PROGRESS NOTES
Bedside and Verbal shift change report given to Huseyin (oncoming nurse) by Jay Salinas (offgoing nurse). Report included the following information SBAR, Kardex, Intake/Output and MAR.

## 2017-08-24 NOTE — PROGRESS NOTES
Nutrition Assessment:    INTERVENTIONS/RECOMMENDATIONS:   Meals/Snacks: General/healthful diet: Continue current diet. Supplements: Commercial supplement: Continue Glucerna BID, also ordered Ensure pudding for pt to try. ASSESSMENT:   Chart reviewed. Visited pt at bedside, family reported pt consumed 25-30% of lunch tray. Educated pt and family about room service and meal options. RD ordered Ensure pudding and alternate Glucerna flavor for pt to try. Diet Order: Other (comment) (Dysphagia adv. soft)  % Eaten:  Patient Vitals for the past 72 hrs:   % Diet Eaten   08/22/17 0849 40 %     Pertinent Medications: [x] Reviewed []Other:Atorvastatin, Cymbalta, Lurdes q, Miralax, Pericolace  Pertinent Labs: [x]Reviewed  []Other: [phone]  Food Allergies: [x]None []Other:     Last BM: 8/20   [x]Active     []Hyperactive  []Hypoactive       [] Absent  BS  Skin:    [x] Intact   [] Incision  [] Breakdown   []Edema   []Other:    Anthropometrics: Height: 5' 11\" (180.3 cm) Weight: 103.9 kg (229 lb 0.9 oz)    IBW (%IBW):   ( ) UBW (%UBW):   (  %)    BMI: Body mass index is 31.95 kg/(m^2). This BMI is indicative of:  []Underweight   []Normal   []Overweight   [x] Obesity   [] Extreme Obesity (BMI>40)  Last Weight Metrics:  Weight Loss Metrics 8/24/2017 8/17/2017 8/17/2017 8/17/2017 8/13/2017 7/19/2017 7/16/2017   Today's Wt 229 lb 0.9 oz - 217 lb 13 oz - 220 lb 0.3 oz 240 lb 240 lb   BMI - 31.95 kg/m2 - 30.38 kg/m2 30.69 kg/m2 33.47 kg/m2 33.47 kg/m2       Estimated Nutrition Needs (Based on): 2090 Kcals/day (CGZ8591G5. 2(AF)) , 103 g (1.0 g/kg bw) Protein  Carbohydrate:  At Least 130 g/day  Fluids: 2000 mL/day     Pt expected to meet estimated nutrient needs: [x]Yes []No    NUTRITION DIAGNOSES:   Problem:  Inadequate energy intake  Etiology: related to current medical condition     Signs/Symptoms: as evidenced by pt consuming 25-50% of meals plus <50% of ONS      NUTRITION INTERVENTIONS:  Meals/Snacks: General/healthful diet   Supplements: Commercial supplement              GOAL:   Consume >50% of meals plus ONS over next 3-5 days    NUTRITION MONITORING AND EVALUATION   Behavioral-Environmental Outcomes: Behavior  Food/Nutrient Intake Outcomes: Total energy intake  Physical Signs/Symptoms Outcomes: Weight/weight change    Previous Goal Met:   [] Met              [x] Progressing Towards Goal              [] Not Progressing Towards Goal   Previous Recommendations:   [x] Implemented          [] Not Implemented          [] Not Applicable    LEARNING NEEDS (Diet, Food/Nutrient-Drug Interaction):    [x] None Identified   [] Identified and Education Provided/Documented   [] Identified and Pt declined/was not appropriate     Cultural, Latter-day, OR Ethnic Dietary Needs:    [x] None Identified   [] Identified and Addressed     [x] Interdisciplinary Care Plan Reviewed/Documented    [x] Discharge Planning: Advance soft diet/CCD.    [] Participated in Interdisciplinary Rounds    NUTRITION RISK:    [] High              [x] Moderate           []  Low  []  Minimal/Uncompromised      Rita Neves V  Dietetic Intern

## 2017-08-24 NOTE — PROGRESS NOTES
Bedside shift change report given to Denia Varela (oncoming nurse) by Yuri Elder (offgoing nurse). Report included the following information SBAR, Procedure Summary, Intake/Output, MAR, Accordion and Recent Results.

## 2017-08-24 NOTE — PROGRESS NOTES
Patient was residing at:  [facility] and [address] [place] [address] Rd  [place], [address] [facility]  Tel [phone]  Fax [phone]  Attn:  Zeinab - Admissions Director    CM confirmed that they are presently holding a bed for the patient. Facesheet, H&P, PTOT notes faxed.     [place] LYDIA CM  Ext [phone]

## 2017-08-24 NOTE — PROGRESS NOTES
Pt's b/p 76/44, lowered HOB and rechecked b/p, 95/49. Notified Dr. Festus Tucker, received order for NS bolus and d/c lasix and coreg. Will continue to monitor pt condition.

## 2017-08-24 NOTE — PROGRESS NOTES
1200 - bedside shift report received from Gisella Gutiérrez [facility].    9772 - bedside shift report given to Gisella Gutiérrez RN.

## 2017-08-24 NOTE — PROGRESS NOTES
Ortho:  Chronic left hip pain- degenerative collapse of femoral head  No growth from hip aspirate - unlikely septic! No additional Ortho recommendations.   Will continue to follow cultures.     Will need out-pt F/U with Dr Naya Main in [place] aware and agrees with tx plan.     Kasandra Mcconnell PA-C

## 2017-08-24 NOTE — CONSULTS
[facility]  Pulmonary, Critical Care, and Sleep Medicine    Initial Patient Consult    Name: Emmanuel Hennessy MRN: [id number]   : 1932 Hospital: [facility]    Date: 2017        IMPRESSION:   · Acute respiratory failure  · Pneumonia  · Atelectasis       RECOMMENDATIONS:   · O2  · Complete 14 days course of abx  · Jet nebs  · Repeat cxr in 4 weeks  · No intervention needed at this point  · DNR     Subjective: This patient has been seen and evaluated at the request of Dr. Tato Mccurdy for pneumonia. Patient is a 80 y.o. male admitted with SHAHIDA ASDZ on cxr  Started on IV abx  Today lethargic  No acute distress      Past Medical History:   Diagnosis Date    AAA [facility]abdominal [facility] aneurysm) [facility])     Asthma     CAD (coronary artery disease)     [facility]  LVEF 45, chronic inf-lat ischemia; Echo  LVEF 45, mod LVH, mild LAE, mild AI/MR    Diabetic polyneuropathy ([facility])     DJD (degenerative joint disease)     DM II (diabetes mellitus, type II), controlled ([address]     Dyslipidemia     GERD (gastroesophageal reflux disease)     History of left bundle branch block (LBBB)     HTN (hypertension)     Myocardial infarction ([address]     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    Pneumonia     Prostate CA ([address]     s/p XRT, hormonal      Past Surgical History:   Procedure Laterality Date    HX APPENDECTOMY      HX HEMORRHOIDECTOMY      HX PTCA      x 2 stents, vessel unknown, last 10 yrs ago      Prior to Admission medications    Medication Sig Start Date End Date Taking? Authorizing Provider   DULoxetine (CYMBALTA) 30 mg capsule Take 1 Cap by mouth daily. 17  Yes Angelica Braun MD   gabapentin (NEURONTIN) 600 mg tablet Take 1 Tab by mouth four (4) times daily. 17  Yes Angelica Braun MD   glimepiride (AMARYL) 1 mg tablet Take 1 Tab by mouth every morning. 17  Yes Angelica Braun MD   potassium chloride SR (KLOR-CON 10) 10 mEq tablet Take 1 Tab by mouth daily.  17 Yes Jcarlos Jones MD   isosorbide mononitrate ER (IMDUR) 60 mg CR tablet Take 1 Tab by mouth every morning. 7/22/17  Yes Jcarlos Jones MD   atorvastatin (LIPITOR) 10 mg tablet Take 1 Tab by mouth daily. 7/22/17  Yes Jcarlos Jones MD   levothyroxine (SYNTHROID) 50 mcg tablet Take  by mouth Daily (before breakfast). Yes Historical Provider   metFORMIN (GLUCOPHAGE) 500 mg tablet Take  by mouth two (2) times daily (with meals). Yes Historical Provider   fluticasone-vilanterol (BREO ELLIPTA) 100-25 mcg/dose inhaler Take 1 Puff by inhalation daily. Yes Historical Provider   carvedilol (COREG) 3.125 mg tablet Take  by mouth two (2) times daily (with meals). Yes Historical Provider   montelukast (SINGULAIR) 10 mg tablet Take 10 mg by mouth daily. Yes Historical Provider   aspirin delayed-release 81 mg tablet Take  by mouth daily. Yes Historical Provider   oxyCODONE-acetaminophen (PERCOCET) 5-325 mg per tablet Take 1 Tab by mouth every four (4) hours as needed for Pain. Armando Benitez MD   predniSONE (STERAPRED DS) 10 mg dose pack 6 pills daily for 3 days, then 4 pills daily for 3 days, then 2 pills daily for 3 days, then 1 pill daily for 3 days 8/13/17   Tanika Rahman MD   nitroglycerin (NITROSTAT) 0.4 mg SL tablet 1 Tab by SubLINGual route as needed for Chest Pain. 7/22/17   Jcarlos Jones MD   oxyCODONE-acetaminophen (PERCOCET) 7.5-325 mg per tablet Take 1 Tab by mouth Before breakfast, lunch, dinner and at bedtime. Max Daily Amount: 4 Tabs. 7/22/17   Jcarlos Jones MD   oxyCODONE IR (OXY-IR) 15 mg immediate release tablet Take 1 Tab by mouth every four (4) hours as needed for Pain. Max Daily Amount: 90 mg. 7/22/17   Jcarlos Jones MD   diazePAM (VALIUM) 5 mg tablet Take 1 Tab by mouth every twelve (12) hours as needed for Anxiety (muscle spasm). Max Daily Amount: 10 mg. 7/22/17   Jcarlos Jones MD   tamsulosin [facility]) 0.4 mg capsule Take 0.8 mg by mouth daily.     Historical Provider   diclofenac EC (VOLTAREN) 50 mg EC tablet Take  by mouth two (2) times a day.     Historical Provider     Allergies   Allergen Reactions    Contrast Agent [Iodine] Hives    Levaquin [Levofloxacin] Unknown (comments)    Sulfa (Sulfonamide Antibiotics) Unknown (comments)      Social History   Substance Use Topics    Smoking status: Former Smoker     Types: Cigarettes     Quit date: 7/14/1997    Smokeless tobacco: Never Used    Alcohol use No      Family History   Problem Relation Age of Onset    Tuberculosis Mother     Asthma Father     Coronary Artery Disease Sister     Asthma Sister         Current Facility-Administered Medications   Medication Dose Route Frequency    potassium chloride SR (KLOR-CON 10) tablet 20 mEq  20 mEq Oral DAILY    magnesium oxide (MAG-OX) tablet 400 mg  400 mg Oral DAILY    sodium chloride 0.9 % bolus infusion 250 mL  250 mL IntraVENous ONCE    albuterol-ipratropium (DUO-NEB) 2.5 MG-0.5 MG/3 ML  3 mL Nebulization QID RT    vancomycin (VANCOCIN) 1250 mg in  ml infusion  1,250 mg IntraVENous Q12H    oxyCODONE-acetaminophen (PERCOCET) 5-325 mg per tablet 1 Tab  1 Tab Oral Q6H    lactobac ac& pc-s.therm-b.anim (DULCE Q/RISAQUAD)  1 Cap Oral DAILY    ranolazine ER (RANEXA) tablet 500 mg  500 mg Oral BID    cefepime (MAXIPIME) 2 g in 0.9% sodium chloride (MBP/ADV) 100 mL  2 g IntraVENous Q8H    balsam peru-castor oil (VENELEX)  mg/gram ointment   Topical DAILY    magic mouthwash cpd (with sucralfate)  5 mL Oral TIDAC    aspirin delayed-release tablet 81 mg  81 mg Oral DAILY    atorvastatin (LIPITOR) tablet 10 mg  10 mg Oral DAILY    DULoxetine (CYMBALTA) capsule 30 mg  30 mg Oral DAILY    gabapentin (NEURONTIN) capsule 600 mg  600 mg Oral QID    isosorbide mononitrate ER (IMDUR) tablet 30 mg  30 mg Oral DAILY    levothyroxine (SYNTHROID) tablet 50 mcg  50 mcg Oral ACB    montelukast (SINGULAIR) tablet 10 mg  10 mg Oral DAILY    tamsulosin (FLOMAX) capsule 0.8 mg  0.8 mg Oral DAILY    pantoprazole (PROTONIX) 40 mg in sodium chloride 0.9 % 10 mL injection  40 mg IntraVENous DAILY    sodium chloride (NS) flush 5-10 mL  5-10 mL IntraVENous Q8H    enoxaparin (LOVENOX) injection 40 mg  40 mg SubCUTAneous DAILY    insulin lispro (HUMALOG) injection   SubCUTAneous AC&HS    senna-docusate (PERICOLACE) 8.6-50 mg per tablet 1 Tab  1 Tab Oral BID    polyethylene glycol (MIRALAX) packet 17 g  17 g Oral DAILY       Review of Systems:  Review of systems not obtained due to patient factors. Objective:   Vital Signs:    Visit Vitals    BP 95/49 (BP 1 Location: Left arm)    Pulse 94    Temp 97.8 °F (36.6 °C)    Resp 18    Wt 103.9 kg (229 lb)    SpO2 96%    BMI 31.94 kg/m2       O2 Device: Nasal cannula   O2 Flow Rate (L/min): 3 l/min   Temp (24hrs), Av °F (36.7 °C), Min:97.8 °F (36.6 °C), Max:98.3 °F (36.8 °C)       Intake/Output:   Last shift:      701 - 1900  In: 94453 [P.O.:57780]  Out: 500 [Urine:500]  Last 3 shifts: 1901 -  0700  In: 1150 [I.V.:1150]  Out: 1950 [Urine:1950]    Intake/Output Summary (Last 24 hours) at 17 1419  Last data filed at 17 1130   Gross per 24 hour   Intake            54276 ml   Output             1425 ml   Net            92963 ml      Physical Exam:   General:  Lethargic, no distress, appears stated age. Head:  Normocephalic, without obvious abnormality, atraumatic. Eyes:  Conjunctivae/corneas clear. PERRL, EOMs intact. Nose: Nares normal. Septum midline. Mucosa normal. No drainage or sinus tenderness. Throat: Lips, mucosa, and tongue normal. Teeth and gums normal.   Neck: Supple, symmetrical, trachea midline, no adenopathy, thyroid: no enlargment/tenderness/nodules, no carotid bruit and no JVD. Back:   Symmetric, no curvature. ROM normal.   Lungs:   Clear to auscultation bilaterally. Chest wall:  No tenderness or deformity.    Heart:  Regular rate and rhythm, S1, S2 normal, no murmur, click, rub or gallop. Abdomen:   Soft, non-tender. Bowel sounds normal. No masses,  No organomegaly. Extremities: Extremities normal, atraumatic, no cyanosis or edema. Pulses: 2+ and symmetric all extremities. Skin: Skin color, texture, turgor normal. No rashes or lesions   Lymph nodes: Cervical, supraclavicular, and axillary nodes normal.   Neurologic: Lethargic      Data review:     Recent Results (from the past 24 hour(s))   GLUCOSE, POC    Collection Time: 08/23/17  4:02 PM   Result Value Ref Range    Glucose (POC) 121 (H) 65 - 100 mg/dL    Performed by Ana Adkins    GLUCOSE, POC    Collection Time: 08/23/17  8:19 PM   Result Value Ref Range    Glucose (POC) 122 (H) 65 - 100 mg/dL    Performed by Heidy Cedillo    CBC WITH AUTOMATED DIFF    Collection Time: 08/24/17  4:00 AM   Result Value Ref Range    WBC 13.2 (H) 4.1 - 11.1 K/uL    RBC 2.60 (L) 4.10 - 5.70 M/uL    HGB 8.2 (L) 12.1 - 17.0 g/dL    HCT 25.8 (L) 36.6 - 50.3 %    MCV 99.2 (H) 80.0 - 99.0 FL    MCH 31.5 26.0 - 34.0 PG    MCHC 31.8 30.0 - 36.5 g/dL    RDW 14.7 (H) 11.5 - 14.5 %    PLATELET 810 290 - 581 K/uL    NEUTROPHILS 81 (H) 32 - 75 %    LYMPHOCYTES 10 (L) 12 - 49 %    MONOCYTES 7 5 - 13 %    EOSINOPHILS 2 0 - 7 %    BASOPHILS 0 0 - 1 %    ABS. NEUTROPHILS 10.6 (H) 1.8 - 8.0 K/UL    ABS. LYMPHOCYTES 1.4 0.8 - 3.5 K/UL    ABS. MONOCYTES 1.0 0.0 - 1.0 K/UL    ABS. EOSINOPHILS 0.3 0.0 - 0.4 K/UL    ABS.  BASOPHILS 0.0 0.0 - 0.1 K/UL   MAGNESIUM    Collection Time: 08/24/17  4:00 AM   Result Value Ref Range    Magnesium 1.5 (L) 1.6 - 2.4 mg/dL   METABOLIC PANEL, COMPREHENSIVE    Collection Time: 08/24/17  4:00 AM   Result Value Ref Range    Sodium 135 (L) 136 - 145 mmol/L    Potassium 3.2 (L) 3.5 - 5.1 mmol/L    Chloride 99 97 - 108 mmol/L    CO2 31 21 - 32 mmol/L    Anion gap 5 5 - 15 mmol/L    Glucose 95 65 - 100 mg/dL    BUN 9 6 - 20 MG/DL    Creatinine 0.43 (L) 0.70 - 1.30 MG/DL    BUN/Creatinine ratio 21 (H) 12 - 20      GFR est AA >60 >60 ml/min/1.73m2    GFR est non-AA >60 >60 ml/min/1.73m2    Calcium 7.4 (L) 8.5 - 10.1 MG/DL    Bilirubin, total 0.5 0.2 - 1.0 MG/DL    ALT (SGPT) 11 (L) 12 - 78 U/L    AST (SGOT) 19 15 - 37 U/L    Alk.  phosphatase 80 45 - 117 U/L    Protein, total 5.2 (L) 6.4 - 8.2 g/dL    Albumin 1.6 (L) 3.5 - 5.0 g/dL    Globulin 3.6 2.0 - 4.0 g/dL    A-G Ratio 0.4 (L) 1.1 - 2.2     GLUCOSE, POC    Collection Time: 08/24/17  7:37 AM   Result Value Ref Range    Glucose (POC) 118 (H) 65 - 100 mg/dL    Performed by Odalis Rockwell, POC    Collection Time: 08/24/17 11:15 AM   Result Value Ref Range    Glucose (POC) 115 (H) 65 - 100 mg/dL    Performed by Nelida Duncan        Imaging:  I have personally reviewed the patients radiographs and have reviewed the reports:  CXR: SHAHIDA ASDZ vs ATX unchanged since 8/17, change due to rotated image        Raul Virk MD

## 2017-08-24 NOTE — PROGRESS NOTES
Janes spoke with Ms. Jesus Galvan - preferred contact #  [phone]    Family is presently holding a room at [facility] in [place], [place]. They would like to know anticipated length of stay with this admission. MD informed.     Lazaro Jessica, RN CM  Ext [phone]

## 2017-08-25 ENCOUNTER — HOSPICE ADMISSION (OUTPATIENT)
Dept: HOSPICE | Facility: HOSPICE | Age: 82
End: 2017-08-25

## 2017-08-25 LAB
ALBUMIN SERPL-MCNC: 1.5 G/DL (ref 3.5–5)
ALBUMIN/GLOB SERPL: 0.4 {RATIO} (ref 1.1–2.2)
ALP SERPL-CCNC: 78 U/L (ref 45–117)
ALT SERPL-CCNC: 9 U/L (ref 12–78)
ANION GAP SERPL CALC-SCNC: 2 MMOL/L (ref 5–15)
AST SERPL-CCNC: 15 U/L (ref 15–37)
BASOPHILS # BLD: 0 K/UL (ref 0–0.1)
BASOPHILS NFR BLD: 0 % (ref 0–1)
BILIRUB SERPL-MCNC: 0.4 MG/DL (ref 0.2–1)
BUN SERPL-MCNC: 8 MG/DL (ref 6–20)
BUN/CREAT SERPL: 18 (ref 12–20)
CALCIUM SERPL-MCNC: 7.1 MG/DL (ref 8.5–10.1)
CHLORIDE SERPL-SCNC: 98 MMOL/L (ref 97–108)
CO2 SERPL-SCNC: 34 MMOL/L (ref 21–32)
CREAT SERPL-MCNC: 0.44 MG/DL (ref 0.7–1.3)
EOSINOPHIL # BLD: 0.3 K/UL (ref 0–0.4)
EOSINOPHIL NFR BLD: 2 % (ref 0–7)
ERYTHROCYTE [DISTWIDTH] IN BLOOD BY AUTOMATED COUNT: 14.5 % (ref 11.5–14.5)
GLOBULIN SER CALC-MCNC: 3.6 G/DL (ref 2–4)
GLUCOSE BLD STRIP.AUTO-MCNC: 115 MG/DL (ref 65–100)
GLUCOSE BLD STRIP.AUTO-MCNC: 140 MG/DL (ref 65–100)
GLUCOSE BLD STRIP.AUTO-MCNC: 144 MG/DL (ref 65–100)
GLUCOSE BLD STRIP.AUTO-MCNC: 154 MG/DL (ref 65–100)
GLUCOSE SERPL-MCNC: 112 MG/DL (ref 65–100)
HCT VFR BLD AUTO: 25.3 % (ref 36.6–50.3)
HGB BLD-MCNC: 8.2 G/DL (ref 12.1–17)
LYMPHOCYTES # BLD: 1.1 K/UL (ref 0.8–3.5)
LYMPHOCYTES NFR BLD: 10 % (ref 12–49)
MAGNESIUM SERPL-MCNC: 1.6 MG/DL (ref 1.6–2.4)
MCH RBC QN AUTO: 31.9 PG (ref 26–34)
MCHC RBC AUTO-ENTMCNC: 32.4 G/DL (ref 30–36.5)
MCV RBC AUTO: 98.4 FL (ref 80–99)
MONOCYTES # BLD: 1 K/UL (ref 0–1)
MONOCYTES NFR BLD: 9 % (ref 5–13)
NEUTS SEG # BLD: 8.3 K/UL (ref 1.8–8)
NEUTS SEG NFR BLD: 79 % (ref 32–75)
PLATELET # BLD AUTO: 230 K/UL (ref 150–400)
POTASSIUM SERPL-SCNC: 3.6 MMOL/L (ref 3.5–5.1)
PROT SERPL-MCNC: 5.1 G/DL (ref 6.4–8.2)
RBC # BLD AUTO: 2.57 M/UL (ref 4.1–5.7)
SERVICE CMNT-IMP: ABNORMAL
SODIUM SERPL-SCNC: 134 MMOL/L (ref 136–145)
WBC # BLD AUTO: 10.6 K/UL (ref 4.1–11.1)

## 2017-08-25 PROCEDURE — 74011250637 HC RX REV CODE- 250/637: Performed by: NURSE PRACTITIONER

## 2017-08-25 PROCEDURE — 82962 GLUCOSE BLOOD TEST: CPT

## 2017-08-25 PROCEDURE — C9113 INJ PANTOPRAZOLE SODIUM, VIA: HCPCS | Performed by: INTERNAL MEDICINE

## 2017-08-25 PROCEDURE — 77010033678 HC OXYGEN DAILY

## 2017-08-25 PROCEDURE — 74011636637 HC RX REV CODE- 636/637: Performed by: INTERNAL MEDICINE

## 2017-08-25 PROCEDURE — 74011000250 HC RX REV CODE- 250: Performed by: INTERNAL MEDICINE

## 2017-08-25 PROCEDURE — 80202 ASSAY OF VANCOMYCIN: CPT | Performed by: INTERNAL MEDICINE

## 2017-08-25 PROCEDURE — 65660000000 HC RM CCU STEPDOWN

## 2017-08-25 PROCEDURE — 80053 COMPREHEN METABOLIC PANEL: CPT | Performed by: INTERNAL MEDICINE

## 2017-08-25 PROCEDURE — 74011250636 HC RX REV CODE- 250/636: Performed by: INTERNAL MEDICINE

## 2017-08-25 PROCEDURE — 36415 COLL VENOUS BLD VENIPUNCTURE: CPT | Performed by: INTERNAL MEDICINE

## 2017-08-25 PROCEDURE — 94640 AIRWAY INHALATION TREATMENT: CPT

## 2017-08-25 PROCEDURE — 74011000258 HC RX REV CODE- 258: Performed by: NURSE PRACTITIONER

## 2017-08-25 PROCEDURE — 74011250636 HC RX REV CODE- 250/636: Performed by: NURSE PRACTITIONER

## 2017-08-25 PROCEDURE — 83735 ASSAY OF MAGNESIUM: CPT | Performed by: INTERNAL MEDICINE

## 2017-08-25 PROCEDURE — 74011250637 HC RX REV CODE- 250/637: Performed by: INTERNAL MEDICINE

## 2017-08-25 PROCEDURE — 85025 COMPLETE CBC W/AUTO DIFF WBC: CPT | Performed by: INTERNAL MEDICINE

## 2017-08-25 RX ORDER — IPRATROPIUM BROMIDE AND ALBUTEROL SULFATE 2.5; .5 MG/3ML; MG/3ML
3 SOLUTION RESPIRATORY (INHALATION)
Status: DISCONTINUED | OUTPATIENT
Start: 2017-08-25 | End: 2017-08-27

## 2017-08-25 RX ORDER — MIDODRINE HYDROCHLORIDE 5 MG/1
5 TABLET ORAL
Status: DISCONTINUED | OUTPATIENT
Start: 2017-08-25 | End: 2017-08-30 | Stop reason: HOSPADM

## 2017-08-25 RX ADMIN — ISOSORBIDE MONONITRATE 30 MG: 30 TABLET, EXTENDED RELEASE ORAL at 10:15

## 2017-08-25 RX ADMIN — Medication 10 ML: at 21:56

## 2017-08-25 RX ADMIN — Medication 10 ML: at 14:07

## 2017-08-25 RX ADMIN — GABAPENTIN 600 MG: 300 CAPSULE ORAL at 09:11

## 2017-08-25 RX ADMIN — OXYCODONE HYDROCHLORIDE AND ACETAMINOPHEN 1 TABLET: 5; 325 TABLET ORAL at 12:24

## 2017-08-25 RX ADMIN — CEFEPIME HYDROCHLORIDE 2 G: 2 INJECTION, POWDER, FOR SOLUTION INTRAVENOUS at 02:28

## 2017-08-25 RX ADMIN — SODIUM CHLORIDE 40 MG: 9 INJECTION INTRAMUSCULAR; INTRAVENOUS; SUBCUTANEOUS at 09:11

## 2017-08-25 RX ADMIN — MIDODRINE HYDROCHLORIDE 5 MG: 5 TABLET ORAL at 18:31

## 2017-08-25 RX ADMIN — GABAPENTIN 600 MG: 300 CAPSULE ORAL at 17:12

## 2017-08-25 RX ADMIN — OXYCODONE HYDROCHLORIDE AND ACETAMINOPHEN 1 TABLET: 5; 325 TABLET ORAL at 05:30

## 2017-08-25 RX ADMIN — ENOXAPARIN SODIUM 40 MG: 40 INJECTION SUBCUTANEOUS at 09:11

## 2017-08-25 RX ADMIN — IPRATROPIUM BROMIDE AND ALBUTEROL SULFATE 3 ML: .5; 3 SOLUTION RESPIRATORY (INHALATION) at 19:44

## 2017-08-25 RX ADMIN — POLYETHYLENE GLYCOL 3350 17 G: 17 POWDER, FOR SOLUTION ORAL at 09:11

## 2017-08-25 RX ADMIN — LIDOCAINE HYDROCHLORIDE 5 ML: 20 SOLUTION ORAL; TOPICAL at 17:25

## 2017-08-25 RX ADMIN — DOCUSATE SODIUM AND SENNOSIDES 1 TABLET: 8.6; 5 TABLET, FILM COATED ORAL at 09:11

## 2017-08-25 RX ADMIN — SODIUM CHLORIDE 250 ML: 900 INJECTION, SOLUTION INTRAVENOUS at 18:35

## 2017-08-25 RX ADMIN — GABAPENTIN 600 MG: 300 CAPSULE ORAL at 21:57

## 2017-08-25 RX ADMIN — CASTOR OIL AND BALSAM, PERU: 788; 87 OINTMENT TOPICAL at 10:21

## 2017-08-25 RX ADMIN — POTASSIUM CHLORIDE 20 MEQ: 750 TABLET, FILM COATED, EXTENDED RELEASE ORAL at 09:11

## 2017-08-25 RX ADMIN — IPRATROPIUM BROMIDE AND ALBUTEROL SULFATE 3 ML: .5; 3 SOLUTION RESPIRATORY (INHALATION) at 07:59

## 2017-08-25 RX ADMIN — SODIUM CHLORIDE 250 ML: 900 INJECTION, SOLUTION INTRAVENOUS at 19:00

## 2017-08-25 RX ADMIN — TAMSULOSIN HYDROCHLORIDE 0.8 MG: 0.4 CAPSULE ORAL at 09:11

## 2017-08-25 RX ADMIN — HYDROMORPHONE HYDROCHLORIDE 0.2 MG: 1 INJECTION, SOLUTION INTRAMUSCULAR; INTRAVENOUS; SUBCUTANEOUS at 10:19

## 2017-08-25 RX ADMIN — Medication 10 ML: at 05:26

## 2017-08-25 RX ADMIN — LIDOCAINE HYDROCHLORIDE 5 ML: 20 SOLUTION ORAL; TOPICAL at 12:24

## 2017-08-25 RX ADMIN — DULOXETINE HYDROCHLORIDE 30 MG: 30 CAPSULE, DELAYED RELEASE ORAL at 09:11

## 2017-08-25 RX ADMIN — SODIUM CHLORIDE 250 ML: 900 INJECTION, SOLUTION INTRAVENOUS at 17:13

## 2017-08-25 RX ADMIN — CEFEPIME HYDROCHLORIDE 2 G: 2 INJECTION, POWDER, FOR SOLUTION INTRAVENOUS at 10:05

## 2017-08-25 RX ADMIN — LEVOTHYROXINE SODIUM 50 MCG: 50 TABLET ORAL at 07:40

## 2017-08-25 RX ADMIN — OXYCODONE HYDROCHLORIDE AND ACETAMINOPHEN 1 TABLET: 5; 325 TABLET ORAL at 17:12

## 2017-08-25 RX ADMIN — VANCOMYCIN HYDROCHLORIDE 1250 MG: 10 INJECTION, POWDER, LYOPHILIZED, FOR SOLUTION INTRAVENOUS at 12:30

## 2017-08-25 RX ADMIN — MONTELUKAST SODIUM 10 MG: 10 TABLET, FILM COATED ORAL at 09:11

## 2017-08-25 RX ADMIN — ATORVASTATIN CALCIUM 10 MG: 10 TABLET, FILM COATED ORAL at 09:11

## 2017-08-25 RX ADMIN — CEFEPIME HYDROCHLORIDE 2 G: 2 INJECTION, POWDER, FOR SOLUTION INTRAVENOUS at 17:12

## 2017-08-25 RX ADMIN — GABAPENTIN 600 MG: 300 CAPSULE ORAL at 14:07

## 2017-08-25 RX ADMIN — Medication 400 MG: at 09:11

## 2017-08-25 RX ADMIN — ASPIRIN 81 MG: 81 TABLET, COATED ORAL at 09:11

## 2017-08-25 RX ADMIN — IPRATROPIUM BROMIDE AND ALBUTEROL SULFATE 3 ML: .5; 3 SOLUTION RESPIRATORY (INHALATION) at 14:42

## 2017-08-25 RX ADMIN — DOCUSATE SODIUM AND SENNOSIDES 1 TABLET: 8.6; 5 TABLET, FILM COATED ORAL at 17:12

## 2017-08-25 RX ADMIN — RANOLAZINE 500 MG: 500 TABLET, FILM COATED, EXTENDED RELEASE ORAL at 09:11

## 2017-08-25 RX ADMIN — INSULIN LISPRO 2 UNITS: 100 INJECTION, SOLUTION INTRAVENOUS; SUBCUTANEOUS at 21:56

## 2017-08-25 RX ADMIN — INSULIN LISPRO 2 UNITS: 100 INJECTION, SOLUTION INTRAVENOUS; SUBCUTANEOUS at 17:21

## 2017-08-25 RX ADMIN — RANOLAZINE 500 MG: 500 TABLET, FILM COATED, EXTENDED RELEASE ORAL at 17:12

## 2017-08-25 RX ADMIN — LIDOCAINE HYDROCHLORIDE 5 ML: 20 SOLUTION ORAL; TOPICAL at 08:30

## 2017-08-25 RX ADMIN — Medication 1 CAPSULE: at 09:11

## 2017-08-25 NOTE — PROGRESS NOTES
CM acknowledged consult for Hospice Info Session. Referral submitted to [facility] via [employer].     [facility]  Ext [phone]

## 2017-08-25 NOTE — PROGRESS NOTES
Hospitalist Progress Note    NAME: Sudhir Braswell   :  1932   MRN:  [id number]       Interim Hospital Summary: 80 y.o. male whom presented on 2017 with      Assessment / Plan:  HCAP  - CXR revaled Left upper lobe atelectasis/consolidation. - continue with IV Cefepime and Vanc (8/10). Pt developed severe allergic reaction after taking levofloxacin which resulted severe canker sores. - O2 Sat 96% at 2L of O2 via n/c; wean O2 as tolerate  CXR still showing mass like atelectatic area in left lung will try mucomyst nebs and get Pulmonology advice. Canker sores  - magic mouth wash; asked nursing staff to help with oral care using sponges or swish and spit when pt is able. Resolved  Chest Pain  Elevated Troponin  History of CAD s/p remote PCI  Stress Test negative on 17  Hyperlipidemia'  Chronic Systolic Heart Failure: (may be end stage Cardiomyopathy)  - discussed with the family and pt yesterday in regards of code status and goals of care. Patient and the family requested continue with current care, do not wish to have any invasive procedures.  Pt and the family asked for optimal pain control. Will consult palliative team to follow. - no further chest pain since we added Ranexa   - EKG with TWI in V1-V5  - troponin went up to 0.55 from 0.32  - cardiology following; communicated with cardiology team on pt's wish. Conservative medical management. Cardiology team will follow as need basis  - Echo: Ejection fraction was estimated in the range of 15 % to 20 %  - continue ASA, BB, Ranexa, & IMDUR  - continue statin, CXR not in pulmonary edema, not able to tolerate diuretics due to low BP. Hypotension: developed hypotension again once resuming BP meds, will continue to hold. Abdominal Pain: now resolved  Nausea/vomiting  - CT Abdomen/Pelvis: Distended gallbladder. Infrarenal abdominal aortic aneurysm measures 5.3 x 5 cm (no changes from previous images), Colonic diverticulosis.   - Abdominal ultrasound:Sludge layers dependently in the nondistended gallbladder. No wall thickening or pericholecystic fluid.    - Avoid NSAID  - IV PPI & IV antiemetics  - stop diclofenac  Chronic Impacted Left Femoral Head Fracture: ideally needs total hip replacement to control pain  - D/c  fentanyl patch  May be contributing to hypotension and excessive sedation  - scheduled Acetaminophen  - prn Oxycodone or dilaudid, valium as need for severe muscle spasm  - Dr. Teresita Burris spoke with Dr. Bee Boyle in Radiology to get addendum added on to patient CT A/P which was preformed at [facility] detailing patient's left hip pathology  - ortho following; pain management. IR for aspiration to r/o septic joint  Sacral Decub:  - consult wound care; STAGE 4 decubitus ulcer  Type 2 diabetes mellitus without complication, without long-term current use of insulin   Diabetic polyneuropathy  - hold home oral antihyperglycemics and place on SSI  - Hgb A1c 6.6  - continue home Gabapentin  Abdominal aortic aneurysm (AAA)   - stable on today's CT:Infrarenal abdominal aortic aneurysm measures 5.3 x 5 cm  Obesity  Severe Protein Calorie Malnutrition  -consult Nutrition  Hypokalemia: replace and monitor. Replaced today  Hypomagnesemia: replace and monitor. Replaced today  Code Status: DNR   DVT Prophylaxis: Lovenox  GI Prophylaxis: PPI as above   Baseline: Bed bound for last 3 months per patient report  Recommended [id number] Emida team will meet with family tomorrow for info session     Subjective:     Chief Complaint / Reason for Physician Visit  \"I am OK\". Discussed with RN events overnight.      Review of Systems:  Symptom Y/N Comments  Symptom Y/N Comments   Fever/Chills    Chest Pain     Poor Appetite    Edema y    Cough    Abdominal Pain     Sputum    Joint Pain y    SOB/LEWIS    Pruritis/Rash     Nausea/vomit    Tolerating PT/OT     Diarrhea    Tolerating Diet y    Constipation    Other       Could NOT obtain due to:      Objective:     VITALS: Last 24hrs VS reviewed since prior progress note. Most recent are:  Patient Vitals for the past 24 hrs:   Temp Pulse Resp BP SpO2   08/25/17 1057 97.8 °F (36.6 °C) 80 20 114/58 98 %   08/25/17 0759 - - - - 98 %   08/25/17 0733 97.8 °F (36.6 °C) 81 20 117/65 98 %   08/25/17 0231 97.5 °F (36.4 °C) 78 20 115/60 97 %   08/24/17 2318 98.1 °F (36.7 °C) 79 18 102/53 97 %   08/24/17 1958 - - - - 96 %   08/24/17 1925 98 °F (36.7 °C) 84 20 114/67 98 %   08/24/17 1708 - - - 102/62 -   08/24/17 1511 98 °F (36.7 °C) 90 16 94/50 95 %   08/24/17 1506 - - - - 95 %   08/24/17 1355 - 94 - 95/49 96 %   08/24/17 1348 - 94 - (!) 76/44 95 %       Intake/Output Summary (Last 24 hours) at 08/25/17 1340  Last data filed at 08/25/17 1057   Gross per 24 hour   Intake              900 ml   Output             2550 ml   Net            -1650 ml        PHYSICAL EXAM:  General: WD, WN.awake, no acute distress    EENT:  EOMI. Anicteric sclerae. MMM  Resp:  Coarse BS, mild crackles in bases, rhonchi  CV:  Regular  rhythm,  No edema  GI:  Soft, obese, Non tender.  +Bowel sounds  Neurologic:  Awake, oriented x2. normal speech, GCS [id number]  Psych:   Fair insight. Not anxious nor agitated  Skin:  No rashes. No jaundice    Reviewed most current lab test results and cultures  YES  Reviewed most current radiology test results   YES  Review and summation of old records today    NO  Reviewed patient's current orders and MAR    YES  PMH/SH reviewed - no change compared to H&P  ________________________________________________________________________  Care Plan discussed with:    Comments   Patient y    Family      RN y    Care Manager y    Consultant  y Ortho                    y Multidiciplinary team rounds were held today with , nursing, pharmacist and clinical coordinator. Patient's plan of care was discussed; medications were reviewed and discharge planning was addressed. ________________________________________________________________________  Total NON critical care TIME:  20   Minutes    Total CRITICAL CARE TIME Spent:   Minutes non procedure based      Comments   >50% of visit spent in counseling and coordination of care y    ________________________________________________________________________  Danielle [place] MD     Procedures: see electronic medical records for all procedures/Xrays and details which were not copied into this note but were reviewed prior to creation of Plan. LABS:  I reviewed today's most current labs and imaging studies.   Pertinent labs include:  Recent Labs      08/25/17   0241 08/24/17   0400  08/23/17   0308   WBC  10.6  13.2*  13.2*   HGB  8.2*  8.2*  8.3*   HCT  25.3*  25.8*  26.0*   PLT  230  231  214     Recent Labs      08/25/17   0241 08/24/17   0400  08/23/17   0308   NA  134*  135*  137   K  3.6  3.2*  3.4*   CL  98  99  103   CO2  34*  31  30   GLU  112*  95  101*   BUN  8  9  12   CREA  0.44*  0.43*  0.43*   CA  7.1*  7.4*  7.2*   MG  1.6  1.5*  1.6   ALB  1.5*  1.6*  1.6*   TBILI  0.4  0.5  0.6   SGOT  15  19  22   ALT  9*  11*  13       Signed: )Danielle [place] MD

## 2017-08-25 NOTE — PROGRESS NOTES
PCU SHIFT NURSING NOTE      Bedside and Verbal shift change report given to Orville Hartmann RN (oncoming nurse) by Josiah Lincoln RN (offgoing nurse). Report included the following information SBAR, Kardex, ED Summary, Procedure Summary, Intake/Output, MAR, Recent Results, Med Rec Status, Cardiac Rhythm NSR and Alarm Parameters . Shift Summary:         Admission Date 8/17/2017   Admission Diagnosis Abdominal pain  Elevated troponin   Consults IP CONSULT TO CARDIOLOGY  IP CONSULT TO ORTHOPEDIC SURGERY  IP CONSULT TO PALLIATIVE CARE - PROVIDER  IP CONSULT TO PULMONOLOGY        Consults   [x]PT   [x]OT   []Speech   [x]Case Management      [] Palliative      Cardiac Monitoring Order   [x]Yes   []No     IV drips   []Yes    Drip:                            Dose:  Drip:                            Dose:  Drip:                            Dose:   [x]No     GI Prophylaxis   [x]Yes   []No         DVT Prophylaxis   SCDs:             Jesus stockings:         [x] Medication   []Contraindicated   []None      Activity Level Activity Level: Bed Rest     Activity Assistance: Partial (one person)   Purposeful Rounding every 1-2 hour? [x]Yes   Miller Score  Total Score: 3   Bed Alarm (If score 3 or >)   [x]Yes   [] Refused (See signed refusal form in chart)   Kwan Score  Kwan Score: 14   Kwan Score (if score 14 or less)   [x]PMT consult   [x]Wound Care consult      [x]Specialty bed   [x] Nutrition consult          Needs prior to discharge:   Home O2 required:    []Yes   [x]No    If yes, how much O2 required?     Other:    Last Bowel Movement: Last Bowel Movement Date: 08/20/17      Influenza Vaccine Received Flu Vaccine for Current Season (usually Sept-March): Not Flu Season        Pneumonia Vaccine           Diet Active Orders   Diet    DIET DYSPHAGIA ADVANCED SOFT (NDD3)      LDAs           Triple Lumen 08/17/17 Right;Upper Subclavian (Active)   Central Line Being Utilized Yes 8/25/2017  7:33 AM   Criteria for Appropriate Use Limited/no vessel suitable for conventional peripheral access 8/25/2017  7:33 AM   Site Assessment Clean, dry, & intact 8/25/2017  7:33 AM   Infiltration Assessment 0 8/25/2017  7:33 AM   Affected Extremity/Extremities Color distal to insertion site pink (or appropriate for race) 8/25/2017  7:33 AM   Date of Last Dressing Change 08/24/17 8/25/2017  7:33 AM   Dressing Status Clean, dry, & intact 8/25/2017  7:33 AM   Dressing Type Disk with Chlorhexadine gluconate (CHG); Transparent 8/25/2017  7:33 AM   Action Taken Open ports on tubing capped 8/25/2017  7:33 AM   Proximal Hub Color/Line Status Earmon Rom; Infusing;Flushed 8/25/2017  7:33 AM   Positive Blood Return (Medial Site) Yes 8/25/2017  7:33 AM   Medial Hub Color/Line Status Blue;Capped;Flushed 8/25/2017  7:33 AM   Positive Blood Return (Lateral Site) Yes 8/25/2017  7:33 AM   Distal Hub Color/Line Status White;Capped;Flushed 8/25/2017  7:33 AM   Positive Blood Return (Site #3) Yes 8/25/2017  7:33 AM   External Catheter Length (cm) 5 centimeters 8/17/2017  3:01 PM   Alcohol Cap Used Yes 8/25/2017  7:33 AM              Condom Catheter 08/20/17 (Active)   Criteria for Appropriate Use Multiple Stage 3 or 4  pressure ulcers, chest to knees 8/25/2017  7:33 AM   Status Draining 8/25/2017  7:33 AM   Site Condition No abnormalities 8/25/2017  7:33 AM   Drainage Tube Clipped to Bed Yes 8/25/2017  7:33 AM   Catheter Secured to Thigh Yes 8/25/2017  7:33 AM   Tamper Seal Intact Yes 8/25/2017  7:33 AM   Bag Below Bladder/Not on Floor Yes 8/25/2017  7:33 AM   Lack of Dependent Loop in Tubing Yes 8/25/2017  7:33 AM   Drainage Bag Less Than Half Full Yes 8/25/2017  7:33 AM   Sterile Solution Used for  Irrigation N/A 8/25/2017  7:33 AM   Urine Output (mL) 350 ml 8/25/2017  7:33 AM                Urinary Catheter Condom Catheter 08/20/17-Criteria for Appropriate Use: Multiple Stage 3 or 4  pressure ulcers, chest to knees    Intake & Output   Date 08/24/17 0700 - 08/25/17 0659 08/25/17 0700 - 08/26/17 0659   Shift 0700-1859 3932-1470 24 Hour Total 7756-2290 5889-8162 24 Hour Total   I  N  T  A  K  E   P.O. 50995  57890         P. O. 13697  73529       I.V.  (mL/kg/hr)  800  (0.7) 800  (0.4) 0  0      Volume (cefepime (MAXIPIME) 2 g in 0.9% sodium chloride (MBP/ADV) 100 mL)  300 300 0  0      Volume (vancomycin (VANCOCIN) 1250 mg in  ml infusion)  500 500 0  0    Shift Total  (mL/kg) 09351  (173.7) 800  (8.5) 77158  (199.1) 0  (0)  0  (0)   O  U  T  P  U  T   Urine  (mL/kg/hr) 1150  (0.9) 850  (0.7) 2000  (0.9) 350  350      Urine Output (mL) (Condom Catheter 08/20/17) 1207 320 3533 350  350    Shift Total  (mL/kg) 1150  (11.1) 850  (9) 2000  (21.1) 350  (3.7)  350  (3.7)   NET 84931 -50 46314 -350  -350   Weight (kg) 103.9 94.7 94.7 94.7 94.7 94.7         Readmission Risk Assessment Tool Score High Risk            33       Total Score        3 Has Seen PCP in Last 6 Months (Yes=3, No=0)    2 . Living with Significant Other. Assisted Living. LTAC. SNF. or   Rehab    3 Patient Length of Stay (>5 days = 3)    4 IP Visits Last 12 Months (1-3=4, 4=9, >4=11)    5 Pt.  Coverage (Medicare=5 , Medicaid, or Self-Pay=4)    16 Charlson Comorbidity Score (Age + Comorbid Conditions)       Expected Length of Stay 1d 21h   Actual Length of Stay 8

## 2017-08-25 NOTE — HOSPICE
190 Brittany Frankel RN information referral note. Received referral for information visit from [facility]. TC to wife Jefe Navarro and support given. Mrs. Anthony Book requests meeting tomorrow at 3:00 pm    Plans are to meet at that time. Thank you for asking us to share information about hospice services with this gentleman and his family. Júnior Sexton, RN  [phone] if questions or concerns.

## 2017-08-25 NOTE — PROGRESS NOTES
Problem: Falls - Risk of  Goal: *Absence of Falls  Document Maximino Fall Risk and appropriate interventions in the flowsheet.    Outcome: Progressing Towards Goal  Fall Risk Interventions:  Mobility Interventions: Assess mobility with egress test, Bed/chair exit alarm, Communicate number of staff needed for ambulation/transfer, Patient to call before getting OOB, PT Consult for mobility concerns, PT Consult for assist device competence     Mentation Interventions: Adequate sleep, hydration, pain control     Medication Interventions: Assess postural VS orthostatic hypotension, Bed/chair exit alarm, Evaluate medications/consider consulting pharmacy, Teach patient to arise slowly     Elimination Interventions: Bed/chair exit alarm, Call light in reach, Toileting schedule/hourly rounds     History of Falls Interventions: Bed/chair exit alarm, Door open when patient unattended, Room close to nurse's station

## 2017-08-26 LAB
ALBUMIN SERPL-MCNC: 1.5 G/DL (ref 3.5–5)
ALBUMIN/GLOB SERPL: 0.4 {RATIO} (ref 1.1–2.2)
ALP SERPL-CCNC: 84 U/L (ref 45–117)
ALT SERPL-CCNC: 8 U/L (ref 12–78)
ANION GAP SERPL CALC-SCNC: 2 MMOL/L (ref 5–15)
AST SERPL-CCNC: 14 U/L (ref 15–37)
BASOPHILS # BLD: 0 K/UL (ref 0–0.1)
BASOPHILS NFR BLD: 0 % (ref 0–1)
BILIRUB SERPL-MCNC: 0.4 MG/DL (ref 0.2–1)
BUN SERPL-MCNC: 10 MG/DL (ref 6–20)
BUN/CREAT SERPL: 17 (ref 12–20)
CALCIUM SERPL-MCNC: 7.4 MG/DL (ref 8.5–10.1)
CHLORIDE SERPL-SCNC: 99 MMOL/L (ref 97–108)
CO2 SERPL-SCNC: 33 MMOL/L (ref 21–32)
CREAT SERPL-MCNC: 0.58 MG/DL (ref 0.7–1.3)
DATE LAST DOSE: ABNORMAL
EOSINOPHIL # BLD: 0.2 K/UL (ref 0–0.4)
EOSINOPHIL NFR BLD: 2 % (ref 0–7)
ERYTHROCYTE [DISTWIDTH] IN BLOOD BY AUTOMATED COUNT: 14.8 % (ref 11.5–14.5)
GLOBULIN SER CALC-MCNC: 3.7 G/DL (ref 2–4)
GLUCOSE BLD STRIP.AUTO-MCNC: 138 MG/DL (ref 65–100)
GLUCOSE BLD STRIP.AUTO-MCNC: 141 MG/DL (ref 65–100)
GLUCOSE BLD STRIP.AUTO-MCNC: 150 MG/DL (ref 65–100)
GLUCOSE BLD STRIP.AUTO-MCNC: 185 MG/DL (ref 65–100)
GLUCOSE SERPL-MCNC: 114 MG/DL (ref 65–100)
HCT VFR BLD AUTO: 25.5 % (ref 36.6–50.3)
HGB BLD-MCNC: 8.2 G/DL (ref 12.1–17)
LYMPHOCYTES # BLD: 1.2 K/UL (ref 0.8–3.5)
LYMPHOCYTES NFR BLD: 11 % (ref 12–49)
MAGNESIUM SERPL-MCNC: 1.6 MG/DL (ref 1.6–2.4)
MCH RBC QN AUTO: 31.9 PG (ref 26–34)
MCHC RBC AUTO-ENTMCNC: 32.2 G/DL (ref 30–36.5)
MCV RBC AUTO: 99.2 FL (ref 80–99)
MONOCYTES # BLD: 1.2 K/UL (ref 0–1)
MONOCYTES NFR BLD: 11 % (ref 5–13)
NEUTS SEG # BLD: 8.1 K/UL (ref 1.8–8)
NEUTS SEG NFR BLD: 76 % (ref 32–75)
PLATELET # BLD AUTO: 257 K/UL (ref 150–400)
POTASSIUM SERPL-SCNC: 3.7 MMOL/L (ref 3.5–5.1)
PROT SERPL-MCNC: 5.2 G/DL (ref 6.4–8.2)
RBC # BLD AUTO: 2.57 M/UL (ref 4.1–5.7)
REPORTED DOSE,DOSE: ABNORMAL UNITS
REPORTED DOSE/TIME,TMG: ABNORMAL
SERVICE CMNT-IMP: ABNORMAL
SODIUM SERPL-SCNC: 134 MMOL/L (ref 136–145)
VANCOMYCIN TROUGH SERPL-MCNC: 26.7 UG/ML (ref 5–10)
WBC # BLD AUTO: 10.7 K/UL (ref 4.1–11.1)

## 2017-08-26 PROCEDURE — 74011250636 HC RX REV CODE- 250/636: Performed by: INTERNAL MEDICINE

## 2017-08-26 PROCEDURE — 36415 COLL VENOUS BLD VENIPUNCTURE: CPT | Performed by: INTERNAL MEDICINE

## 2017-08-26 PROCEDURE — 82962 GLUCOSE BLOOD TEST: CPT

## 2017-08-26 PROCEDURE — 74011636637 HC RX REV CODE- 636/637: Performed by: INTERNAL MEDICINE

## 2017-08-26 PROCEDURE — 65660000000 HC RM CCU STEPDOWN

## 2017-08-26 PROCEDURE — 77010033678 HC OXYGEN DAILY

## 2017-08-26 PROCEDURE — 74011000250 HC RX REV CODE- 250: Performed by: INTERNAL MEDICINE

## 2017-08-26 PROCEDURE — 80053 COMPREHEN METABOLIC PANEL: CPT | Performed by: INTERNAL MEDICINE

## 2017-08-26 PROCEDURE — 74011250637 HC RX REV CODE- 250/637: Performed by: INTERNAL MEDICINE

## 2017-08-26 PROCEDURE — C9113 INJ PANTOPRAZOLE SODIUM, VIA: HCPCS | Performed by: INTERNAL MEDICINE

## 2017-08-26 PROCEDURE — 74011250636 HC RX REV CODE- 250/636: Performed by: NURSE PRACTITIONER

## 2017-08-26 PROCEDURE — 74011250637 HC RX REV CODE- 250/637: Performed by: NURSE PRACTITIONER

## 2017-08-26 PROCEDURE — 74011000258 HC RX REV CODE- 258: Performed by: NURSE PRACTITIONER

## 2017-08-26 PROCEDURE — 85025 COMPLETE CBC W/AUTO DIFF WBC: CPT | Performed by: INTERNAL MEDICINE

## 2017-08-26 PROCEDURE — 83735 ASSAY OF MAGNESIUM: CPT | Performed by: INTERNAL MEDICINE

## 2017-08-26 PROCEDURE — 94640 AIRWAY INHALATION TREATMENT: CPT

## 2017-08-26 RX ORDER — PANTOPRAZOLE SODIUM 40 MG/1
40 TABLET, DELAYED RELEASE ORAL
Status: DISCONTINUED | OUTPATIENT
Start: 2017-08-27 | End: 2017-08-30 | Stop reason: HOSPADM

## 2017-08-26 RX ORDER — VANCOMYCIN HYDROCHLORIDE
1250
Status: DISCONTINUED | OUTPATIENT
Start: 2017-08-26 | End: 2017-08-27

## 2017-08-26 RX ADMIN — RANOLAZINE 500 MG: 500 TABLET, FILM COATED, EXTENDED RELEASE ORAL at 09:08

## 2017-08-26 RX ADMIN — CEFEPIME HYDROCHLORIDE 2 G: 2 INJECTION, POWDER, FOR SOLUTION INTRAVENOUS at 09:01

## 2017-08-26 RX ADMIN — CEFEPIME HYDROCHLORIDE 2 G: 2 INJECTION, POWDER, FOR SOLUTION INTRAVENOUS at 17:58

## 2017-08-26 RX ADMIN — OXYCODONE HYDROCHLORIDE AND ACETAMINOPHEN 1 TABLET: 5; 325 TABLET ORAL at 17:57

## 2017-08-26 RX ADMIN — ENOXAPARIN SODIUM 40 MG: 40 INJECTION SUBCUTANEOUS at 09:04

## 2017-08-26 RX ADMIN — VANCOMYCIN HYDROCHLORIDE 1250 MG: 10 INJECTION, POWDER, LYOPHILIZED, FOR SOLUTION INTRAVENOUS at 17:50

## 2017-08-26 RX ADMIN — IPRATROPIUM BROMIDE AND ALBUTEROL SULFATE 3 ML: .5; 3 SOLUTION RESPIRATORY (INHALATION) at 07:28

## 2017-08-26 RX ADMIN — LEVOTHYROXINE SODIUM 50 MCG: 50 TABLET ORAL at 09:07

## 2017-08-26 RX ADMIN — IPRATROPIUM BROMIDE AND ALBUTEROL SULFATE 3 ML: .5; 3 SOLUTION RESPIRATORY (INHALATION) at 13:31

## 2017-08-26 RX ADMIN — DOCUSATE SODIUM AND SENNOSIDES 1 TABLET: 8.6; 5 TABLET, FILM COATED ORAL at 09:07

## 2017-08-26 RX ADMIN — ASPIRIN 81 MG: 81 TABLET, COATED ORAL at 09:08

## 2017-08-26 RX ADMIN — RANOLAZINE 500 MG: 500 TABLET, FILM COATED, EXTENDED RELEASE ORAL at 17:57

## 2017-08-26 RX ADMIN — INSULIN LISPRO 2 UNITS: 100 INJECTION, SOLUTION INTRAVENOUS; SUBCUTANEOUS at 12:09

## 2017-08-26 RX ADMIN — GABAPENTIN 600 MG: 300 CAPSULE ORAL at 17:57

## 2017-08-26 RX ADMIN — SODIUM CHLORIDE 40 MG: 9 INJECTION INTRAMUSCULAR; INTRAVENOUS; SUBCUTANEOUS at 09:04

## 2017-08-26 RX ADMIN — CEFEPIME HYDROCHLORIDE 2 G: 2 INJECTION, POWDER, FOR SOLUTION INTRAVENOUS at 02:29

## 2017-08-26 RX ADMIN — LIDOCAINE HYDROCHLORIDE 5 ML: 20 SOLUTION ORAL; TOPICAL at 16:59

## 2017-08-26 RX ADMIN — GABAPENTIN 600 MG: 300 CAPSULE ORAL at 08:10

## 2017-08-26 RX ADMIN — POTASSIUM CHLORIDE 20 MEQ: 750 TABLET, FILM COATED, EXTENDED RELEASE ORAL at 09:06

## 2017-08-26 RX ADMIN — Medication 10 ML: at 22:23

## 2017-08-26 RX ADMIN — Medication 400 MG: at 09:07

## 2017-08-26 RX ADMIN — MIDODRINE HYDROCHLORIDE 5 MG: 5 TABLET ORAL at 12:08

## 2017-08-26 RX ADMIN — OXYCODONE HYDROCHLORIDE AND ACETAMINOPHEN 1 TABLET: 5; 325 TABLET ORAL at 05:28

## 2017-08-26 RX ADMIN — OXYCODONE HYDROCHLORIDE AND ACETAMINOPHEN 1 TABLET: 5; 325 TABLET ORAL at 23:30

## 2017-08-26 RX ADMIN — MONTELUKAST SODIUM 10 MG: 10 TABLET, FILM COATED ORAL at 09:07

## 2017-08-26 RX ADMIN — Medication 10 ML: at 02:30

## 2017-08-26 RX ADMIN — DULOXETINE HYDROCHLORIDE 30 MG: 30 CAPSULE, DELAYED RELEASE ORAL at 09:07

## 2017-08-26 RX ADMIN — GABAPENTIN 600 MG: 300 CAPSULE ORAL at 12:09

## 2017-08-26 RX ADMIN — Medication 1 CAPSULE: at 09:07

## 2017-08-26 RX ADMIN — VANCOMYCIN HYDROCHLORIDE 1250 MG: 10 INJECTION, POWDER, LYOPHILIZED, FOR SOLUTION INTRAVENOUS at 01:20

## 2017-08-26 RX ADMIN — LIDOCAINE HYDROCHLORIDE 5 ML: 20 SOLUTION ORAL; TOPICAL at 12:12

## 2017-08-26 RX ADMIN — IPRATROPIUM BROMIDE AND ALBUTEROL SULFATE 3 ML: .5; 3 SOLUTION RESPIRATORY (INHALATION) at 19:29

## 2017-08-26 RX ADMIN — ATORVASTATIN CALCIUM 10 MG: 10 TABLET, FILM COATED ORAL at 09:07

## 2017-08-26 RX ADMIN — LIDOCAINE HYDROCHLORIDE 5 ML: 20 SOLUTION ORAL; TOPICAL at 07:40

## 2017-08-26 RX ADMIN — Medication 10 ML: at 14:00

## 2017-08-26 RX ADMIN — CASTOR OIL AND BALSAM, PERU: 788; 87 OINTMENT TOPICAL at 09:16

## 2017-08-26 RX ADMIN — GABAPENTIN 600 MG: 300 CAPSULE ORAL at 22:04

## 2017-08-26 RX ADMIN — TAMSULOSIN HYDROCHLORIDE 0.8 MG: 0.4 CAPSULE ORAL at 09:07

## 2017-08-26 RX ADMIN — OXYCODONE HYDROCHLORIDE AND ACETAMINOPHEN 1 TABLET: 5; 325 TABLET ORAL at 12:09

## 2017-08-26 RX ADMIN — ISOSORBIDE MONONITRATE 30 MG: 30 TABLET, EXTENDED RELEASE ORAL at 09:07

## 2017-08-26 RX ADMIN — MIDODRINE HYDROCHLORIDE 5 MG: 5 TABLET ORAL at 16:58

## 2017-08-26 RX ADMIN — INSULIN LISPRO 2 UNITS: 100 INJECTION, SOLUTION INTRAVENOUS; SUBCUTANEOUS at 22:18

## 2017-08-26 RX ADMIN — DOCUSATE SODIUM AND SENNOSIDES 1 TABLET: 8.6; 5 TABLET, FILM COATED ORAL at 17:57

## 2017-08-26 RX ADMIN — POLYETHYLENE GLYCOL 3350 17 G: 17 POWDER, FOR SOLUTION ORAL at 09:03

## 2017-08-26 RX ADMIN — MIDODRINE HYDROCHLORIDE 5 MG: 5 TABLET ORAL at 08:00

## 2017-08-26 NOTE — PROGRESS NOTES
Pharmacy IV to PO Conversion Program    IV to PO conversion of Pantoprazole for this 80 y.o. male being treated for nausea. IV PPIs are reserved for upper GI bleeding only due to nationwide shortage. Recent Labs      17   0236  17   0241  17   0400   CREA  0.58*  0.44*  0.43*   BUN  10  8  9   WBC  10.7  10.6  13.2*     Temp (24hrs), Av.1 °F (36.7 °C), Min:97.4 °F (36.3 °C), Max:98.5 °F (36.9 °C)    Criteria for IV to PO switch  Nonantibiotics  1. Functioning GI tract  a. Taking scheduled oral medications  b. Tolerating tube feeds at goal rate or a full liquid, soft, or regular diet  c. No seizure activity in 24 hours  d.   Patient has not experienced emesis or severe diarrhea within the past 24 hours           Yes     Thanks,    Madalyn Lundborg, [facility]

## 2017-08-26 NOTE — PROGRESS NOTES
Palliative Medicine Consult  [place]: [phone] ([phone])    Patient Name: Estella Gil  YOB: 1932    Date of Initial Consult: 8/23/17  Reason for Consult:  Care decisions/pain mmt  Requesting Provider: Candance Kind   Primary Care Physician: Erick Pierson MD      SUMMARY:   Estella Gil is a 80 y. o. with a past history of  CAD, DM II, HTN, dyslipidemia, AAA, LBBB, GERD, and MI, and PSHx of stent placement x 2, who was admitted on 8/17/2017 from [facility] with a diagnosis of elevated troponin. Current medical issues leading to Palliative Medicine involvement include: severe left hip pain, PNA, canker sores 2/2 drug reaction, . PER :  9[facility]2016[facility]5/2017 [facility]  5/2017-6/2017 Norco 5/325mg #30 q. 2 weeks Dr. Chino Allen  6/5/2017 Norco 5/325mg #90/23 days Easter Bowling  6/26/17 Norco 5/325mg #30/8 days Chino Allen  7/5/17 percocet 5/325mg #90/23 days, Easter Bowling  7/16/17 oxycodone 15 IR #20/4 days, Robert De Lunae  7/22/17 diazepam 5mg #30/15 days Elveria Maine  7/23/17 oxycodone 15 IR #30/5 days Elveria Maine    Psychosocial: lives at home with wife, daughter and DAGO; daughter and DAGO were caring for pt, however, DAGO had to have a hernia repaired so pt was moved to [facility]; DAGO is still recovering, thus, not able to assist with care. PALLIATIVE DIAGNOSES:   1. Chest pain: HCAP  2. Abdominal pain  3. Nausea and vomiting  4. Left hip pain x 3 months (chronic impacted left femoral head fracture): 8/21 hip aspiration  5. Sacral decubitus ulcer: stage 3  6. Diabetic polyneuropathy   7. Obesity   8. Severe protein calorie malnutrition   9. canker sores, MRSA pos  10. Weight loss (240 lbs on 7/19/17)  (now 227 lbs)  Not eating with canker sores x 2 weeks  11. Opioid induced constipation  12. Gait impairment     PLAN:   1. Visited w/ pt in his rm. No family at bedside. 2. Pain- overall, good control.   Pt feels he is doing ok  -continue w/ current regimen  3. Hospice liaison RN's note reviewed. Pt and family will be meeting w/ hospice liaison staff tomorrow @ 1500.    4. Opioid induced constipation  -Continue daily Miralax and Pericolace bid. 5. Communicated plan of care with: Palliative IDT, night shift bedside RN Angela Luis). Thank you for this consult that has provided us with the opportunity to be involved in this patient's care. We will continue to follow along with you. Should you have any questions or concerns prior to our next visit at the bedside, please do not hesitate to contact us at [phone]). Harman Guadalupe MD  Palliative Care Team     GOALS OF CARE / TREATMENT PREFERENCES:   [====Goals of Care====]  GOALS OF CARE:  Patient / health care proxy stated goals:   Continue current measures    TREATMENT PREFERENCES:   Code Status: DNR  Pt's code status is DNAR/AND    Advance Care Planning:  Advance Care Planning 8/24/2017   Patient's Healthcare Decision Maker is: Named in scanned ACP document   Primary Decision Maker Name Corbin Christopher   Primary Decision Maker Phone Number [phone]   Primary Decision Maker Relationship to Patient Spouse   Secondary Decision Maker Name Velia Mcclellan   Secondary Decision Maker Phone Number [phone]   Secondary Decision Maker Relationship to Patient Adult child   Confirm Advance Directive Yes, on file       Other:    The palliative care team has discussed with patient / health care proxy about goals of care / treatment preferences for patient.  [====Goals of Care====]         HISTORY:     History obtained from: chart    CHIEF COMPLAINT: left hip pain    HPI/SUBJECTIVE:    The patient is:   [x] Verbal and participatory  [] Non-participatory due to:   No o/n events or issues  Pt reports pain control is good overall.      Clinical Pain Assessment (nonverbal scale for severity on nonverbal patients):   [++++ Clinical Pain Assessment++++]  [++++Pain Severity++++]: Pain: 7/10  [++++Pain Character++++]: sharp  [++++Pain Duration++++]:   \"a long time\"  [++++Pain Effect++++]:   Unable to mobilize  [++++Pain Factors++++]:  movement  [++++Pain Frequency++++]: with movement  [++++Pain Location++++]: left hip  [++++ Clinical Pain Assessment++++]  Duration: for how long has pt been experiencing pain (e.g., 2 days, 1 month, years)  Frequency: how often pain is an issue (e.g., several times per day, once every few days, constant)     FUNCTIONAL ASSESSMENT:     Palliative Performance Scale (PPS):  PPS: 20       PSYCHOSOCIAL/SPIRITUAL SCREENING:     Advance Care Planning:  Advance Care Planning 8/24/2017   Patient's Healthcare Decision Maker is: Named in scanned ACP document   Primary Decision Maker Name Duane Sterling   Primary Decision Maker Phone Number [phone]   Primary Decision Maker Relationship to Patient Spouse   Secondary Decision Maker Name [facility] Phone Number [phone]   Secondary Decision Maker Relationship to Patient Adult child   Confirm Advance Directive Yes, on file        Any spiritual / Synagogue concerns:  [] Yes /  [x] No    Caregiver Burnout:  [] Yes /  [] No /  [x] No Caregiver Present      Anticipatory grief assessment:   [x] Normal  / [] Maladaptive       ESAS Anxiety: Anxiety: 0    ESAS Depression: Depression: 2        REVIEW OF SYSTEMS:     Positive and pertinent negative findings in ROS are noted above in HPI. The following systems were [x] reviewed / [] unable to be reviewed as noted in HPI  Other findings are noted below. Systems: constitutional, ears/nose/mouth/throat, respiratory, gastrointestinal, genitourinary, musculoskeletal, integumentary, neurologic, psychiatric, endocrine. Positive findings noted below.   Modified ESAS Completed by: provider   Fatigue: 6 Drowsiness: 0   Depression: 2 Pain: 7   Anxiety: 0 Nausea: 0   Anorexia: 9 Dyspnea: 0     Constipation: Yes              PHYSICAL EXAM:     From RN flowsheet:  Wt Readings from Last 3 Encounters:   08/25/17 208 lb 11.2 oz (94.7 kg)   08/17/17 217 lb 13 oz (98.8 kg)   08/13/17 220 lb 0.3 oz (99.8 kg)     Blood pressure 97/50, pulse 78, temperature 97.4 °F (36.3 °C), resp. rate 18, height 5' 11\" (1.803 m), weight 208 lb 11.2 oz (94.7 kg), SpO2 98 %.     Pain Scale 1: Numeric (0 - 10)  Pain Intensity 1: 0  Pain Onset 1: acute  Pain Location 1: Hip  Pain Orientation 1: Left  Pain Description 1: Aching  Pain Intervention(s) 1: Medication (see MAR)  Last bowel movement, if known: PT DOES NOT REMEMBER, NO BM DOCUMENTED THIS ADMISSION    Constitutional: elderly, obese, awake, alert, oriented to self, situation  Eyes: pupils equal, anicteric  ENMT: no nasal discharge, moist mucous membranes  Cardiovascular: regular rhythm, distal pulses intact  Respiratory: breathing not labored, symmetric  Gastrointestinal: soft non-tender, +bowel sounds  Musculoskeletal: no deformity, pain with left hip passive ROM  Skin: warm, dry, stage 3 decubitus ulcer  Neurologic: following commands, moving all extremities  Psychiatric: full affect, no hallucinations         HISTORY:     Active Problems:    Coronary artery disease involving native coronary artery of native heart without angina pectoris (7/14/2017)      Essential hypertension (7/14/2017)      Abdominal pain (8/17/2017)      Elevated troponin (8/17/2017)      CAP (community acquired pneumonia) (8/19/2017)      Therapeutic opioid-induced constipation (OIC) (8/23/2017)      Chronic left hip pain (8/23/2017)      Weight loss (8/23/2017)      Abnormality of gait due to impairment of balance (8/23/2017)      Anorexia (8/24/2017)      Past Medical History:   Diagnosis Date    AAA (abdominal aortic aneurysm) (HCC)     Asthma     CAD (coronary artery disease)     Mitzy MPI 7/14 LVEF 45, chronic inf-lat ischemia; Echo 7/14 LVEF 45, mod LVH, mild LAE, mild AI/MR    Diabetic polyneuropathy (HCC)     DJD (degenerative joint disease)     DM II (diabetes mellitus, type II), controlled ([facility]     Dyslipidemia     GERD (gastroesophageal reflux disease)     History of left bundle branch block (LBBB)     HTN (hypertension)     Myocardial infarction ([facility]     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    Pneumonia     [facility] 2012    s/p XRT, hormonal      Past Surgical History:   Procedure Laterality Date    HX APPENDECTOMY      HX HEMORRHOIDECTOMY      HX PTCA      x 2 stents, vessel unknown, last 8 yrs ago      Family History   Problem Relation Age of Onset    Tuberculosis Mother     Asthma Father     Coronary Artery Disease Sister     Asthma Sister       History reviewed, no pertinent family history.   Social History   Substance Use Topics    Smoking status: Former Smoker     Types: Cigarettes     Quit date: 7/14/1997    Smokeless tobacco: Never Used    Alcohol use No     Allergies   Allergen Reactions    Contrast Agent [Iodine] Hives    Levaquin [Levofloxacin] Unknown (comments)    Sulfa (Sulfonamide Antibiotics) Unknown (comments)      Current Facility-Administered Medications   Medication Dose Route Frequency    albuterol-ipratropium (DUO-NEB) 2.5 MG-0.5 MG/3 ML  3 mL Nebulization TID RT    VANCOMYCIN INFORMATION NOTE   Other ONCE    midodrine (PROAMITINE) tablet 5 mg  5 mg Oral TID WITH MEALS    potassium chloride SR (KLOR-CON 10) tablet 20 mEq  20 mEq Oral DAILY    magnesium oxide (MAG-OX) tablet 400 mg  400 mg Oral DAILY    acetaminophen (TYLENOL) tablet 650 mg  650 mg Oral Q4H PRN    vancomycin (VANCOCIN) 1250 mg in  ml infusion  1,250 mg IntraVENous Q12H    oxyCODONE-acetaminophen (PERCOCET) 5-325 mg per tablet 1 Tab  1 Tab Oral Q6H    HYDROmorphone (PF) (DILAUDID) injection 0.2 mg  0.2 mg IntraVENous Q4H PRN    labetalol (NORMODYNE;TRANDATE) injection 10 mg  10 mg IntraVENous Q6H PRN    lactobac ac& pc-s.therm-b.anim (DULCE Q/RISAQUAD)  1 Cap Oral DAILY    ranolazine ER (RANEXA) tablet 500 mg  500 mg Oral BID    cefepime (MAXIPIME) 2 g in 0.9% sodium chloride (MBP/ADV) 100 mL  2 g IntraVENous Q8H    balsam peru-castor oil (VENELEX)  mg/gram ointment   Topical DAILY    magic mouthwash cpd (with sucralfate)  5 mL Oral TIDAC    aspirin delayed-release tablet 81 mg  81 mg Oral DAILY    atorvastatin (LIPITOR) tablet 10 mg  10 mg Oral DAILY    DULoxetine (CYMBALTA) capsule 30 mg  30 mg Oral DAILY    gabapentin (NEURONTIN) capsule 600 mg  600 mg Oral QID    isosorbide mononitrate ER (IMDUR) tablet 30 mg  30 mg Oral DAILY    levothyroxine (SYNTHROID) tablet 50 mcg  50 mcg Oral ACB    montelukast (SINGULAIR) tablet 10 mg  10 mg Oral DAILY    nitroglycerin (NITROSTAT) tablet 0.4 mg  0.4 mg SubLINGual PRN    tamsulosin (FLOMAX) capsule 0.8 mg  0.8 mg Oral DAILY    pantoprazole (PROTONIX) 40 mg in sodium chloride 0.9 % 10 mL injection  40 mg IntraVENous DAILY    ondansetron (ZOFRAN) injection 4 mg  4 mg IntraVENous Q4H PRN    sodium chloride (NS) flush 5-10 mL  5-10 mL IntraVENous Q8H    sodium chloride (NS) flush 5-10 mL  5-10 mL IntraVENous PRN    naloxone (NARCAN) injection 0.4 mg  0.4 mg IntraVENous PRN    enoxaparin (LOVENOX) injection 40 mg  40 mg SubCUTAneous DAILY    insulin lispro (HUMALOG) injection   SubCUTAneous AC&HS    glucose chewable tablet 16 g  4 Tab Oral PRN    glucagon (GLUCAGEN) injection 1 mg  1 mg IntraMUSCular PRN    dextrose 10% infusion 125-250 mL  125-250 mL IntraVENous PRN    senna-docusate (PERICOLACE) 8.6-50 mg per tablet 1 Tab  1 Tab Oral BID    polyethylene glycol (MIRALAX) packet 17 g  17 g Oral DAILY          LAB AND IMAGING FINDINGS:     Lab Results   Component Value Date/Time    WBC 10.6 08/25/2017 02:41 AM    HGB 8.2 08/25/2017 02:41 AM    PLATELET 665 65/90/6651 02:41 AM     Lab Results   Component Value Date/Time    Sodium 134 08/25/2017 02:41 AM    Potassium 3.6 08/25/2017 02:41 AM    Chloride 98 08/25/2017 02:41 AM    CO2 34 08/25/2017 02:41 AM    BUN 8 08/25/2017 02:41 AM    Creatinine 0.44 08/25/2017 02:41 AM    Calcium 7.1 08/25/2017 02:41 AM    Magnesium 1.6 08/25/2017 02:41 AM      Lab Results   Component Value Date/Time    AST (SGOT) 15 08/25/2017 02:41 AM    Alk. phosphatase 78 08/25/2017 02:41 AM    Protein, total 5.1 08/25/2017 02:41 AM    Albumin 1.5 08/25/2017 02:41 AM    Globulin 3.6 08/25/2017 02:41 AM     Lab Results   Component Value Date/Time    INR 1.4 08/17/2017 02:39 PM    Prothrombin time 15.7 08/17/2017 02:39 PM    aPTT 20.4 08/17/2017 02:39 PM      No results found for: IRON, FE, TIBC, IBCT, PSAT, FERR   No results found for: PH, PCO2, PO2  No components found for: [place]   Lab Results   Component Value Date/Time    CK 23 08/17/2017 05:30 PM    CK - MB 1.8 08/17/2017 05:30 PM                Total time: 25 min  Counseling / coordination time, spent as noted above: 15 min  > 50% counseling / coordination?: yes  Prolonged service was provided for  []30 min   []75 min in face to face time in the presence of the patient, spent as noted above. Time Start:   Time End:   Note: this can only be billed with 66640 (initial) or 25669 (follow up). If multiple start / stop times, list each separately.

## 2017-08-26 NOTE — PROGRESS NOTES
Hospitalist Progress Note    NAME: Maddy Contreras   :  1932   MRN:  [id number]       Interim Hospital Summary: 80 y.o. male whom presented on 2017 with      Assessment / Plan:  HCAP  Likely aspiration pneumonia  - CXR revaled Left upper lobe atelectasis/consolidation. - continue with IV Cefepime and Vanc (9/10). Pt developed severe allergic reaction after taking levofloxacin which resulted severe canker sores. - O2 Sat 96% at 2L of O2 via n/c; wean O2 as tolerate  CXR still showing mass like atelectatic area in left lung will try mucomyst nebs and get Pulmonology advice. Canker sores  - magic mouth wash; asked nursing staff to help with oral care using sponges or swish and spit when pt is able. Resolved  Chest Pain  Elevated Troponin  History of CAD s/p remote PCI  Stress Test negative on 17  Hyperlipidemia'  Chronic Systolic Heart Failure: (may be end stage Cardiomyopathy)  - discussed with the family and pt yesterday in regards of code status and goals of care. Patient and the family requested continue with current care, do not wish to have any invasive procedures.  Pt and the family asked for optimal pain control. Will consult palliative team to follow. - no further chest pain since we added Ranexa   - EKG with TWI in V1-V5  - troponin went up to 0.55 from 0.32  - cardiology following; communicated with cardiology team on pt's wish. Conservative medical management. Cardiology team will follow as need basis  - Echo: Ejection fraction was estimated in the range of 15 % to 20 %  - continue ASA, BB, Ranexa, & IMDUR  - continue statin, CXR not in pulmonary edema, not able to tolerate diuretics due to low BP. Hypotension: becoming hypotensive every day in the afternoon requiring IVF boluses, started yesterday on Midodrine, monitor. Abdominal Pain: now resolved  Nausea/vomiting  - CT Abdomen/Pelvis: Distended gallbladder.  Infrarenal abdominal aortic aneurysm measures 5.3 x 5 cm (no changes from previous images), Colonic diverticulosis. - Abdominal ultrasound:Sludge layers dependently in the nondistended gallbladder. No wall thickening or pericholecystic fluid.    - Avoid NSAID  - IV PPI & IV antiemetics  - stop diclofenac  Chronic Impacted Left Femoral Head Fracture: ideally needs total hip replacement to control pain  - D/erika  fentanyl patch  May be contributing to hypotension and excessive sedation  - scheduled Acetaminophen  - prn Oxycodone or dilaudid, valium as need for severe muscle spasm  - Dr. Raya Williamson spoke with Dr. Brayan Moraes in Radiology to get addendum added on to patient CT A/P which was preformed at [facility] detailing patient's left hip pathology  - ortho following; pain management. IR for aspiration to r/o septic joint  Sacral Decub:  - consult wound care; STAGE 4 decubitus ulcer  Type 2 diabetes mellitus without complication, without long-term current use of insulin   Diabetic polyneuropathy  - hold home oral antihyperglycemics and place on SSI  - Hgb A1c 6.6  - continue home Gabapentin  Abdominal aortic aneurysm (AAA)   - stable on today's CT:Infrarenal abdominal aortic aneurysm measures 5.3 x 5 cm  Obesity  Severe Protein Calorie Malnutrition  -consult Nutrition  Hypokalemia: replace and monitor. Hypomagnesemia: replace and monitor. Constipation: already on laxatives, will order enemas  Code Status: DNR   DVT Prophylaxis: Lovenox  GI Prophylaxis: PPI as above   Baseline: Bed bound for last 3 months per patient report  Recommended [id number] [employer] team will meet with family today     Subjective:     Chief Complaint / Reason for Physician Visit  \"I feel better\". Discussed with RN events overnight.      Review of Systems:  Symptom Y/N Comments  Symptom Y/N Comments   Fever/Chills    Chest Pain     Poor Appetite    Edema n    Cough    Abdominal Pain     Sputum    Joint Pain y    SOB/LEWIS    Pruritis/Rash     Nausea/vomit    Tolerating PT/OT     Diarrhea    Tolerating Diet y Constipation    Other       Could NOT obtain due to:      Objective:     VITALS:   Last 24hrs VS reviewed since prior progress note. Most recent are:  Patient Vitals for the past 24 hrs:   Temp Pulse Resp BP SpO2   08/26/17 0728 - - - - 96 %   08/26/17 0716 98.2 °F (36.8 °C) 85 18 107/51 97 %   08/26/17 0600 - 82 - 149/60 98 %   08/26/17 0530 - 83 - 114/56 98 %   08/26/17 0500 - 82 - 111/52 99 %   08/26/17 0430 - 81 - 102/49 98 %   08/26/17 0400 - 83 - 114/53 98 %   08/26/17 0330 - 82 - 121/54 99 %   08/26/17 0300 - 79 - 109/49 99 %   08/26/17 0227 98.4 °F (36.9 °C) 80 16 105/50 97 %   08/26/17 0030 - 76 - 97/47 99 %   08/26/17 0000 - 76 - 94/48 99 %   08/25/17 2330 98.5 °F (36.9 °C) 79 16 (!) 88/46 98 %   08/25/17 2200 - 81 - 97/49 98 %   08/25/17 2100 - 83 - 97/52 97 %   08/25/17 2000 - 78 - 97/50 98 %   08/25/17 1945 - - - - 97 %   08/25/17 1937 97.4 °F (36.3 °C) 79 18 91/48 97 %   08/25/17 1852 98.1 °F (36.7 °C) 83 20 (!) 76/42 97 %   08/25/17 1835 98.1 °F (36.7 °C) 84 20 (!) 74/36 96 %   08/25/17 1800 98.1 °F (36.7 °C) 85 20 (!) 74/44 96 %   08/25/17 1730 98.1 °F (36.7 °C) 85 20 93/55 98 %   08/25/17 1701 98.2 °F (36.8 °C) 84 20 92/54 97 %   08/25/17 1646 98.2 °F (36.8 °C) 86 20 94/53 97 %   08/25/17 1556 98.1 °F (36.7 °C) 84 20 (!) 85/43 97 %   08/25/17 1442 - - - - 92 %   08/25/17 1057 97.8 °F (36.6 °C) 80 20 114/58 98 %       Intake/Output Summary (Last 24 hours) at 08/26/17 0830  Last data filed at 08/26/17 0716   Gross per 24 hour   Intake              800 ml   Output             1450 ml   Net             -650 ml        PHYSICAL EXAM:  General: WD, WN.awake, no acute distress    EENT:  EOMI. Anicteric sclerae. MMM  Resp:  Coarse BS, rhonchi  CV:  Regular  rhythm,  No edema  GI:  Soft, obese, Non tender.  +Bowel sounds  Neurologic:  Awake, oriented x2. normal speech, GCS [id number]  Psych:   Fair insight. Not anxious nor agitated  Skin:  No rashes.   No jaundice    Reviewed most current lab test results and cultures  YES  Reviewed most current radiology test results   YES  Review and summation of old records today    NO  Reviewed patient's current orders and MAR    YES  PMH/SH reviewed - no change compared to H&P  ________________________________________________________________________  Care Plan discussed with:    Comments   Patient y    Family  y wife   RN y    Care Manager y    Consultant  y Ortho                    y Multidiciplinary team rounds were held today with , nursing, pharmacist and clinical coordinator. Patient's plan of care was discussed; medications were reviewed and discharge planning was addressed. ________________________________________________________________________  Total NON critical care TIME:  20   Minutes    Total CRITICAL CARE TIME Spent:   Minutes non procedure based      Comments   >50% of visit spent in counseling and coordination of care y    ________________________________________________________________________  Marimar Rosales MD     Procedures: see electronic medical records for all procedures/Xrays and details which were not copied into this note but were reviewed prior to creation of Plan. LABS:  I reviewed today's most current labs and imaging studies.   Pertinent labs include:  Recent Labs      08/26/17   0236  08/25/17   0241  08/24/17   0400   WBC  10.7  10.6  13.2*   HGB  8.2*  8.2*  8.2*   HCT  25.5*  25.3*  25.8*   PLT  257  230  231     Recent Labs      08/26/17   0236  08/25/17   0241  08/24/17   0400   NA  134*  134*  135*   K  3.7  3.6  3.2*   CL  99  98  99   CO2  33*  34*  31   GLU  114*  112*  95   BUN  10  8  9   CREA  0.58*  0.44*  0.43*   CA  7.4*  7.1*  7.4*   MG  1.6  1.6  1.5*   ALB  1.5*  1.5*  1.6*   TBILI  0.4  0.4  0.5   SGOT  14*  15  19   ALT  8*  9*  11*       Signed: )Marimar Rosales MD

## 2017-08-26 NOTE — PROGRESS NOTES
PCU SHIFT NURSING NOTE      Bedside and Verbal shift change report given to Inés Meyer RN (oncoming nurse) by LYDIA Mccarthy (offgoing nurse). Report included the following information SBAR, Kardex, ED Summary, Procedure Summary, Intake/Output, MAR, Recent Results, Med Rec Status, Cardiac Rhythm NSR with 1AVB and Alarm Parameters . Shift Summary:         Admission Date 8/17/2017   Admission Diagnosis Abdominal pain  Elevated troponin   Consults IP CONSULT TO CARDIOLOGY  IP CONSULT TO ORTHOPEDIC SURGERY  IP CONSULT TO PALLIATIVE CARE - PROVIDER  IP CONSULT TO PULMONOLOGY        Consults   [x]PT   [x]OT   [x]Speech   [x]Case Management      [x] Palliative      Cardiac Monitoring Order   [x]Yes   []No     IV drips   []Yes    Drip:                            Dose:  Drip:                            Dose:  Drip:                            Dose:   [x]No     GI Prophylaxis   [x]Yes   []No         DVT Prophylaxis   SCDs:             Jesus stockings:         [x] Medication   []Contraindicated   []None      Activity Level Activity Level: Bed Rest     Activity Assistance: Complete care   Purposeful Rounding every 1-2 hour? [x]Yes   Miller Score  Total Score: 3   Bed Alarm (If score 3 or >)   [x]Yes   [] Refused (See signed refusal form in chart)   Kwan Score  Kwan Score: 13   Kwan Score (if score 14 or less)   [x]PMT consult   [x]Wound Care consult      [x]Specialty bed   [x] Nutrition consult          Needs prior to discharge:   Home O2 required:    [x]Yes   []No    If yes, how much O2 required?     Other:    Last Bowel Movement: Last Bowel Movement Date: 08/20/17      Influenza Vaccine Received Flu Vaccine for Current Season (usually Sept-March): Not Flu Season        Pneumonia Vaccine           Diet Active Orders   Diet    DIET DYSPHAGIA ADVANCED SOFT (NDD3)      LDAs           Triple Lumen 08/17/17 Right;Upper Subclavian (Active)   Central Line Being Utilized Yes 8/26/2017  7:16 AM   Criteria for Appropriate Use Limited/no vessel suitable for conventional peripheral access 8/26/2017  7:16 AM   Site Assessment Clean, dry, & intact 8/26/2017  7:16 AM   Infiltration Assessment 0 8/26/2017  7:16 AM   Affected Extremity/Extremities Color distal to insertion site pink (or appropriate for race) 8/26/2017  7:16 AM   Date of Last Dressing Change 08/24/17 8/26/2017  7:16 AM   Dressing Status Clean, dry, & intact 8/26/2017  7:16 AM   Dressing Type Disk with Chlorhexadine gluconate (CHG); Transparent 8/26/2017  7:16 AM   Action Taken Open ports on tubing capped 8/26/2017  7:16 AM   Proximal Hub Color/Line Status [place] Doc; Infusing;Flushed 8/26/2017  7:16 AM   Positive Blood Return (Medial Site) Yes 8/26/2017  7:16 AM   Medial Hub Color/Line Status Blue;Capped;Flushed 8/26/2017  7:16 AM   Positive Blood Return (Lateral Site) Yes 8/26/2017  7:16 AM   Distal Hub Color/Line Status White;Capped;Flushed 8/26/2017  7:16 AM   Positive Blood Return (Site #3) Yes 8/26/2017  7:16 AM   External Catheter Length (cm) 5 centimeters 8/17/2017  3:01 PM   Alcohol Cap Used Yes 8/26/2017  7:16 AM              Condom Catheter 08/20/17 (Active)   Criteria for Appropriate Use Multiple Stage 3 or 4  pressure ulcers, chest to knees 8/26/2017  7:16 AM   Status Draining 8/26/2017  7:16 AM   Site Condition No abnormalities 8/26/2017  7:16 AM   Drainage Tube Clipped to Bed Yes 8/26/2017  7:16 AM   Catheter Secured to Thigh Yes 8/26/2017  7:16 AM   Tamper Seal Intact Yes 8/26/2017  7:16 AM   Bag Below Bladder/Not on Floor Yes 8/26/2017  7:16 AM   Lack of Dependent Loop in Tubing Yes 8/26/2017  7:16 AM   Drainage Bag Less Than Half Full Yes 8/26/2017  7:16 AM   Sterile Solution Used for  Irrigation N/A 8/26/2017  7:16 AM   Urine Output (mL) 150 ml 8/26/2017  2:27 AM                Urinary Catheter Condom Catheter 08/20/17-Criteria for Appropriate Use: Multiple Stage 3 or 4  pressure ulcers, chest to knees    Intake & Output   Date 08/25/17 0700 - 08/26/17 0659 08/26/17 0700 - 08/27/17 0659   Shift 2451-6966 7113-4034 24 Hour Total 7736-6506 6671-5313 24 Hour Total   I  N  T  A  K  E   I.V.  (mL/kg/hr) 450  (0.4) 350  (0.3) 800  (0.3) 0  0      Volume (sodium chloride 0.9 % bolus infusion 250 mL) 0  0         Volume (sodium chloride 0.9 % bolus infusion 250 mL) 0  0         Volume (cefepime (MAXIPIME) 2 g in 0.9% sodium chloride (MBP/ADV) 100 mL) 200 100 300 0  0      Volume (vancomycin (VANCOCIN) 1250 mg in  ml infusion) 250 250 500 0  0    Shift Total  (mL/kg) 450  (4.8) 350  (3.7) 800  (8.4) 0  (0)  0  (0)   O  U  T  P  U  T   Urine  (mL/kg/hr) 1550  (1.4) 250  (0.2) 1800  (0.8)         Urine Voided 350  350         Urine Output (mL) (Condom Catheter 08/20/17) 3943 716 8138       Shift Total  (mL/kg) 1550  (16.4) 250  (2.6) 1800  (18.9)      NET -1100 100 -1000 0  0   Weight (kg) 94.7 95.3 95.3 106.3 106.3 106.3         Readmission Risk Assessment Tool Score High Risk            33       Total Score        3 Has Seen PCP in Last 6 Months (Yes=3, No=0)    2 . Living with Significant Other. Assisted Living. LTAC. SNF. or   Rehab    3 Patient Length of Stay (>5 days = 3)    4 IP Visits Last 12 Months (1-3=4, 4=9, >4=11)    5 Pt.  Coverage (Medicare=5 , Medicaid, or Self-Pay=4)    16 Charlson Comorbidity Score (Age + Comorbid Conditions)       Expected Length of Stay 1d 21h   Actual Length of Stay 9

## 2017-08-26 NOTE — PROGRESS NOTES
Vancomycin level = 26.7. Extrapolated level = 23.72.  Will change dosing interval to 1250 mg IV Q16H

## 2017-08-26 NOTE — HOSPICE
[facility] follow up on consultation visit note    Order for consult noted. History and events of this hospitalization reviewed. Met with patient, wife Mary Jo Barajas and daughter Tasia Beltre. Support given as they [place] had a rough time of it. \"    Reviewed hospice in various places such as inpatient , at home, at [facility]. Family said they would really like for patient to remain here because it causes him such severe pain to just move him in the bed. They expressed understanding that patient will not recover       Discussed with Dr. Flakito Terrazas. Above reviewed with Dr. Aguirre [facility] Recommended to  assess patient again tomorrow.   Janie Harada, RN

## 2017-08-26 NOTE — PROGRESS NOTES
PCU SHIFT NURSING NOTE    Bedside report given by Jona Sepulveda. Report included SBAR, Kardex, Summary, Intake/Output, MAR, recent results, status, cardiac rhythms, antibiotics and alarm parameters. 9451: Patient sitting up in bed. Patient resting with eyes closed. Patient responds to verbal stimuli. Alert and oriented x4. Easily arousable but sleepy. Assessment completed and vital signs received. Drake bag empty. Patient denies any complaints at this time. Lights dimmed for patient comfort. Will continue to monitor.

## 2017-08-26 NOTE — PROGRESS NOTES
Problem: Falls - Risk of  Goal: *Absence of Falls  Document Maximino Fall Risk and appropriate interventions in the flowsheet.    Outcome: Progressing Towards Goal  Fall Risk Interventions:  Mobility Interventions: Assess mobility with egress test, Bed/chair exit alarm, Communicate number of staff needed for ambulation/transfer, Mechanical lift, PT Consult for assist device competence, PT Consult for mobility concerns, Utilize gait belt for transfers/ambulation, Utilize walker, cane, or other assitive device, Patient to call before getting OOB, OT consult for ADLs, Strengthening exercises (ROM-active/passive)     Mentation Interventions: Adequate sleep, hydration, pain control, Bed/chair exit alarm, Door open when patient unattended, Evaluate medications/consider consulting pharmacy, Gait belt with transfers/ambulation, Reorient patient, Update white board, Toileting rounds, More frequent rounding, Increase mobility, Familiar objects from home, Room close to nurse's station, Eyeglasses and hearing aids     Medication Interventions: Bed/chair exit alarm, Evaluate medications/consider consulting pharmacy, Patient to call before getting OOB, Teach patient to arise slowly     Elimination Interventions: Bed/chair exit alarm, Call light in reach, Elevated toilet seat, Patient to call for help with toileting needs, Toilet paper/wipes in reach, Toileting schedule/hourly rounds     History of Falls Interventions: Bed/chair exit alarm, Consult care management for discharge planning, Door open when patient unattended, Room close to nurse's station, Investigate reason for fall, Utilize gait belt for transfer/ambulation

## 2017-08-27 LAB
ALBUMIN SERPL-MCNC: 1.4 G/DL (ref 3.5–5)
ALBUMIN/GLOB SERPL: 0.4 {RATIO} (ref 1.1–2.2)
ALP SERPL-CCNC: 78 U/L (ref 45–117)
ALT SERPL-CCNC: 8 U/L (ref 12–78)
ANION GAP SERPL CALC-SCNC: 3 MMOL/L (ref 5–15)
AST SERPL-CCNC: 12 U/L (ref 15–37)
BASOPHILS # BLD: 0 K/UL (ref 0–0.1)
BASOPHILS NFR BLD: 0 % (ref 0–1)
BILIRUB SERPL-MCNC: 0.4 MG/DL (ref 0.2–1)
BUN SERPL-MCNC: 10 MG/DL (ref 6–20)
BUN/CREAT SERPL: 18 (ref 12–20)
CALCIUM SERPL-MCNC: 7.7 MG/DL (ref 8.5–10.1)
CHLORIDE SERPL-SCNC: 100 MMOL/L (ref 97–108)
CO2 SERPL-SCNC: 33 MMOL/L (ref 21–32)
CREAT SERPL-MCNC: 0.55 MG/DL (ref 0.7–1.3)
EOSINOPHIL # BLD: 0.2 K/UL (ref 0–0.4)
EOSINOPHIL NFR BLD: 2 % (ref 0–7)
ERYTHROCYTE [DISTWIDTH] IN BLOOD BY AUTOMATED COUNT: 15 % (ref 11.5–14.5)
GLOBULIN SER CALC-MCNC: 3.7 G/DL (ref 2–4)
GLUCOSE BLD STRIP.AUTO-MCNC: 140 MG/DL (ref 65–100)
GLUCOSE BLD STRIP.AUTO-MCNC: 141 MG/DL (ref 65–100)
GLUCOSE BLD STRIP.AUTO-MCNC: 142 MG/DL (ref 65–100)
GLUCOSE BLD STRIP.AUTO-MCNC: 182 MG/DL (ref 65–100)
GLUCOSE SERPL-MCNC: 122 MG/DL (ref 65–100)
HCT VFR BLD AUTO: 23.6 % (ref 36.6–50.3)
HGB BLD-MCNC: 7.5 G/DL (ref 12.1–17)
LYMPHOCYTES # BLD: 1 K/UL (ref 0.8–3.5)
LYMPHOCYTES NFR BLD: 13 % (ref 12–49)
MAGNESIUM SERPL-MCNC: 1.6 MG/DL (ref 1.6–2.4)
MCH RBC QN AUTO: 31.4 PG (ref 26–34)
MCHC RBC AUTO-ENTMCNC: 31.8 G/DL (ref 30–36.5)
MCV RBC AUTO: 98.7 FL (ref 80–99)
MONOCYTES # BLD: 0.9 K/UL (ref 0–1)
MONOCYTES NFR BLD: 13 % (ref 5–13)
NEUTS SEG # BLD: 5.3 K/UL (ref 1.8–8)
NEUTS SEG NFR BLD: 72 % (ref 32–75)
PLATELET # BLD AUTO: 230 K/UL (ref 150–400)
POTASSIUM SERPL-SCNC: 3.8 MMOL/L (ref 3.5–5.1)
PROT SERPL-MCNC: 5.1 G/DL (ref 6.4–8.2)
RBC # BLD AUTO: 2.39 M/UL (ref 4.1–5.7)
SERVICE CMNT-IMP: ABNORMAL
SODIUM SERPL-SCNC: 136 MMOL/L (ref 136–145)
WBC # BLD AUTO: 7.4 K/UL (ref 4.1–11.1)

## 2017-08-27 PROCEDURE — 80053 COMPREHEN METABOLIC PANEL: CPT | Performed by: INTERNAL MEDICINE

## 2017-08-27 PROCEDURE — 74011250636 HC RX REV CODE- 250/636: Performed by: INTERNAL MEDICINE

## 2017-08-27 PROCEDURE — 74011000250 HC RX REV CODE- 250: Performed by: INTERNAL MEDICINE

## 2017-08-27 PROCEDURE — 74011250637 HC RX REV CODE- 250/637: Performed by: NURSE PRACTITIONER

## 2017-08-27 PROCEDURE — 74011250636 HC RX REV CODE- 250/636: Performed by: NURSE PRACTITIONER

## 2017-08-27 PROCEDURE — 74011000250 HC RX REV CODE- 250: Performed by: NURSE PRACTITIONER

## 2017-08-27 PROCEDURE — 36415 COLL VENOUS BLD VENIPUNCTURE: CPT | Performed by: INTERNAL MEDICINE

## 2017-08-27 PROCEDURE — 94640 AIRWAY INHALATION TREATMENT: CPT

## 2017-08-27 PROCEDURE — 74011250637 HC RX REV CODE- 250/637: Performed by: INTERNAL MEDICINE

## 2017-08-27 PROCEDURE — 65660000000 HC RM CCU STEPDOWN

## 2017-08-27 PROCEDURE — 74011636637 HC RX REV CODE- 636/637: Performed by: INTERNAL MEDICINE

## 2017-08-27 PROCEDURE — 77030018846 HC SOL IRR STRL H20 ICUM -A

## 2017-08-27 PROCEDURE — 82962 GLUCOSE BLOOD TEST: CPT

## 2017-08-27 PROCEDURE — 85025 COMPLETE CBC W/AUTO DIFF WBC: CPT | Performed by: INTERNAL MEDICINE

## 2017-08-27 PROCEDURE — 83735 ASSAY OF MAGNESIUM: CPT | Performed by: INTERNAL MEDICINE

## 2017-08-27 PROCEDURE — 77030011256 HC DRSG MEPILEX <16IN NO BORD MOLN -A

## 2017-08-27 PROCEDURE — 74011000258 HC RX REV CODE- 258: Performed by: NURSE PRACTITIONER

## 2017-08-27 PROCEDURE — 77010033678 HC OXYGEN DAILY

## 2017-08-27 RX ORDER — CHLORHEXIDINE GLUCONATE 1.2 MG/ML
15 RINSE ORAL EVERY 12 HOURS
Status: DISCONTINUED | OUTPATIENT
Start: 2017-08-27 | End: 2017-08-30 | Stop reason: HOSPADM

## 2017-08-27 RX ORDER — OXYCODONE AND ACETAMINOPHEN 5; 325 MG/1; MG/1
1 TABLET ORAL
Status: DISCONTINUED | OUTPATIENT
Start: 2017-08-27 | End: 2017-08-29

## 2017-08-27 RX ORDER — IPRATROPIUM BROMIDE AND ALBUTEROL SULFATE 2.5; .5 MG/3ML; MG/3ML
3 SOLUTION RESPIRATORY (INHALATION)
Status: DISCONTINUED | OUTPATIENT
Start: 2017-08-27 | End: 2017-08-30 | Stop reason: HOSPADM

## 2017-08-27 RX ORDER — GABAPENTIN 300 MG/1
600 CAPSULE ORAL 3 TIMES DAILY
Status: DISCONTINUED | OUTPATIENT
Start: 2017-08-27 | End: 2017-08-30 | Stop reason: HOSPADM

## 2017-08-27 RX ADMIN — MIDODRINE HYDROCHLORIDE 5 MG: 5 TABLET ORAL at 09:45

## 2017-08-27 RX ADMIN — IPRATROPIUM BROMIDE AND ALBUTEROL SULFATE 3 ML: .5; 3 SOLUTION RESPIRATORY (INHALATION) at 07:52

## 2017-08-27 RX ADMIN — VANCOMYCIN HYDROCHLORIDE 1250 MG: 10 INJECTION, POWDER, LYOPHILIZED, FOR SOLUTION INTRAVENOUS at 09:55

## 2017-08-27 RX ADMIN — GABAPENTIN 600 MG: 300 CAPSULE ORAL at 12:49

## 2017-08-27 RX ADMIN — CASTOR OIL AND BALSAM, PERU: 788; 87 OINTMENT TOPICAL at 09:46

## 2017-08-27 RX ADMIN — Medication 1 CAPSULE: at 09:45

## 2017-08-27 RX ADMIN — IPRATROPIUM BROMIDE AND ALBUTEROL SULFATE 3 ML: .5; 3 SOLUTION RESPIRATORY (INHALATION) at 14:32

## 2017-08-27 RX ADMIN — TAMSULOSIN HYDROCHLORIDE 0.8 MG: 0.4 CAPSULE ORAL at 09:43

## 2017-08-27 RX ADMIN — GABAPENTIN 600 MG: 300 CAPSULE ORAL at 15:19

## 2017-08-27 RX ADMIN — ASPIRIN 81 MG: 81 TABLET, COATED ORAL at 09:45

## 2017-08-27 RX ADMIN — RANOLAZINE 500 MG: 500 TABLET, FILM COATED, EXTENDED RELEASE ORAL at 17:04

## 2017-08-27 RX ADMIN — CEFEPIME HYDROCHLORIDE 2 G: 2 INJECTION, POWDER, FOR SOLUTION INTRAVENOUS at 09:45

## 2017-08-27 RX ADMIN — CEFEPIME HYDROCHLORIDE 2 G: 2 INJECTION, POWDER, FOR SOLUTION INTRAVENOUS at 01:41

## 2017-08-27 RX ADMIN — Medication 10 ML: at 22:36

## 2017-08-27 RX ADMIN — DULOXETINE HYDROCHLORIDE 30 MG: 30 CAPSULE, DELAYED RELEASE ORAL at 09:44

## 2017-08-27 RX ADMIN — OXYCODONE HYDROCHLORIDE AND ACETAMINOPHEN 1 TABLET: 5; 325 TABLET ORAL at 12:05

## 2017-08-27 RX ADMIN — PANTOPRAZOLE SODIUM 40 MG: 40 TABLET, DELAYED RELEASE ORAL at 09:44

## 2017-08-27 RX ADMIN — OXYCODONE HYDROCHLORIDE AND ACETAMINOPHEN 1 TABLET: 5; 325 TABLET ORAL at 15:19

## 2017-08-27 RX ADMIN — LIDOCAINE HYDROCHLORIDE 5 ML: 20 SOLUTION ORAL; TOPICAL at 07:40

## 2017-08-27 RX ADMIN — Medication 10 ML: at 14:00

## 2017-08-27 RX ADMIN — ISOSORBIDE MONONITRATE 30 MG: 30 TABLET, EXTENDED RELEASE ORAL at 09:44

## 2017-08-27 RX ADMIN — CHLORHEXIDINE GLUCONATE 15 ML: 1.2 RINSE ORAL at 15:20

## 2017-08-27 RX ADMIN — DOCUSATE SODIUM AND SENNOSIDES 1 TABLET: 8.6; 5 TABLET, FILM COATED ORAL at 17:05

## 2017-08-27 RX ADMIN — OXYCODONE HYDROCHLORIDE AND ACETAMINOPHEN 1 TABLET: 5; 325 TABLET ORAL at 06:17

## 2017-08-27 RX ADMIN — CHLORHEXIDINE GLUCONATE 15 ML: 1.2 RINSE ORAL at 22:36

## 2017-08-27 RX ADMIN — GABAPENTIN 600 MG: 300 CAPSULE ORAL at 22:36

## 2017-08-27 RX ADMIN — MIDODRINE HYDROCHLORIDE 5 MG: 5 TABLET ORAL at 17:04

## 2017-08-27 RX ADMIN — POTASSIUM CHLORIDE 20 MEQ: 750 TABLET, FILM COATED, EXTENDED RELEASE ORAL at 09:45

## 2017-08-27 RX ADMIN — ATORVASTATIN CALCIUM 10 MG: 10 TABLET, FILM COATED ORAL at 09:44

## 2017-08-27 RX ADMIN — GABAPENTIN 600 MG: 300 CAPSULE ORAL at 09:44

## 2017-08-27 RX ADMIN — INSULIN LISPRO 2 UNITS: 100 INJECTION, SOLUTION INTRAVENOUS; SUBCUTANEOUS at 12:49

## 2017-08-27 RX ADMIN — ENOXAPARIN SODIUM 40 MG: 40 INJECTION SUBCUTANEOUS at 09:43

## 2017-08-27 RX ADMIN — LIDOCAINE HYDROCHLORIDE 5 ML: 20 SOLUTION ORAL; TOPICAL at 12:50

## 2017-08-27 RX ADMIN — MIDODRINE HYDROCHLORIDE 5 MG: 5 TABLET ORAL at 12:49

## 2017-08-27 RX ADMIN — RANOLAZINE 500 MG: 500 TABLET, FILM COATED, EXTENDED RELEASE ORAL at 09:45

## 2017-08-27 RX ADMIN — Medication 400 MG: at 09:44

## 2017-08-27 RX ADMIN — LEVOTHYROXINE SODIUM 50 MCG: 50 TABLET ORAL at 09:44

## 2017-08-27 RX ADMIN — Medication 10 ML: at 02:58

## 2017-08-27 RX ADMIN — MONTELUKAST SODIUM 10 MG: 10 TABLET, FILM COATED ORAL at 09:44

## 2017-08-27 RX ADMIN — DOCUSATE SODIUM AND SENNOSIDES 1 TABLET: 8.6; 5 TABLET, FILM COATED ORAL at 09:44

## 2017-08-27 RX ADMIN — POLYETHYLENE GLYCOL 3350 17 G: 17 POWDER, FOR SOLUTION ORAL at 09:45

## 2017-08-27 NOTE — PROGRESS NOTES
Problem: Falls - Risk of  Goal: *Absence of Falls  Document Maximino Fall Risk and appropriate interventions in the flowsheet.    Outcome: Progressing Towards Goal  Fall Risk Interventions:  Mobility Interventions: Assess mobility with egress test, Bed/chair exit alarm, Patient to call before getting OOB, PT Consult for assist device competence, Strengthening exercises (ROM-active/passive), PT Consult for mobility concerns, OT consult for ADLs, Communicate number of staff needed for ambulation/transfer     Mentation Interventions: Adequate sleep, hydration, pain control, Bed/chair exit alarm, Door open when patient unattended, Familiar objects from home, Toileting rounds, Reorient patient, More frequent rounding, Gait belt with transfers/ambulation, Eyeglasses and hearing aids, Update white board, Room close to nurse's station, Increase mobility, Evaluate medications/consider consulting pharmacy     Medication Interventions: Bed/chair exit alarm, Patient to call before getting OOB, Teach patient to arise slowly, Evaluate medications/consider consulting pharmacy     Elimination Interventions: Bed/chair exit alarm, Call light in reach, Elevated toilet seat, Toileting schedule/hourly rounds, Toilet paper/wipes in reach, Patient to call for help with toileting needs, Urinal in reach     History of Falls Interventions: Bed/chair exit alarm, Evaluate medications/consider consulting pharmacy, Room close to nurse's station, Investigate reason for fall, Door open when patient unattended

## 2017-08-27 NOTE — HOSPICE
[facility] follow up on consultation visit note    Order for consult noted. History and events of this hospitalization reviewed. Note from Dr. Samantha Gomez of today reviewed. Family is not interested in stopping aggressive care at this time. Please call [phone] if we can be a part of the care for this gentleman and his family.     Christine Hall RN

## 2017-08-27 NOTE — PROGRESS NOTES
PCU SHIFT NURSING NOTE      Bedside and Verbal shift change report given to Pelon Dougherty RN (oncoming nurse) by Harleen Armando RN (offgoing nurse). Report included the following information SBAR, Kardex, ED Summary, Procedure Summary, Intake/Output, MAR, Recent Results, Med Rec Status, Cardiac Rhythm NSR and Alarm Parameters . Shift Summary:         Admission Date 8/17/2017   Admission Diagnosis Abdominal pain  Elevated troponin   Consults IP CONSULT TO CARDIOLOGY  IP CONSULT TO ORTHOPEDIC SURGERY  IP CONSULT TO PALLIATIVE CARE - PROVIDER  IP CONSULT TO PULMONOLOGY        Consults   [x]PT   [x]OT   [x]Speech   [x]Case Management      [x] Palliative      Cardiac Monitoring Order   [x]Yes   []No     IV drips   []Yes    Drip:                            Dose:  Drip:                            Dose:  Drip:                            Dose:   [x]No     GI Prophylaxis   [x]Yes   []No         DVT Prophylaxis   SCDs:             Jesus stockings:         [x] Medication   []Contraindicated   []None      Activity Level Activity Level: Bed Rest     Activity Assistance: Complete care   Purposeful Rounding every 1-2 hour? [x]Yes   Miller Score  Total Score: 3   Bed Alarm (If score 3 or >)   [x]Yes   [] Refused (See signed refusal form in chart)   Kwan Score  Kwan Score: 12   Kwan Score (if score 14 or less)   [x]PMT consult   [x]Wound Care consult      [x]Specialty bed   [x] Nutrition consult          Needs prior to discharge:   Home O2 required:    [x]Yes   []No    If yes, how much O2 required?     Other:    Last Bowel Movement: Last Bowel Movement Date: 08/26/17      Influenza Vaccine Received Flu Vaccine for Current Season (usually Sept-March): Not Flu Season        Pneumonia Vaccine           Diet Active Orders   Diet    DIET DYSPHAGIA ADVANCED SOFT (NDD3)      LDAs           Triple Lumen 08/17/17 Right;Upper Subclavian (Active)   Central Line Being Utilized Yes 8/27/2017  7:20 AM   Criteria for Appropriate Use Limited/no vessel suitable for conventional peripheral access 8/27/2017  7:20 AM   Site Assessment Clean, dry, & intact 8/27/2017  7:20 AM   Infiltration Assessment 0 8/27/2017  7:20 AM   Affected Extremity/Extremities Color distal to insertion site pink (or appropriate for race) 8/27/2017  7:20 AM   Date of Last Dressing Change 08/24/17 8/27/2017  7:20 AM   Dressing Status Clean, dry, & intact 8/27/2017  7:20 AM   Dressing Type Disk with Chlorhexadine gluconate (CHG); Transparent 8/27/2017  7:20 AM   Action Taken Open ports on tubing capped 8/27/2017  7:20 AM   Proximal Hub Color/Line Status Brown;Capped;Flushed 8/27/2017  7:20 AM   Positive Blood Return (Medial Site) Yes 8/27/2017  7:20 AM   Medial Hub Color/Line Status Blue;Capped;Flushed 8/27/2017  7:20 AM   Positive Blood Return (Lateral Site) Yes 8/27/2017  7:20 AM   Distal Hub Color/Line Status White;Capped;Flushed 8/27/2017  7:20 AM   Positive Blood Return (Site #3) Yes 8/27/2017  7:20 AM   External Catheter Length (cm) 5 centimeters 8/17/2017  3:01 PM   Alcohol Cap Used Yes 8/27/2017  7:20 AM              Condom Catheter 08/20/17 (Active)   Criteria for Appropriate Use Multiple Stage 3 or 4  pressure ulcers, chest to knees 8/27/2017  7:20 AM   Status Draining 8/27/2017  7:20 AM   Site Condition No abnormalities 8/27/2017  7:20 AM   Drainage Tube Clipped to Bed Yes 8/27/2017  7:20 AM   Catheter Secured to Thigh Yes 8/27/2017  7:20 AM   Tamper Seal Intact Yes 8/27/2017  7:20 AM   Bag Below Bladder/Not on Floor Yes 8/27/2017  7:20 AM   Lack of Dependent Loop in Tubing Yes 8/27/2017  7:20 AM   Drainage Bag Less Than Half Full Yes 8/27/2017  7:20 AM   Sterile Solution Used for  Irrigation N/A 8/27/2017  7:20 AM   Urine Output (mL) 350 ml 8/27/2017  7:20 AM                Urinary Catheter Condom Catheter 08/20/17-Criteria for Appropriate Use: Multiple Stage 3 or 4  pressure ulcers, chest to knees    Intake & Output   Date 08/26/17 0700 - 08/27/17 0659 08/27/17 0700 - 08/28/17 0659   Shift 4098-3799 5348-3985 24 Hour Total 6241-6740 7742-4531 24 Hour Total   I  N  T  A  K  E   I.V.  (mL/kg/hr) 450  (0.4) 100  (0.1) 550  (0.2) 0  0      Volume (dextrose 10% infusion 125-250 mL)    0  0      Volume (vancomycin (VANCOCIN) 1250 mg in  ml infusion) 250 0 250 0  0      Volume (cefepime (MAXIPIME) 2 g in 0.9% sodium chloride (MBP/ADV) 100 mL) 200 100 300 0  0      Volume (vancomycin (VANCOCIN) 1250 mg in  ml infusion) 0  0       Shift Total  (mL/kg) 450  (4.2) 100  (1) 550  (5.7) 0  (0)  0  (0)   O  U  T  P  U  T   Urine  (mL/kg/hr) 1075  (0.8) 550  (0.5) 1625  (0.7) 350  350      Urine Output (mL) (Condom Catheter 08/20/17) 1377 209 0390 350  350    Shift Total  (mL/kg) 1075  (10.1) 550  (5.7) 1625  (16.9) 350  (3.6)  350  (3.6)   NET -625 -450 -1075 -350  -350   Weight (kg) 106.3 96.1 96.1 96.1 96.1 96.1         Readmission Risk Assessment Tool Score High Risk            33       Total Score        3 Has Seen PCP in Last 6 Months (Yes=3, No=0)    2 . Living with Significant Other. Assisted Living. LTAC. SNF. or   Rehab    3 Patient Length of Stay (>5 days = 3)    4 IP Visits Last 12 Months (1-3=4, 4=9, >4=11)    5 Pt.  Coverage (Medicare=5 , Medicaid, or Self-Pay=4)    16 Charlson Comorbidity Score (Age + Comorbid Conditions)       Expected Length of Stay 1d 21h   Actual Length of Stay 10

## 2017-08-27 NOTE — PROGRESS NOTES
Hospitalist Progress Note    NAME: Vida Le   :  1932   MRN:  [id number]       Interim Hospital Summary: 80 y.o. male whom presented on 2017 with      Assessment / Plan:  HCAP  Likely aspiration pneumonia  - CXR revaled Left upper lobe atelectasis/consolidation. - continue with IV Cefepime and Vanc (completed 10 days). Pt developed severe allergic reaction after taking levofloxacin which resulted severe canker sores. - O2 Sat 96% at 2L of O2 via n/c; wean O2 as tolerate  CXR still showing mass like atelectatic area in left lung   Canker sores: resolved, c/w peridex. Chest Pain  Elevated Troponin  History of CAD s/p remote PCI  Stress Test negative on 17  Hyperlipidemia'  Chronic Systolic Heart Failure: (may be end stage Cardiomyopathy)  - discussed with the family and pt yesterday in regards of code status and goals of care. Patient and the family requested continue with current care, do not wish to have any invasive procedures.  Pt and the family asked for optimal pain control. Will consult palliative team to follow. - no further chest pain since we added Ranexa   - EKG with TWI in V1-V5  - troponin went up to 0.55 from 0.32  - cardiology following; communicated with cardiology team on pt's wish. Conservative medical management. Cardiology team will follow as need basis  - Echo: Ejection fraction was estimated in the range of 15 % to 20 %  - continue ASA, BB, Ranexa, & IMDUR  - continue statin, CXR not in pulmonary edema, not able to tolerate diuretics due to low BP. Hypotension: becoming hypotensive every day in the afternoon requiring IVF boluses, so far improved on Midodrine, monitor. Abdominal Pain: now resolved  Nausea/vomiting  - CT Abdomen/Pelvis: Distended gallbladder. Infrarenal abdominal aortic aneurysm measures 5.3 x 5 cm (no changes from previous images), Colonic diverticulosis. - Abdominal ultrasound:Sludge layers dependently in the nondistended gallbladder.  No wall thickening or pericholecystic fluid.    - Avoid NSAID  - IV PPI & IV antiemetics  - stop diclofenac  Chronic Impacted Left Femoral Head Fracture: ideally needs total hip replacement to control pain  - D/erika  fentanyl patch  May be contributing to hypotension and excessive sedation  - scheduled Acetaminophen  - prn Oxycodone or dilaudid, valium as need for severe muscle spasm  - Dr. Katerina Arriola spoke with Dr. Kirti Das in Radiology to get addendum added on to patient CT A/P which was preformed at [facility] detailing patient's left hip pathology  - ortho following; pain management. IR for aspiration to r/o septic joint  Sacral Decub:  - consult wound care; STAGE 4 decubitus ulcer  Type 2 diabetes mellitus without complication, without long-term current use of insulin   Diabetic polyneuropathy  - hold home oral antihyperglycemics and place on SSI  - Hgb A1c 6.6  - decrease dose of  Gabapentin  Abdominal aortic aneurysm (AAA)   - stable on today's CT:Infrarenal abdominal aortic aneurysm measures 5.3 x 5 cm  Obesity  Severe Protein Calorie Malnutrition  -consult Nutrition  Hypokalemia: replace and monitor. Hypomagnesemia: replace and monitor. Constipation: already on laxatives, no BM, give Enemas  Code Status: DNR   DVT Prophylaxis: Lovenox  GI Prophylaxis: PPI as above   Baseline: Bed bound for last 3 months per patient report  Recommended Disposition:SNF     Apparently family not ready for Hospice yet, monitor. But wife can not care for him at home may need SNF on Comfort bed or Hospice     Subjective:     Chief Complaint / Reason for Physician Visit  \"I feel good\". Discussed with RN events overnight.      Review of Systems:  Symptom Y/N Comments  Symptom Y/N Comments   Fever/Chills    Chest Pain     Poor Appetite    Edema n    Cough    Abdominal Pain     Sputum    Joint Pain y    SOB/LEWIS    Pruritis/Rash     Nausea/vomit    Tolerating PT/OT     Diarrhea    Tolerating Diet y    Constipation    Other       Could NOT obtain due to:      Objective:     VITALS:   Last 24hrs VS reviewed since prior progress note. Most recent are:  Patient Vitals for the past 24 hrs:   Temp Pulse Resp BP SpO2   08/27/17 1127 98 °F (36.7 °C) 74 18 124/58 98 %   08/27/17 0752 - - - - 99 %   08/27/17 0720 97.9 °F (36.6 °C) 86 16 109/63 97 %   08/27/17 0246 97.6 °F (36.4 °C) 77 16 111/51 96 %   08/26/17 2300 98.3 °F (36.8 °C) 80 18 115/54 98 %   08/26/17 1936 97.4 °F (36.3 °C) 88 20 104/51 97 %   08/26/17 1929 - - - - 96 %   08/26/17 1800 98.3 °F (36.8 °C) 84 20 - 97 %   08/26/17 1653 98.3 °F (36.8 °C) 81 20 104/48 97 %       Intake/Output Summary (Last 24 hours) at 08/27/17 1404  Last data filed at 08/27/17 1000   Gross per 24 hour   Intake              800 ml   Output             1250 ml   Net             -450 ml        PHYSICAL EXAM:  General: WD, WN.awake, no acute distress    EENT:  EOMI. Anicteric sclerae. MMM  Resp:  Coarse BS, rhonchi  CV:  Regular  rhythm,  No edema  GI:  Soft, obese, Non tender.  +Bowel sounds  Neurologic:  Awake, oriented x2. normal speech, GCS [id number]  Psych:   Fair insight. Not anxious nor agitated  Skin:  No rashes. No jaundice    Reviewed most current lab test results and cultures  YES  Reviewed most current radiology test results   YES  Review and summation of old records today    NO  Reviewed patient's current orders and MAR    YES  PMH/SH reviewed - no change compared to H&P  ________________________________________________________________________  Care Plan discussed with:    Comments   Patient y    Family  y wife   RN y    Care Manager y    Consultant  y Ortho                    y Multidiciplinary team rounds were held today with , nursing, pharmacist and clinical coordinator. Patient's plan of care was discussed; medications were reviewed and discharge planning was addressed.      ________________________________________________________________________  Total NON critical care TIME:  20   Minutes    Total CRITICAL CARE TIME Spent:   Minutes non procedure based      Comments   >50% of visit spent in counseling and coordination of care y    ________________________________________________________________________  Maryjo Figueroa MD     Procedures: see electronic medical records for all procedures/Xrays and details which were not copied into this note but were reviewed prior to creation of Plan. LABS:  I reviewed today's most current labs and imaging studies.   Pertinent labs include:  Recent Labs      08/27/17   0255 08/26/17   0236  08/25/17   0241   WBC  7.4  10.7  10.6   HGB  7.5*  8.2*  8.2*   HCT  23.6*  25.5*  25.3*   PLT  230  257  230     Recent Labs      08/27/17 0255 08/26/17 0236  08/25/17   0241   NA  136  134*  134*   K  3.8  3.7  3.6   CL  100  99  98   CO2  33*  33*  34*   GLU  122*  114*  112*   BUN  10  10  8   CREA  0.55*  0.58*  0.44*   CA  7.7*  7.4*  7.1*   MG  1.6  1.6  1.6   ALB  1.4*  1.5*  1.5*   TBILI  0.4  0.4  0.4   SGOT  12*  14*  15   ALT  8*  8*  9*       Signed: )Maryjo Figueroa MD

## 2017-08-28 ENCOUNTER — APPOINTMENT (OUTPATIENT)
Dept: GENERAL RADIOLOGY | Age: 82
DRG: 177 | End: 2017-08-28
Attending: INTERNAL MEDICINE
Payer: MEDICARE

## 2017-08-28 PROBLEM — I95.0 IDIOPATHIC HYPOTENSION: Status: ACTIVE | Noted: 2017-08-28

## 2017-08-28 LAB
ALBUMIN SERPL-MCNC: 1.5 G/DL (ref 3.5–5)
ALBUMIN/GLOB SERPL: 0.4 {RATIO} (ref 1.1–2.2)
ALP SERPL-CCNC: 84 U/L (ref 45–117)
ALT SERPL-CCNC: 10 U/L (ref 12–78)
ANION GAP SERPL CALC-SCNC: 4 MMOL/L (ref 5–15)
AST SERPL-CCNC: 23 U/L (ref 15–37)
BASOPHILS # BLD: 0 K/UL (ref 0–0.1)
BASOPHILS NFR BLD: 0 % (ref 0–1)
BILIRUB SERPL-MCNC: 0.5 MG/DL (ref 0.2–1)
BUN SERPL-MCNC: 10 MG/DL (ref 6–20)
BUN/CREAT SERPL: 19 (ref 12–20)
CALCIUM SERPL-MCNC: 7.8 MG/DL (ref 8.5–10.1)
CHLORIDE SERPL-SCNC: 97 MMOL/L (ref 97–108)
CO2 SERPL-SCNC: 35 MMOL/L (ref 21–32)
CREAT SERPL-MCNC: 0.52 MG/DL (ref 0.7–1.3)
EOSINOPHIL # BLD: 0.2 K/UL (ref 0–0.4)
EOSINOPHIL NFR BLD: 3 % (ref 0–7)
ERYTHROCYTE [DISTWIDTH] IN BLOOD BY AUTOMATED COUNT: 15.2 % (ref 11.5–14.5)
GLOBULIN SER CALC-MCNC: 4 G/DL (ref 2–4)
GLUCOSE BLD STRIP.AUTO-MCNC: 135 MG/DL (ref 65–100)
GLUCOSE BLD STRIP.AUTO-MCNC: 135 MG/DL (ref 65–100)
GLUCOSE BLD STRIP.AUTO-MCNC: 157 MG/DL (ref 65–100)
GLUCOSE BLD STRIP.AUTO-MCNC: 168 MG/DL (ref 65–100)
GLUCOSE SERPL-MCNC: 110 MG/DL (ref 65–100)
HCT VFR BLD AUTO: 25 % (ref 36.6–50.3)
HGB BLD-MCNC: 8 G/DL (ref 12.1–17)
LYMPHOCYTES # BLD: 1 K/UL (ref 0.8–3.5)
LYMPHOCYTES NFR BLD: 15 % (ref 12–49)
MAGNESIUM SERPL-MCNC: 1.5 MG/DL (ref 1.6–2.4)
MCH RBC QN AUTO: 32 PG (ref 26–34)
MCHC RBC AUTO-ENTMCNC: 32 G/DL (ref 30–36.5)
MCV RBC AUTO: 100 FL (ref 80–99)
MONOCYTES # BLD: 0.9 K/UL (ref 0–1)
MONOCYTES NFR BLD: 13 % (ref 5–13)
NEUTS SEG # BLD: 4.6 K/UL (ref 1.8–8)
NEUTS SEG NFR BLD: 69 % (ref 32–75)
PLATELET # BLD AUTO: 255 K/UL (ref 150–400)
POTASSIUM SERPL-SCNC: 4.1 MMOL/L (ref 3.5–5.1)
PROT SERPL-MCNC: 5.5 G/DL (ref 6.4–8.2)
RBC # BLD AUTO: 2.5 M/UL (ref 4.1–5.7)
SERVICE CMNT-IMP: ABNORMAL
SODIUM SERPL-SCNC: 136 MMOL/L (ref 136–145)
WBC # BLD AUTO: 6.7 K/UL (ref 4.1–11.1)

## 2017-08-28 PROCEDURE — 71010 XR CHEST PORT: CPT

## 2017-08-28 PROCEDURE — 74011250636 HC RX REV CODE- 250/636: Performed by: INTERNAL MEDICINE

## 2017-08-28 PROCEDURE — 65660000000 HC RM CCU STEPDOWN

## 2017-08-28 PROCEDURE — 85025 COMPLETE CBC W/AUTO DIFF WBC: CPT | Performed by: INTERNAL MEDICINE

## 2017-08-28 PROCEDURE — 83735 ASSAY OF MAGNESIUM: CPT | Performed by: INTERNAL MEDICINE

## 2017-08-28 PROCEDURE — 80053 COMPREHEN METABOLIC PANEL: CPT | Performed by: INTERNAL MEDICINE

## 2017-08-28 PROCEDURE — 74011250637 HC RX REV CODE- 250/637: Performed by: INTERNAL MEDICINE

## 2017-08-28 PROCEDURE — 77010033678 HC OXYGEN DAILY

## 2017-08-28 PROCEDURE — 36415 COLL VENOUS BLD VENIPUNCTURE: CPT | Performed by: INTERNAL MEDICINE

## 2017-08-28 PROCEDURE — 97530 THERAPEUTIC ACTIVITIES: CPT

## 2017-08-28 PROCEDURE — 74011636637 HC RX REV CODE- 636/637: Performed by: INTERNAL MEDICINE

## 2017-08-28 PROCEDURE — 82962 GLUCOSE BLOOD TEST: CPT

## 2017-08-28 PROCEDURE — 36591 DRAW BLOOD OFF VENOUS DEVICE: CPT

## 2017-08-28 PROCEDURE — 77030011256 HC DRSG MEPILEX <16IN NO BORD MOLN -A

## 2017-08-28 PROCEDURE — 74011250637 HC RX REV CODE- 250/637: Performed by: NURSE PRACTITIONER

## 2017-08-28 RX ORDER — FUROSEMIDE 20 MG/1
20 TABLET ORAL DAILY
Status: DISCONTINUED | OUTPATIENT
Start: 2017-08-28 | End: 2017-08-30 | Stop reason: HOSPADM

## 2017-08-28 RX ORDER — HYDROMORPHONE HYDROCHLORIDE 1 MG/ML
0.2 INJECTION, SOLUTION INTRAMUSCULAR; INTRAVENOUS; SUBCUTANEOUS
Status: DISCONTINUED | OUTPATIENT
Start: 2017-08-28 | End: 2017-08-29

## 2017-08-28 RX ORDER — MAGNESIUM CITRATE
296 SOLUTION, ORAL ORAL
Status: COMPLETED | OUTPATIENT
Start: 2017-08-28 | End: 2017-08-28

## 2017-08-28 RX ORDER — FACIAL-BODY WIPES
10 EACH TOPICAL DAILY PRN
Status: DISCONTINUED | OUTPATIENT
Start: 2017-08-28 | End: 2017-08-30 | Stop reason: HOSPADM

## 2017-08-28 RX ORDER — LANOLIN ALCOHOL/MO/W.PET/CERES
400 CREAM (GRAM) TOPICAL 2 TIMES DAILY
Status: DISCONTINUED | OUTPATIENT
Start: 2017-08-28 | End: 2017-08-30 | Stop reason: HOSPADM

## 2017-08-28 RX ADMIN — GABAPENTIN 600 MG: 300 CAPSULE ORAL at 21:46

## 2017-08-28 RX ADMIN — Medication 10 ML: at 21:46

## 2017-08-28 RX ADMIN — ISOSORBIDE MONONITRATE 30 MG: 30 TABLET, EXTENDED RELEASE ORAL at 08:43

## 2017-08-28 RX ADMIN — INSULIN LISPRO 2 UNITS: 100 INJECTION, SOLUTION INTRAVENOUS; SUBCUTANEOUS at 17:33

## 2017-08-28 RX ADMIN — FUROSEMIDE 20 MG: 20 TABLET ORAL at 12:53

## 2017-08-28 RX ADMIN — MIDODRINE HYDROCHLORIDE 5 MG: 5 TABLET ORAL at 08:42

## 2017-08-28 RX ADMIN — ATORVASTATIN CALCIUM 10 MG: 10 TABLET, FILM COATED ORAL at 08:42

## 2017-08-28 RX ADMIN — Medication 1 CAPSULE: at 08:42

## 2017-08-28 RX ADMIN — OXYCODONE HYDROCHLORIDE AND ACETAMINOPHEN 1 TABLET: 5; 325 TABLET ORAL at 20:21

## 2017-08-28 RX ADMIN — OXYCODONE HYDROCHLORIDE AND ACETAMINOPHEN 1 TABLET: 5; 325 TABLET ORAL at 06:22

## 2017-08-28 RX ADMIN — BISACODYL 10 MG: 10 SUPPOSITORY RECTAL at 20:22

## 2017-08-28 RX ADMIN — RANOLAZINE 500 MG: 500 TABLET, FILM COATED, EXTENDED RELEASE ORAL at 08:42

## 2017-08-28 RX ADMIN — POLYETHYLENE GLYCOL 3350 17 G: 17 POWDER, FOR SOLUTION ORAL at 08:42

## 2017-08-28 RX ADMIN — Medication 400 MG: at 08:42

## 2017-08-28 RX ADMIN — TAMSULOSIN HYDROCHLORIDE 0.8 MG: 0.4 CAPSULE ORAL at 08:43

## 2017-08-28 RX ADMIN — Medication 10 ML: at 13:50

## 2017-08-28 RX ADMIN — INSULIN LISPRO 2 UNITS: 100 INJECTION, SOLUTION INTRAVENOUS; SUBCUTANEOUS at 12:53

## 2017-08-28 RX ADMIN — POTASSIUM CHLORIDE 20 MEQ: 750 TABLET, FILM COATED, EXTENDED RELEASE ORAL at 08:43

## 2017-08-28 RX ADMIN — DOCUSATE SODIUM AND SENNOSIDES 1 TABLET: 8.6; 5 TABLET, FILM COATED ORAL at 08:42

## 2017-08-28 RX ADMIN — CHLORHEXIDINE GLUCONATE 15 ML: 1.2 RINSE ORAL at 20:22

## 2017-08-28 RX ADMIN — PANTOPRAZOLE SODIUM 40 MG: 40 TABLET, DELAYED RELEASE ORAL at 08:43

## 2017-08-28 RX ADMIN — Medication 10 ML: at 04:22

## 2017-08-28 RX ADMIN — MIDODRINE HYDROCHLORIDE 5 MG: 5 TABLET ORAL at 17:27

## 2017-08-28 RX ADMIN — CASTOR OIL AND BALSAM, PERU: 788; 87 OINTMENT TOPICAL at 08:53

## 2017-08-28 RX ADMIN — MAGESIUM CITRATE 296 ML: 1.75 LIQUID ORAL at 12:53

## 2017-08-28 RX ADMIN — ASPIRIN 81 MG: 81 TABLET, COATED ORAL at 08:42

## 2017-08-28 RX ADMIN — Medication 400 MG: at 17:33

## 2017-08-28 RX ADMIN — GABAPENTIN 600 MG: 300 CAPSULE ORAL at 17:27

## 2017-08-28 RX ADMIN — MONTELUKAST SODIUM 10 MG: 10 TABLET, FILM COATED ORAL at 08:43

## 2017-08-28 RX ADMIN — ENOXAPARIN SODIUM 40 MG: 40 INJECTION SUBCUTANEOUS at 08:42

## 2017-08-28 RX ADMIN — DULOXETINE HYDROCHLORIDE 30 MG: 30 CAPSULE, DELAYED RELEASE ORAL at 08:42

## 2017-08-28 RX ADMIN — DOCUSATE SODIUM AND SENNOSIDES 1 TABLET: 8.6; 5 TABLET, FILM COATED ORAL at 17:33

## 2017-08-28 RX ADMIN — GABAPENTIN 600 MG: 300 CAPSULE ORAL at 08:43

## 2017-08-28 RX ADMIN — MIDODRINE HYDROCHLORIDE 5 MG: 5 TABLET ORAL at 12:53

## 2017-08-28 RX ADMIN — LEVOTHYROXINE SODIUM 50 MCG: 50 TABLET ORAL at 08:42

## 2017-08-28 RX ADMIN — OXYCODONE HYDROCHLORIDE AND ACETAMINOPHEN 1 TABLET: 5; 325 TABLET ORAL at 13:31

## 2017-08-28 RX ADMIN — RANOLAZINE 500 MG: 500 TABLET, FILM COATED, EXTENDED RELEASE ORAL at 17:33

## 2017-08-28 RX ADMIN — CHLORHEXIDINE GLUCONATE 15 ML: 1.2 RINSE ORAL at 08:52

## 2017-08-28 NOTE — PROGRESS NOTES
Problem: Mobility Impaired (Adult and Pediatric)  Goal: *Acute Goals and Plan of Care (Insert Text)  Physical Therapy Goals  Initiated 8/23/2017  1. Patient will move from supine to sit and sit to supine in bed with minimal assistance/contact guard assist within 7 day(s). 2. Patient will transfer from bed to chair and chair to bed with minimal assistance/contact guard assist using the least restrictive device within 7 day(s). 3. Patient will perform sit to stand with minimal assistance/contact guard assist within 7 day(s). 4. Patient will ambulate with moderate assistance for 50 feet with the least restrictive device within 7 day(s). PHYSICAL THERAPY TREATMENT  Patient: Grant Dunlap (67 y.o. male)  Date: 8/28/2017  Diagnosis: Abdominal pain  Elevated troponin <principal problem not specified>       Precautions: DNR, Contact, Fall, Skin (Stage 4 sacral; EF 15%; in bed x 3 mo PTA)      ASSESSMENT:  Patient continues with significant pain and impaired balance limiting participation. Patient required max assist x 2 this date for bed mobility. Patient sat EOB x 5 min with CGA-mod assist to maintain sitting balance. Patient with strong right lean today; max cues for correction. Patient likely with right lean due to pain in left hip and sacral wound. Did not attempt standing today due to poor sitting balance and c/o pain. Patient returned to supine position and positioned pillows for increased comfort. Recommend SNF at discharge. Progression toward goals:  [ ]    Improving appropriately and progressing toward goals  [X]    Improving slowly and progressing toward goals  [ ]    Not making progress toward goals and plan of care will be adjusted       PLAN:  Patient continues to benefit from skilled intervention to address the above impairments. Continue treatment per established plan of care.   Discharge Recommendations:  Abhi Kearns  Further Equipment Recommendations for Discharge:  none SUBJECTIVE:   Patient stated I remember you from before.       OBJECTIVE DATA SUMMARY:   Critical Behavior:  Neurologic State: Alert  Orientation Level: Appropriate for age  Cognition: Follows commands  Safety/Judgement: Decreased insight into deficits, Fall prevention  Functional Mobility Training:  Bed Mobility:  Rolling: Maximum assistance;Assist x2  Supine to Sit: Maximum assistance;Assist x2  Sit to Supine: Maximum assistance;Assist x2           Transfers:  Sit to Stand:  (not tested due to poor sitting balance)                                Balance:  Sitting: Impaired; With support  Sitting - Static: Poor (constant support) (right lateral lean)  Sitting - Dynamic: Poor (constant support)  Standing:  (not tested due to poor sitting balance)  Ambulation/Gait Training:      unable                  Neuro Re-Education:  Max tactile and verbal cues for maintaining midline control in sitting  Therapeutic Exercises:   LAQs in sitting x 5 reps  Pain:  Pain Scale 1: Numeric (0 - 10)  Pain Intensity 1: 6  Pain Location 1: Back  Pain Orientation 1: Lower  Pain Description 1: Aching  Pain Intervention(s) 1: Medication (see MAR)  Activity Tolerance:   SpO2 decreased to 80% in sitting (may not be accurate reading due to pressure on hand in sitting)  Please refer to the flowsheet for vital signs taken during this treatment.   After treatment:   [ ]    Patient left in no apparent distress sitting up in chair  [X]    Patient left in no apparent distress in bed  [X]    Call bell left within reach  [X]    Nursing notified  [ ]    Caregiver present  [ ]    Bed alarm activated      COMMUNICATION/COLLABORATION:   The patients plan of care was discussed with: Registered Nurse     Ambar Forbes, PT   Time Calculation: 20 mins

## 2017-08-28 NOTE — PROGRESS NOTES
PCU SHIFT NURSING NOTE      Bedside and Verbal shift change report given to Marly Das RN (oncoming nurse) by Clarice Haji RN (offgoing nurse). Report included the following information SBAR, Kardex, ED Summary, Procedure Summary, Intake/Output, MAR, Recent Results, Med Rec Status, Cardiac Rhythm NSR with 1 AVB and Alarm Parameters . Shift Summary:         Admission Date 8/17/2017   Admission Diagnosis Abdominal pain  Elevated troponin   Consults IP CONSULT TO CARDIOLOGY  IP CONSULT TO ORTHOPEDIC SURGERY  IP CONSULT TO PALLIATIVE CARE - PROVIDER  IP CONSULT TO PULMONOLOGY        Consults   [x]PT   [x]OT   [x]Speech   [x]Case Management      [x] Palliative      Cardiac Monitoring Order   [x]Yes   []No     IV drips   []Yes    Drip:                            Dose:  Drip:                            Dose:  Drip:                            Dose:   [x]No     GI Prophylaxis   [x]Yes   []No         DVT Prophylaxis   SCDs:             Jesus stockings:         [x] Medication   []Contraindicated   []None      Activity Level Activity Level: Bed Rest     Activity Assistance: Complete care   Purposeful Rounding every 1-2 hour? [x]Yes   Miller Score  Total Score: 3   Bed Alarm (If score 3 or >)   [x]Yes   [] Refused (See signed refusal form in chart)   Kwan Score  Kwan Score: 12   Kwan Score (if score 14 or less)   [x]PMT consult   [x]Wound Care consult      [x]Specialty bed   [x] Nutrition consult          Needs prior to discharge:   Home O2 required:    [x]Yes   []No    If yes, how much O2 required?     Other:    Last Bowel Movement: Last Bowel Movement Date: 08/26/17      Influenza Vaccine Received Flu Vaccine for Current Season (usually Sept-March): Not Flu Season        Pneumonia Vaccine           Diet Active Orders   Diet    DIET DYSPHAGIA ADVANCED SOFT (NDD3)      LDAs           Triple Lumen 08/17/17 Right;Upper Subclavian (Active)   Central Line Being Utilized Yes 8/28/2017  7:15 AM   Criteria for Appropriate Use Limited/no vessel suitable for conventional peripheral access 8/28/2017  7:15 AM   Site Assessment Clean, dry, & intact 8/28/2017  7:15 AM   Infiltration Assessment 0 8/28/2017  7:15 AM   Affected Extremity/Extremities Color distal to insertion site pink (or appropriate for race) 8/28/2017  7:15 AM   Date of Last Dressing Change 08/24/17 8/28/2017  7:15 AM   Dressing Status Clean, dry, & intact 8/28/2017  7:15 AM   Dressing Type Disk with Chlorhexadine gluconate (CHG); Transparent 8/28/2017  7:15 AM   Action Taken Open ports on tubing capped 8/28/2017  7:15 AM   Proximal Hub Color/Line Status Brown;Capped;Flushed 8/28/2017  7:15 AM   Positive Blood Return (Medial Site) Yes 8/28/2017  7:15 AM   Medial Hub Color/Line Status Blue;Capped;Flushed 8/28/2017  7:15 AM   Positive Blood Return (Lateral Site) Yes 8/28/2017  7:15 AM   Distal Hub Color/Line Status White;Capped;Flushed 8/28/2017  7:15 AM   Positive Blood Return (Site #3) Yes 8/28/2017  7:15 AM   External Catheter Length (cm) 5 centimeters 8/17/2017  3:01 PM   Alcohol Cap Used Yes 8/28/2017  7:15 AM              Condom Catheter 08/20/17 (Active)   Criteria for Appropriate Use Multiple Stage 3 or 4  pressure ulcers, chest to knees 8/28/2017  7:15 AM   Status Draining 8/28/2017  7:15 AM   Site Condition No abnormalities 8/28/2017  7:15 AM   Drainage Tube Clipped to Bed Yes 8/28/2017  7:15 AM   Catheter Secured to Thigh Yes 8/28/2017  7:15 AM   Tamper Seal Intact Yes 8/28/2017  7:15 AM   Bag Below Bladder/Not on Floor Yes 8/28/2017  7:15 AM   Lack of Dependent Loop in Tubing Yes 8/28/2017  7:15 AM   Drainage Bag Less Than Half Full Yes 8/28/2017  7:15 AM   Sterile Solution Used for  Irrigation N/A 8/28/2017  7:15 AM   Urine Output (mL) 400 ml 8/27/2017  2:00 PM                Urinary Catheter Condom Catheter 08/20/17-Criteria for Appropriate Use: Multiple Stage 3 or 4  pressure ulcers, chest to knees    Intake & Output   Date 08/27/17 0700 - 08/28/17 5526 08/28/17 0700 - 08/29/17 0659   Shift 4855-8559 3178-4753 24 Hour Total 9430-2345 7232-9150 24 Hour Total   I  N  T  A  K  E   I.V.  (mL/kg/hr) 350  (0.3)  350  (0.2)         Volume (dextrose 10% infusion 125-250 mL) 0  0         Volume (vancomycin (VANCOCIN) 1250 mg in  ml infusion) 250  250         Volume (cefepime (MAXIPIME) 2 g in 0.9% sodium chloride (MBP/ADV) 100 mL) 100  100       Shift Total  (mL/kg) 350  (3.6)  350  (3.6)      O  U  T  P  U  T   Urine  (mL/kg/hr) 900  (0.8)  900  (0.4)         Urine Output (mL) (Condom Catheter 08/20/17) 900  900       Shift Total  (mL/kg) 900  (9.4)  900  (9.4)      NET -550  -550      Weight (kg) 96.1 96.1 96.1 96.5 96.5 96.5         Readmission Risk Assessment Tool Score High Risk            33       Total Score        3 Has Seen PCP in Last 6 Months (Yes=3, No=0)    2 . Living with Significant Other. Assisted Living. LTAC. SNF. or   Rehab    3 Patient Length of Stay (>5 days = 3)    4 IP Visits Last 12 Months (1-3=4, 4=9, >4=11)    5 Pt.  Coverage (Medicare=5 , Medicaid, or Self-Pay=4)    16 Charlson Comorbidity Score (Age + Comorbid Conditions)       Expected Length of Stay 1d 21h   Actual Length of Stay 11

## 2017-08-28 NOTE — PROGRESS NOTES
PCU SHIFT NURSING NOTE      Bedside shift change report given to Luis Hutchinson RN (oncoming nurse) by Tiago Robles RN (offgoing nurse). Report included the following information Kardex, Procedure Summary, Intake/Output and Recent Results. Shift Summary:       Admission Date 8/17/2017   Admission Diagnosis Abdominal pain  Elevated troponin   Consults IP CONSULT TO CARDIOLOGY  IP CONSULT TO ORTHOPEDIC SURGERY  IP CONSULT TO PALLIATIVE CARE - PROVIDER  IP CONSULT TO PULMONOLOGY        Consults   []PT   []OT   []Speech   []Case Management      [] Palliative      Cardiac Monitoring Order   []Yes   []No     IV drips   []Yes    Drip:                            Dose:  Drip:                            Dose:  Drip:                            Dose:   []No     GI Prophylaxis   []Yes   []No         DVT Prophylaxis   SCDs:             Jesus stockings:         [] Medication   []Contraindicated   []None      Activity Level Activity Level: Bed Rest     Activity Assistance: Complete care   Purposeful Rounding every 1-2 hour? []Yes   Miller Score  Total Score: 3   Bed Alarm (If score 3 or >)   []Yes   [] Refused (See signed refusal form in chart)   Kwan Score  Kwan Score: 12   Kwan Score (if score 14 or less)   []PMT consult   []Wound Care consult      []Specialty bed   [] Nutrition consult          Needs prior to discharge:   Home O2 required:    []Yes   []No    If yes, how much O2 required?     Other:    Last Bowel Movement: Last Bowel Movement Date: 08/26/17      Influenza Vaccine Received Flu Vaccine for Current Season (usually Sept-March): Not Flu Season        Pneumonia Vaccine           Diet Active Orders   Diet    DIET DYSPHAGIA ADVANCED SOFT (NDD3)      LDAs           Triple Lumen 08/17/17 Right;Upper Subclavian (Active)   Central Line Being Utilized Yes 8/27/2017  8:00 PM   Criteria for Appropriate Use Limited/no vessel suitable for conventional peripheral access 8/27/2017  8:00 PM   Site Assessment Clean, dry, & intact 8/27/2017  8:00 PM   Infiltration Assessment 0 8/27/2017  8:00 PM   Affected Extremity/Extremities Color distal to insertion site pink (or appropriate for race) 8/27/2017  8:00 PM   Date of Last Dressing Change 08/24/17 8/27/2017  8:00 PM   Dressing Status Clean, dry, & intact 8/27/2017  8:00 PM   Dressing Type Disk with Chlorhexadine gluconate (CHG) 8/27/2017  8:00 PM   Action Taken Open ports on tubing capped 8/27/2017  8:00 PM   Proximal Hub Color/Line Status Desiree Shack; Infusing 8/27/2017  8:00 PM   Positive Blood Return (Medial Site) Yes 8/27/2017  8:00 PM   Medial Hub Color/Line Status Blue; Infusing 8/27/2017  8:00 PM   Positive Blood Return (Lateral Site) Yes 8/27/2017  2:00 PM   Distal Hub Color/Line Status White;Capped 8/27/2017  8:00 PM   Positive Blood Return (Site #3) Yes 8/27/2017  2:00 PM   External Catheter Length (cm) 5 centimeters 8/17/2017  3:01 PM   Alcohol Cap Used Yes 8/27/2017  2:00 PM              Condom Catheter 08/20/17 (Active)   Criteria for Appropriate Use Multiple Stage 3 or 4  pressure ulcers, chest to knees 8/27/2017  8:00 PM   Status Draining 8/27/2017  8:00 PM   Site Condition No abnormalities 8/27/2017  8:00 PM   Drainage Tube Clipped to Bed Yes 8/27/2017  8:00 PM   Catheter Secured to Thigh Yes 8/27/2017  8:00 PM   Tamper Seal Intact Yes 8/27/2017  8:00 PM   Bag Below Bladder/Not on Floor Yes 8/27/2017  8:00 PM   Lack of Dependent Loop in Tubing Yes 8/27/2017  8:00 PM   Drainage Bag Less Than Half Full Yes 8/27/2017  8:00 PM   Sterile Solution Used for  Irrigation N/A 8/27/2017  8:00 PM   Urine Output (mL) 400 ml 8/27/2017  2:00 PM                Urinary Catheter Condom Catheter 08/20/17-Criteria for Appropriate Use: Multiple Stage 3 or 4  pressure ulcers, chest to knees    Intake & Output   Date 08/26/17 1900 - 08/27/17 0659 08/27/17 0700 - 08/28/17 0659   Shift 4887-5834 24 Hour Total 4272-8686 1407-6238 24 Hour Total   I  N  T  A  K  E   I.V.  (mL/kg/hr) 100 550 350  (0.3)  350 Volume (dextrose 10% infusion 125-250 mL)   0  0      Volume (vancomycin (VANCOCIN) 1250 mg in  ml infusion) 0 250 250  250      Volume (cefepime (MAXIPIME) 2 g in 0.9% sodium chloride (MBP/ADV) 100 mL) 100 300 100  100      Volume (vancomycin (VANCOCIN) 1250 mg in  ml infusion)  0       Shift Total  (mL/kg) 100  (1) 550  (5.7) 350  (3.6)  350  (3.6)   O  U  T  P  U  T   Urine  (mL/kg/hr) 550 1625 900  (0.8)  900      Urine Output (mL) (Condom Catheter 08/20/17) 550 1625 900  900    Shift Total  (mL/kg) 550  (5.7) 1625  (16.9) 900  (9.4)  900  (9.4)   NET -450 -1075 -550  -550   Weight (kg) 96.1 96.1 96.1 96.1 96.1         Readmission Risk Assessment Tool Score High Risk            33       Total Score        3 Has Seen PCP in Last 6 Months (Yes=3, No=0)    2 . Living with Significant Other. Assisted Living. LTAC. SNF. or   Rehab    3 Patient Length of Stay (>5 days = 3)    4 IP Visits Last 12 Months (1-3=4, 4=9, >4=11)    5 Pt.  Coverage (Medicare=5 , Medicaid, or Self-Pay=4)    16 Charlson Comorbidity Score (Age + Comorbid Conditions)       Expected Length of Stay 1d 21h   Actual Length of Stay 10

## 2017-08-28 NOTE — PROGRESS NOTES
Problem: Self Care Deficits Care Plan (Adult)  Goal: *Acute Goals and Plan of Care (Insert Text)  Occupational Therapy Goals:  Initiated 8/23/2017  1. Patient will perform grooming seated with set up within 7 days. 2. Patient will perform bathing with moderate assistance within 7 days. 3. Patient will perform toileting with maximal assistance within 7 days. 4. Patient will transfer from bedside commode with moderate assist using the least restrictive device and appropriate durable medical equipment within 7 days. OCCUPATIONAL THERAPY TREATMENT  Patient: Cain Roland (35 y.o. male)  Date: 8/28/2017  Diagnosis: Abdominal pain  Elevated troponin <principal problem not specified>       Precautions: DNR, Contact, Fall, Skin (Stage 4 sacral; EF 15%; in bed x 3 mo PTA)  Chart, occupational therapy assessment, plan of care, and goals were reviewed. ASSESSMENT:  Patient received in bed willing to participate; trained B UE AROM basics, which he says he has no participation in at home; Very limited ability to participate despite willingness. VSS on 3L O2 with very limit UE AROM HEP training. Limited by task attention even with TV off; patient insistentent that he is paying close attention despite not following instructions more than 50% of the time. Demonstrates AROM B UE ~ 80% at shoulders with poor ability to sustain overhead position more than a few seconds. Expect he will need to return to 24/7 care with close attention to wound care for stage 4 sacral wound. Pleasant despite difficult medical circumstances  Progression toward goals:  [ ]          Improving appropriately and progressing toward goals  [X]          Improving slowly and progressing toward goals  [ ]          Not making progress toward goals and plan of care will be adjusted       PLAN:  Patient continues to benefit from skilled intervention to address the above impairments. Continue treatment per established plan of care.   Discharge Recommendations:  Skilled Nursing Facility  Further Equipment Recommendations for Discharge:  Pressure relief cushion, bed etc       SUBJECTIVE:   Patient stated Rene Houston will try to do these; it sounds good.       OBJECTIVE DATA SUMMARY:   Cognitive/Behavioral Status:  Neurologic State: Alert  Orientation Level: Appropriate for age  Cognition: Follows commands  Perception: Appears intact  Perseveration: No perseveration noted  Safety/Judgement: Decreased insight into deficits; Fall prevention  Functional Mobility and Transfers for ADLs:                                           ADL Intervention:     Trained health benefits of consistent HEP participation                                       Cognitive Retraining  Attention to Task: Single task  Maintains Attention For (Time): 1 minute  Safety/Judgement: Decreased insight into deficits; Fall prevention        Therapeutic activity:   See above; VSS with UE HEP training     Pain:  Pain Scale 1: Numeric (0 - 10)  Pain Intensity 1: 2  Pain Location 1: Buttocks     Pain Description 1: Aching        Activity Tolerance:    Quite limiting  Please refer to the flowsheet for vital signs taken during this treatment.   After treatment:   [ ]  Patient left in no apparent distress sitting up in chair  [X]  Patient left in no apparent distress in bed  [X]  Call bell left within reach  [X]  Nursing notified  [ ]  Caregiver present  [ ]  Bed alarm activated      COMMUNICATION/COLLABORATION:   The patients plan of care was discussed with: Registered Nurse     Lalita Mcrae OTR/L  Time Calculation: 22 mins

## 2017-08-28 NOTE — PROGRESS NOTES
Palliative Medicine Consult  [place]: [phone] ([phone])    Patient Name: Helder Monroy  YOB: 1932    Date of Initial Consult: 8/23/17  Reason for Consult:  Care decisions/pain mmt? Requesting Provider: Josh Collazo   Primary Care Physician: Brit Radford MD      SUMMARY:   Helder Monroy is a 80 y. o. with a past history of  CAD, DM II, HTN, dyslipidemia, AAA, LBBB, GERD, and MI, and PSHx of stent placement x 2, who was admitted on 8/17/2017 from transferred from [facility] ED with a diagnosis of elevated troponin. Current medical issues leading to Palliative Medicine involvement include: severe left hip pain, PNA, canker sores 2/2 drug reaction, . PER :  9/2016-5/2017 Tyl #3 #60 q. Month Dr.Kevin Golden  5/2017-6/2017 Norco 5/325mg #30 q. 2 weeks Dr. Steph Gamino  6/5/2017 Norco 5/325mg #90/23 days Vitaly Dmitry  6/26/17 Norco 5/325mg #30/8 days Steph Gamino  7/5/17 percocet 5/325mg #90/23 days, Vitaly Andres  7/16/17 oxycodone 15 IR #20/4 days, Shell Costello  7/22/17 diazepam 5mg #30/15 days Dareen Im  7/23/17 oxycodone 15 IR #30/5 days Dareen Im    24 hour opioid use:  8/27/17: 15mg Percocet  8/26/17: 20mg Percocet  8/25/17: 0.2mg IV Dilaudid, 15mg Percocet  8/24/17: 0.4mg IV Dilaudid, 25mg Percocet  8/23/17: 1 mg IV Dilaudid  8/22/17: 1 mg IV Dilaudid    Psychosocial: lives at home with wife, daughter and DAGO; daughter and DAGO were caring for pt, however, DAGO had to have a hernia repaired so pt was moved to [facility]; DAGO is still recovering, thus, not able to assist with care. PALLIATIVE DIAGNOSES:   1. Chest pain: HCAP  2. Abdominal pain  3. Nausea and vomiting  4. Left hip pain x 3 months (chronic impacted left femoral head fracture): 8/21 hip aspiration  5. Sacral decubitus ulcer: stage 3  6. Diabetic polyneuropathy   7. Obesity   8. Severe protein calorie malnutrition   9. canker sores, MRSA pos  10.  Weight loss (240 lbs on 7/19/17)  (now 227 lbs)  Not eating with canker sores x 2 weeks  11. Opioid induced constipation  12. Hypotension   13. Gait impairment     PLAN:   1. PAIN: using less medication, however, pt lying in bed 24/7. Counseled pt that if he does not get out of bed, he will become so deconditioned that he will never be able to get out of bed, so if his goal is to resume some independence, he has to get moving. Pt verbalized understanding. 2. Schedule Percocet 5/325mg q. 6 hours. Will increase frequency to q. 4 hours if necessary. 3. Resume Dilaudid to 0.2mg IV q. 4 hours prn breakthrough pain. Use prior to dressing changes, PT/OT. I have reviewed all of the notes, there is NO indication that his low BP is related to the opioids, and he should have something available to him that will work quickly, so he can get a dose prior to activity if needed. 4. Avoid long-acting opioids at this time. 5. Mobilize pt with PT/OT daily. 2. OPIOID INDUCED CONSTIPATION:  NO BM THIS ADMISSION (7 DAYS); pt was given Mag Citrate 4 days ago, soap suds enemas yesterday, with NO BM results. Pt states he cannot remember when his last BM was, RN reports no BM with enema yesterday. Pt denies feeling full or constipated. Discussed with  whether or not to continue with bowel cleansing vs KUB. Appetite has been very poor. 1. Dr. Ursula Moore ordered mag citrate. 2. Continue daily Miralax and Pericolace bid. 3. ASYMPTOMATIC HYPOTENSION: 2/2 CAD; pt/family declined cardiac catheterization or other invasive procedures. Pt denies symptoms. 4. Initial consult note routed to primary continuity provider  5. Communicated plan of care with: Palliative IDT, RN     GOALS OF CARE / TREATMENT PREFERENCES:   [====Goals of Care====]  GOALS OF CARE:  Patient / health care proxy stated goals:     1. Optimize pain control with minimal sedation  2. Mobilize pt. 3. Rehab upon discharge. 4. Consider Hospice if pt does not improve in next few weeks.       TREATMENT PREFERENCES:   Code Status: DNR    Advance Care Planning:  Advance Care Planning 8/24/2017   Patient's Healthcare Decision Maker is: Named in scanned ACP document   Primary Decision Maker Name Sherine White   Primary Decision Maker Phone Number [phone]   Primary Decision Maker Relationship to Patient Spouse   Secondary Decision Maker Name Darius Rubio   Secondary Decision Maker Phone Number [phone]   Secondary Decision Maker Relationship to Patient Adult child   Confirm Advance Directive Yes, on file       Other:    The palliative care team has discussed with patient / health care proxy about goals of care / treatment preferences for patient.  [====Goals of Care====]         HISTORY:     History obtained from: chart    CHIEF COMPLAINT: left hip pain    HPI/SUBJECTIVE:    The patient is:   [x] Verbal and participatory  [] Non-participatory due to:     BIBA to [facility] ED with c/o intermittent dull mild diffuse abd pain x 2 weeks, intermittent CP for past few weeks that got worse on day of admission, N/V that started day of admission. W/u at [facility] revealed a stable 5.3x5 cm infrarenal AAA, distended gallbladder, mild pelvic free fluid; labs significant for WBC 13.6, AST of 104; rest of LFT's WNL; troponin was 0.32; a central line was placed. Pt reports AAA was diagnosed five years ago and is inoperable. he has history of MI and stent placement x2 and is followed by Dr. Juan Mcclendon, cardiologist. Pt denies taking blood thinners, takes a daily baby ASA. Pt notes he was diagnosed with PNA four days ago, which is being treated with Levaquin. 8/23:   pt asleep, easily awakened, states his pain is currently 8/10, which he states is tolerable; he received 0.5mg IV dilaudid at 0500 and 11am.  Yesterday he received two doses of 0.5mg dilaudid, also had fentanyl patch 25 mcg/hr removed. 8/24:  Eating lunch; only ate 1 cup ice cream.  Not hungry, does not remember when last BM was. Denies discomfort at this time.      8/28: no BM yet, appetite poor, does not feel bloated, no abd pain. No pain at all unless weight bearing or movement of hip. Clinical Pain Assessment (nonverbal scale for severity on nonverbal patients):   [++++ Clinical Pain Assessment++++]  [++++Pain Severity++++]: Pain: 0/10  [++++Pain Character++++]: sharp  [++++Pain Duration++++]:   \"a long time\"  [++++Pain Effect++++]:   Unable to mobilize  [++++Pain Factors++++]:  movement  [++++Pain Frequency++++]: with movement  [++++Pain Location++++]: left hip  [++++ Clinical Pain Assessment++++]  Duration: for how long has pt been experiencing pain (e.g., 2 days, 1 month, years)  Frequency: how often pain is an issue (e.g., several times per day, once every few days, constant)     FUNCTIONAL ASSESSMENT:     Palliative Performance Scale (PPS):  PPS: 20       PSYCHOSOCIAL/SPIRITUAL SCREENING:     Advance Care Planning:  Advance Care Planning 8/24/2017   Patient's Healthcare Decision Maker is: Named in scanned ACP document   Primary Decision Maker Name Mariann Romero   Primary Decision Maker Phone Number [phone]   Primary Decision Maker Relationship to Patient Spouse   Secondary Decision Maker Name Edwin Lopez   Secondary Decision Maker Phone Number [phone]   Secondary Decision Maker Relationship to Patient Adult child   Confirm Advance Directive Yes, on file        Any spiritual / Zoroastrianism concerns:  [] Yes /  [x] No    Caregiver Burnout:  [] Yes /  [] No /  [x] No Caregiver Present      Anticipatory grief assessment:   [x] Normal  / [] Maladaptive       ESAS Anxiety: Anxiety: 0    ESAS Depression: Depression: 0        REVIEW OF SYSTEMS:     Positive and pertinent negative findings in ROS are noted above in HPI. The following systems were [x] reviewed / [] unable to be reviewed as noted in HPI  Other findings are noted below.   Systems: constitutional, ears/nose/mouth/throat, respiratory, gastrointestinal, genitourinary, musculoskeletal, integumentary, neurologic, psychiatric, endocrine. Positive findings noted below. Modified ESAS Completed by: provider   Fatigue: 6 Drowsiness: 0   Depression: 0 Pain: 0   Anxiety: 0 Nausea: 0   Anorexia: 7 Dyspnea: 0     Constipation: Yes              PHYSICAL EXAM:     From RN flowsheet:  Wt Readings from Last 3 Encounters:   08/28/17 212 lb 12.6 oz (96.5 kg)   08/17/17 217 lb 13 oz (98.8 kg)   08/13/17 220 lb 0.3 oz (99.8 kg)     Blood pressure 91/47, pulse 92, temperature 97.4 °F (36.3 °C), resp. rate 18, height 5' 10\" (1.778 m), weight 212 lb 12.6 oz (96.5 kg), SpO2 96 %.     Pain Scale 1: Numeric (0 - 10)  Pain Intensity 1: 6  Pain Onset 1: acute  Pain Location 1: Back  Pain Orientation 1: Lower  Pain Description 1: Aching  Pain Intervention(s) 1: Medication (see MAR)  Last bowel movement, if known: PT DOES NOT REMEMBER, NO BM DOCUMENTED THIS ADMISSION    Constitutional: elderly, obese, awake, alert, oriented to self, situation, place  Eyes: pupils equal, anicteric  ENMT: no nasal discharge, moist mucous membranes  Cardiovascular: regular rhythm, distal pulses intact  Respiratory: breathing not labored, symmetric  Gastrointestinal: soft non-tender, +bowel sounds  Musculoskeletal: no deformity, pain with left hip passive ROM  Skin: warm, dry, stage 3 decubitus ulcer  Neurologic: following commands, moving all extremities  Psychiatric: full affect, no hallucinations         HISTORY:     Active Problems:    Coronary artery disease involving native coronary artery of native heart without angina pectoris (7/14/2017)      Essential hypertension (7/14/2017)      Abdominal pain (8/17/2017)      Elevated troponin (8/17/2017)      CAP (community acquired pneumonia) (8/19/2017)      Therapeutic opioid-induced constipation (OIC) (8/23/2017)      Chronic left hip pain (8/23/2017)      Weight loss (8/23/2017)      Abnormality of gait due to impairment of balance (8/23/2017)      Anorexia (8/24/2017)      Past Medical History:   Diagnosis Date    AAA (abdominal aortic aneurysm) (HCC)     Asthma     CAD (coronary artery disease)     Mitzy MPI 7/14 LVEF 45, chronic inf-lat ischemia; Echo 7/14 LVEF 45, mod LVH, mild LAE, mild AI/MR    Diabetic polyneuropathy (HCC)     DJD (degenerative joint disease)     DM II (diabetes mellitus, type II), controlled ([facility]     Dyslipidemia     GERD (gastroesophageal reflux disease)     History of left bundle branch block (LBBB)     HTN (hypertension)     Myocardial infarction ([facility]     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    Pneumonia     Prostate CA ([facility] 2012    s/p XRT, hormonal      Past Surgical History:   Procedure Laterality Date    HX APPENDECTOMY      HX HEMORRHOIDECTOMY      HX PTCA      x 2 stents, vessel unknown, last 8 yrs ago      Family History   Problem Relation Age of Onset    Tuberculosis Mother     Asthma Father     Coronary Artery Disease Sister     Asthma Sister       History reviewed, no pertinent family history.   Social History   Substance Use Topics    Smoking status: Former Smoker     Types: Cigarettes     Quit date: 7/14/1997    Smokeless tobacco: Never Used    Alcohol use No     Allergies   Allergen Reactions    Contrast Agent [Iodine] Hives    Levaquin [Levofloxacin] Unknown (comments)    Sulfa (Sulfonamide Antibiotics) Unknown (comments)      Current Facility-Administered Medications   Medication Dose Route Frequency    magnesium oxide (MAG-OX) tablet 400 mg  400 mg Oral BID    furosemide (LASIX) tablet 20 mg  20 mg Oral DAILY    HYDROmorphone (PF) (DILAUDID) injection 0.2 mg  0.2 mg IntraVENous Q6H PRN    oxyCODONE-acetaminophen (PERCOCET) 5-325 mg per tablet 1 Tab  1 Tab Oral Q6H PRN    gabapentin (NEURONTIN) capsule 600 mg  600 mg Oral TID    chlorhexidine (PERIDEX) 0.12 % mouthwash 15 mL  15 mL Oral Q12H    albuterol-ipratropium (DUO-NEB) 2.5 MG-0.5 MG/3 ML  3 mL Nebulization Q6H PRN    pantoprazole (PROTONIX) tablet 40 mg  40 mg Oral ACB    midodrine (PROAMITINE) tablet 5 mg  5 mg Oral TID WITH MEALS    potassium chloride SR (KLOR-CON 10) tablet 20 mEq  20 mEq Oral DAILY    acetaminophen (TYLENOL) tablet 650 mg  650 mg Oral Q4H PRN    labetalol (NORMODYNE;TRANDATE) injection 10 mg  10 mg IntraVENous Q6H PRN    lactobac ac& pc-s.therm-b.anim (DULCE Q/RISAQUAD)  1 Cap Oral DAILY    ranolazine ER (RANEXA) tablet 500 mg  500 mg Oral BID    balsam peru-castor oil (VENELEX)  mg/gram ointment   Topical DAILY    aspirin delayed-release tablet 81 mg  81 mg Oral DAILY    atorvastatin (LIPITOR) tablet 10 mg  10 mg Oral DAILY    DULoxetine (CYMBALTA) capsule 30 mg  30 mg Oral DAILY    isosorbide mononitrate ER (IMDUR) tablet 30 mg  30 mg Oral DAILY    levothyroxine (SYNTHROID) tablet 50 mcg  50 mcg Oral ACB    montelukast (SINGULAIR) tablet 10 mg  10 mg Oral DAILY    nitroglycerin (NITROSTAT) tablet 0.4 mg  0.4 mg SubLINGual PRN    tamsulosin (FLOMAX) capsule 0.8 mg  0.8 mg Oral DAILY    ondansetron (ZOFRAN) injection 4 mg  4 mg IntraVENous Q4H PRN    sodium chloride (NS) flush 5-10 mL  5-10 mL IntraVENous Q8H    sodium chloride (NS) flush 5-10 mL  5-10 mL IntraVENous PRN    naloxone (NARCAN) injection 0.4 mg  0.4 mg IntraVENous PRN    enoxaparin (LOVENOX) injection 40 mg  40 mg SubCUTAneous DAILY    insulin lispro (HUMALOG) injection   SubCUTAneous AC&HS    glucose chewable tablet 16 g  4 Tab Oral PRN    glucagon (GLUCAGEN) injection 1 mg  1 mg IntraMUSCular PRN    dextrose 10% infusion 125-250 mL  125-250 mL IntraVENous PRN    senna-docusate (PERICOLACE) 8.6-50 mg per tablet 1 Tab  1 Tab Oral BID    polyethylene glycol (MIRALAX) packet 17 g  17 g Oral DAILY          LAB AND IMAGING FINDINGS:     Lab Results   Component Value Date/Time    WBC 6.7 08/28/2017 04:30 AM    HGB 8.0 08/28/2017 04:30 AM    PLATELET 290 77/79/0498 04:30 AM     Lab Results   Component Value Date/Time    Sodium 136 08/28/2017 04:30 AM    Potassium 4.1 08/28/2017 04:30 AM    Chloride 97 08/28/2017 04:30 AM    CO2 35 08/28/2017 04:30 AM    BUN 10 08/28/2017 04:30 AM    Creatinine 0.52 08/28/2017 04:30 AM    Calcium 7.8 08/28/2017 04:30 AM    Magnesium 1.5 08/28/2017 04:30 AM      Lab Results   Component Value Date/Time    AST (SGOT) 23 08/28/2017 04:30 AM    Alk. phosphatase 84 08/28/2017 04:30 AM    Protein, total 5.5 08/28/2017 04:30 AM    Albumin 1.5 08/28/2017 04:30 AM    Globulin 4.0 08/28/2017 04:30 AM     Lab Results   Component Value Date/Time    INR 1.4 08/17/2017 02:39 PM    Prothrombin time 15.7 08/17/2017 02:39 PM    aPTT 20.4 08/17/2017 02:39 PM      No results found for: IRON, FE, TIBC, IBCT, PSAT, FERR   No results found for: PH, PCO2, PO2  No components found for: [place]   Lab Results   Component Value Date/Time    CK 23 08/17/2017 05:30 PM    CK - MB 1.8 08/17/2017 05:30 PM                Total time: 45 min  Counseling / coordination time, spent as noted above: 35 min  > 50% counseling / coordination?: yes  Prolonged service was provided for  []30 min   []75 min in face to face time in the presence of the patient, spent as noted above. Time Start:   Time End:   Note: this can only be billed with 35291 (initial) or 23062 (follow up). If multiple start / stop times, list each separately.

## 2017-08-28 NOTE — PROGRESS NOTES
Spiritual Care Assessment/Progress Notes    Loida Lewis [id number]  [id number]    9/16/1932  80 y.o.  male    Patient Telephone Number: [phone] (home)   Samaritan Affiliation: Roman Catholic   Language: English   Extended Emergency Contact Information  Primary Emergency Contact: Hafsa Cassidy  Address: [address] [place] [address], [address] [place] [address] [address] [place] [address] Phone: [phone]  Mobile Phone: [phone]  Relation: Spouse  Secondary Emergency Contact: [address] [place] [address] Phone: [phone]  Relation: Daughter   Patient Active Problem List    Diagnosis Date Noted    Anorexia 08/24/2017    Therapeutic opioid-induced constipation (OIC) 08/23/2017    Chronic left hip pain 08/23/2017    Weight loss 08/23/2017    Abnormality of gait due to impairment of balance 08/23/2017    CAP (community acquired pneumonia) 08/19/2017    Abdominal pain 08/17/2017    Elevated troponin 08/17/2017    NSTEMI, initial episode of care [facility]) 07/22/2017    Coronary artery disease involving native coronary artery of native heart without angina pectoris 07/14/2017    Essential hypertension 07/14/2017    Dyslipidemia 07/14/2017    Controlled type 2 diabetes mellitus without complication, without long-term current use of insulin ([facility] 07/14/2017    Abdominal aortic aneurysm (AAA) without rupture ([facility] 07/14/2017    Diabetic polyneuropathy associated with type 2 diabetes mellitus ([facility] 07/14/2017    Degenerative joint disease (DJD) of hip 07/14/2017    Leg pain 07/14/2017        Date: 8/28/2017       Level of Samaritan/Spiritual Activity:  [x]         Involved in jackie tradition/spiritual practice    []         Not involved in jackie tradition/spiritual practice  [x]         Spiritually oriented    []         Claims no spiritual orientation    []         seeking spiritual identity  []         Feels alienated from Worship practice/tradition  []         Feels angry about Worship practice/tradition  [x]         Spirituality/Sabianism tradition is a resource for coping at this time. []         Not able to assess due to medical condition    Services Provided Today:  []         crisis intervention    []         reading Scriptures  [x]         spiritual assessment    []         prayer  [x]         empathic listening/emotional support  []         rites and rituals (cite in comments)  []         life review     []         Sabianism support  []         theological development   []         advocacy  []         ethical dialog     []         blessing  []         bereavement support    []         support to family  []         anticipatory grief support   []         help with AMD  []         spiritual guidance    []         meditation      Spiritual Care Needs  [x]         Emotional Support  []         Spiritual/Caodaism Care  []         Loss/Adjustment  []         Advocacy/Referral                /Ethics  []         No needs expressed at               this time  []         Other: (note in               comments)  [address]  []         Follow up visits with               pt/family  []         Provide materials  []         Schedule sacraments  []         Contact Community               Clergy  [x]         Follow up as needed  []         Other: (note in               comments)     Comments: Responded to length of stay & Palliative pt. Pt was sitting up in bed with lunch tray in front of him, but very little was consumed. Provided active listening as pt shared that his wife and younger daughter (live locally) are here every day. Pt also spoke about his older daughter who lives in [place] and has 3 young children so is unable to visit as often. Pt said he was a member of the [employer] of [place], [place] and that his daughter was head of the women's group there. Pt's  is also coming to visit today. Oldest daughter came over the weekend. Pt seemed to be in pain.   With inquiry pt admitted to being in pain.  (with permission from pt) contacted RN to check on pain management. Provided assurance that RN was checking on it and that he should probably try to drink more. RN returned to give pain med and encouragement to continue drinking. Pt then asked for help lowering the bed so he could rest.  Head of the bed lowered, pt decided to rest before his other visitors arrived.  will check in occasionally for any additional needs. Sabine Dietz M.Div.   Palliative  Fellow

## 2017-08-28 NOTE — PROGRESS NOTES
Problem: Falls - Risk of  Goal: *Absence of Falls  Document Maximino Fall Risk and appropriate interventions in the flowsheet.    Outcome: Progressing Towards Goal  Fall Risk Interventions:  Mobility Interventions: Assess mobility with egress test, Bed/chair exit alarm, Patient to call before getting OOB, PT Consult for assist device competence, Utilize walker, cane, or other assitive device, OT consult for ADLs, PT Consult for mobility concerns     Mentation Interventions: Adequate sleep, hydration, pain control, Bed/chair exit alarm, Door open when patient unattended, Reorient patient, Toileting rounds, More frequent rounding, Gait belt with transfers/ambulation, Eyeglasses and hearing aids, Update white board, Room close to nurse's station, Increase mobility, Evaluate medications/consider consulting pharmacy     Medication Interventions: Bed/chair exit alarm, Patient to call before getting OOB, Teach patient to arise slowly, Evaluate medications/consider consulting pharmacy     Elimination Interventions: Bed/chair exit alarm, Call light in reach, Elevated toilet seat, Toileting schedule/hourly rounds, Toilet paper/wipes in reach, Patient to call for help with toileting needs, Urinal in reach     History of Falls Interventions: Bed/chair exit alarm, Consult care management for discharge planning, Evaluate medications/consider consulting pharmacy, Room close to nurse's station, Investigate reason for fall, Door open when patient unattended

## 2017-08-28 NOTE — PROGRESS NOTES
CM spoke to Dr. Ursula Moore who indicated that the family is interested in comfort care. CM spoke to family. The are no longer holding the LTC bed at [facility]. CM explained that family may be interested in coming back to [facility] for [address] with Hospice. CM called [facility] and had to leave a  with admissions to see if they had a preferred hospice provider. Family does not think that he could participate in therapy at [facility]. . It just takes too much out of him. Family is ready for hospice. CM will continue to monitor discharge plan.       Dago Myers, [address]   Ext [phone]

## 2017-08-28 NOTE — PROGRESS NOTES
Hospitalist Progress Note    NAME: Dov Harper   :  1932   MRN:  [id number]       Interim Hospital Summary: 80 y.o. male whom presented on 2017 with      Assessment / Plan:  HCAP  Likely aspiration pneumonia  - CXR revaled Left upper lobe atelectasis/consolidation. - continue with IV Cefepime and Vanc (completed 10 days). Pt developed severe allergic reaction after taking levofloxacin which resulted severe canker sores. - O2 Sat 96% at 2L of O2 via n/c; wean O2 as tolerate  CXR shows improved aeration, atelectasis decreasing in size  Canker sores: resolved, c/w peridex. Chest Pain  Elevated Troponin  History of CAD s/p remote PCI  Stress Test negative on 17  Hyperlipidemia'  Chronic Systolic Heart Failure: (may be end stage Cardiomyopathy)  - discussed with the family and pt yesterday in regards of code status and goals of care. Patient and the family requested continue with current care, do not wish to have any invasive procedures.  Pt and the family asked for optimal pain control. Will consult palliative team to follow. - no further chest pain since we added Ranexa   - EKG with TWI in V1-V5  - troponin went up to 0.55 from 0.32  - cardiology following; communicated with cardiology team on pt's wish. Conservative medical management. Cardiology team will follow as need basis  - Echo: Ejection fraction was estimated in the range of 15 % to 20 %  - continue ASA, BB, Ranexa, & IMDUR  - continue statin, CXR is better, BP is better, will try low dose diuretics. Hypotension: becoming hypotensive every day in the afternoon requiring IVF boluses, so far improved on Midodrine, monitor. Abdominal Pain: now resolved  Nausea/vomiting  - CT Abdomen/Pelvis: Distended gallbladder. Infrarenal abdominal aortic aneurysm measures 5.3 x 5 cm (no changes from previous images), Colonic diverticulosis. - Abdominal ultrasound:Sludge layers dependently in the nondistended gallbladder.  No wall thickening or pericholecystic fluid.    - Avoid NSAID  - IV PPI & IV antiemetics  - stop diclofenac  Chronic Impacted Left Femoral Head Fracture: ideally needs total hip replacement to control pain  - D/erika  fentanyl patch  May be contributing to hypotension and excessive sedation  - scheduled Acetaminophen  - prn Oxycodone or dilaudid, valium as need for severe muscle spasm  - Dr. Katerina Arriola spoke with Dr. Kirti Das in Radiology to get addendum added on to patient CT A/P which was preformed at [address] detailing patient's left hip pathology  - ortho following; pain management. IR for aspiration to r/o septic joint  Sacral Decub:  - consult wound care; STAGE 4 decubitus ulcer  Type 2 diabetes mellitus without complication, without long-term current use of insulin   Diabetic polyneuropathy  - hold home oral antihyperglycemics and place on SSI  - Hgb A1c 6.6  - decrease dose of  Gabapentin  Abdominal aortic aneurysm (AAA)   - stable on today's CT:Infrarenal abdominal aortic aneurysm measures 5.3 x 5 cm  Obesity  Severe Protein Calorie Malnutrition  -consult Nutrition  Hypokalemia: replace and monitor. Hypomagnesemia: replace and monitor. Constipation: already on laxatives, no BM, give Enemas  Code Status: DNR   DVT Prophylaxis: Lovenox  GI Prophylaxis: PPI as above   Baseline: Bed bound for last 3 months per patient report  Recommended Disposition:SNF     Apparently family not ready for Hospice yet, monitor. But wife can not care for him at home may need SNF on Comfort bed or Hospice, will meet with wife today     Subjective:     Chief Complaint / Reason for Physician Visit  \"I feel much better\". Discussed with RN events overnight.      Review of Systems:  Symptom Y/N Comments  Symptom Y/N Comments   Fever/Chills    Chest Pain     Poor Appetite    Edema n    Cough    Abdominal Pain     Sputum    Joint Pain y    SOB/LEWIS    Pruritis/Rash     Nausea/vomit    Tolerating PT/OT     Diarrhea    Tolerating Diet y    Constipation    Other Could NOT obtain due to:      Objective:     VITALS:   Last 24hrs VS reviewed since prior progress note. Most recent are:  Patient Vitals for the past 24 hrs:   Temp Pulse Resp BP SpO2   08/28/17 0715 97.6 °F (36.4 °C) 81 18 (!) 133/97 97 %   08/28/17 0411 97.4 °F (36.3 °C) 79 18 134/63 97 %   08/28/17 0001 98 °F (36.7 °C) 72 18 128/59 99 %   08/27/17 2000 - 76 - 121/58 99 %   08/27/17 1900 98.1 °F (36.7 °C) 80 18 100/75 96 %   08/27/17 1705 98.2 °F (36.8 °C) 74 18 97/67 96 %   08/27/17 1523 98.1 °F (36.7 °C) 77 18 112/48 99 %   08/27/17 1432 - - - - 98 %       Intake/Output Summary (Last 24 hours) at 08/28/17 1231  Last data filed at 08/28/17 1637   Gross per 24 hour   Intake                0 ml   Output              900 ml   Net             -900 ml        PHYSICAL EXAM:  General: WD, WN.awake, no acute distress    EENT:  EOMI. Anicteric sclerae. MMM  Resp:  Coarse BS, rhonchi  CV:  Regular  rhythm,  trace edema  GI:  Soft, obese, Non tender.  +Bowel sounds  Neurologic:  Awake, oriented x2. normal speech, GCS [id number]  Psych:   Fair insight. Not anxious nor agitated  Skin:  No rashes. No jaundice    Reviewed most current lab test results and cultures  YES  Reviewed most current radiology test results   YES  Review and summation of old records today    NO  Reviewed patient's current orders and MAR    YES  PMH/SH reviewed - no change compared to H&P  ________________________________________________________________________  Care Plan discussed with:    Comments   Patient y    Family  y wife   RN y    Care Manager y    Consultant  y Ortho                    y Multidiciplinary team rounds were held today with , nursing, pharmacist and clinical coordinator. Patient's plan of care was discussed; medications were reviewed and discharge planning was addressed.      ________________________________________________________________________  Total NON critical care TIME:  20   Minutes    Total CRITICAL CARE TIME Spent: Minutes non procedure based      Comments   >50% of visit spent in counseling and coordination of care y    ________________________________________________________________________  Marimar Rosales MD     Procedures: see electronic medical records for all procedures/Xrays and details which were not copied into this note but were reviewed prior to creation of Plan. LABS:  I reviewed today's most current labs and imaging studies.   Pertinent labs include:  Recent Labs      08/28/17   0430  08/27/17   0255  08/26/17   0236   WBC  6.7  7.4  10.7   HGB  8.0*  7.5*  8.2*   HCT  25.0*  23.6*  25.5*   PLT  255  230  257     Recent Labs      08/28/17   0430  08/27/17 0255  08/26/17   0236   NA  136  136  134*   K  4.1  3.8  3.7   CL  97  100  99   CO2  35*  33*  33*   GLU  110*  122*  114*   BUN  10  10  10   CREA  0.52*  0.55*  0.58*   CA  7.8*  7.7*  7.4*   MG  1.5*  1.6  1.6   ALB  1.5*  1.4*  1.5*   TBILI  0.5  0.4  0.4   SGOT  23  12*  14*   ALT  10*  8*  8*       Signed: )Marimar Rosales MD

## 2017-08-29 ENCOUNTER — APPOINTMENT (OUTPATIENT)
Dept: GENERAL RADIOLOGY | Age: 82
DRG: 177 | End: 2017-08-29
Attending: NURSE PRACTITIONER
Payer: MEDICARE

## 2017-08-29 LAB
ALBUMIN SERPL-MCNC: 1.5 G/DL (ref 3.5–5)
ALBUMIN/GLOB SERPL: 0.4 {RATIO} (ref 1.1–2.2)
ALP SERPL-CCNC: 83 U/L (ref 45–117)
ALT SERPL-CCNC: 6 U/L (ref 12–78)
ANION GAP SERPL CALC-SCNC: 3 MMOL/L (ref 5–15)
AST SERPL-CCNC: 14 U/L (ref 15–37)
BASOPHILS # BLD: 0 K/UL (ref 0–0.1)
BASOPHILS NFR BLD: 0 % (ref 0–1)
BILIRUB SERPL-MCNC: 0.4 MG/DL (ref 0.2–1)
BUN SERPL-MCNC: 9 MG/DL (ref 6–20)
BUN/CREAT SERPL: 16 (ref 12–20)
CALCIUM SERPL-MCNC: 8 MG/DL (ref 8.5–10.1)
CHLORIDE SERPL-SCNC: 95 MMOL/L (ref 97–108)
CO2 SERPL-SCNC: 36 MMOL/L (ref 21–32)
CREAT SERPL-MCNC: 0.57 MG/DL (ref 0.7–1.3)
EOSINOPHIL # BLD: 0.1 K/UL (ref 0–0.4)
EOSINOPHIL NFR BLD: 3 % (ref 0–7)
ERYTHROCYTE [DISTWIDTH] IN BLOOD BY AUTOMATED COUNT: 15.2 % (ref 11.5–14.5)
GLOBULIN SER CALC-MCNC: 3.9 G/DL (ref 2–4)
GLUCOSE BLD STRIP.AUTO-MCNC: 114 MG/DL (ref 65–100)
GLUCOSE BLD STRIP.AUTO-MCNC: 120 MG/DL (ref 65–100)
GLUCOSE BLD STRIP.AUTO-MCNC: 131 MG/DL (ref 65–100)
GLUCOSE BLD STRIP.AUTO-MCNC: 140 MG/DL (ref 65–100)
GLUCOSE SERPL-MCNC: 125 MG/DL (ref 65–100)
HCT VFR BLD AUTO: 24.2 % (ref 36.6–50.3)
HGB BLD-MCNC: 7.6 G/DL (ref 12.1–17)
LYMPHOCYTES # BLD: 1.1 K/UL (ref 0.8–3.5)
LYMPHOCYTES NFR BLD: 21 % (ref 12–49)
MAGNESIUM SERPL-MCNC: 1.7 MG/DL (ref 1.6–2.4)
MCH RBC QN AUTO: 31.5 PG (ref 26–34)
MCHC RBC AUTO-ENTMCNC: 31.4 G/DL (ref 30–36.5)
MCV RBC AUTO: 100.4 FL (ref 80–99)
MONOCYTES # BLD: 0.7 K/UL (ref 0–1)
MONOCYTES NFR BLD: 15 % (ref 5–13)
NEUTS SEG # BLD: 3.1 K/UL (ref 1.8–8)
NEUTS SEG NFR BLD: 61 % (ref 32–75)
PLATELET # BLD AUTO: 231 K/UL (ref 150–400)
POTASSIUM SERPL-SCNC: 3.9 MMOL/L (ref 3.5–5.1)
PROT SERPL-MCNC: 5.4 G/DL (ref 6.4–8.2)
RBC # BLD AUTO: 2.41 M/UL (ref 4.1–5.7)
SERVICE CMNT-IMP: ABNORMAL
SODIUM SERPL-SCNC: 134 MMOL/L (ref 136–145)
WBC # BLD AUTO: 5.1 K/UL (ref 4.1–11.1)

## 2017-08-29 PROCEDURE — 74011250637 HC RX REV CODE- 250/637: Performed by: NURSE PRACTITIONER

## 2017-08-29 PROCEDURE — 74011250637 HC RX REV CODE- 250/637: Performed by: INTERNAL MEDICINE

## 2017-08-29 PROCEDURE — 83735 ASSAY OF MAGNESIUM: CPT | Performed by: INTERNAL MEDICINE

## 2017-08-29 PROCEDURE — 74011250636 HC RX REV CODE- 250/636: Performed by: INTERNAL MEDICINE

## 2017-08-29 PROCEDURE — 74011636637 HC RX REV CODE- 636/637: Performed by: INTERNAL MEDICINE

## 2017-08-29 PROCEDURE — 82962 GLUCOSE BLOOD TEST: CPT

## 2017-08-29 PROCEDURE — 80053 COMPREHEN METABOLIC PANEL: CPT | Performed by: INTERNAL MEDICINE

## 2017-08-29 PROCEDURE — 74000 XR ABD (KUB): CPT

## 2017-08-29 PROCEDURE — 85025 COMPLETE CBC W/AUTO DIFF WBC: CPT | Performed by: INTERNAL MEDICINE

## 2017-08-29 PROCEDURE — 65660000000 HC RM CCU STEPDOWN

## 2017-08-29 PROCEDURE — 36415 COLL VENOUS BLD VENIPUNCTURE: CPT | Performed by: INTERNAL MEDICINE

## 2017-08-29 RX ORDER — OXYCODONE AND ACETAMINOPHEN 5; 325 MG/1; MG/1
1 TABLET ORAL EVERY 6 HOURS
Status: DISCONTINUED | OUTPATIENT
Start: 2017-08-29 | End: 2017-08-30 | Stop reason: HOSPADM

## 2017-08-29 RX ADMIN — DOCUSATE SODIUM AND SENNOSIDES 1 TABLET: 8.6; 5 TABLET, FILM COATED ORAL at 19:11

## 2017-08-29 RX ADMIN — POTASSIUM CHLORIDE 20 MEQ: 750 TABLET, FILM COATED, EXTENDED RELEASE ORAL at 08:57

## 2017-08-29 RX ADMIN — PANTOPRAZOLE SODIUM 40 MG: 40 TABLET, DELAYED RELEASE ORAL at 07:40

## 2017-08-29 RX ADMIN — OXYCODONE HYDROCHLORIDE AND ACETAMINOPHEN 1 TABLET: 5; 325 TABLET ORAL at 16:30

## 2017-08-29 RX ADMIN — MIDODRINE HYDROCHLORIDE 5 MG: 5 TABLET ORAL at 17:15

## 2017-08-29 RX ADMIN — ASPIRIN 81 MG: 81 TABLET, COATED ORAL at 08:59

## 2017-08-29 RX ADMIN — LEVOTHYROXINE SODIUM 50 MCG: 50 TABLET ORAL at 07:40

## 2017-08-29 RX ADMIN — Medication 400 MG: at 08:59

## 2017-08-29 RX ADMIN — ENOXAPARIN SODIUM 40 MG: 40 INJECTION SUBCUTANEOUS at 08:56

## 2017-08-29 RX ADMIN — MIDODRINE HYDROCHLORIDE 5 MG: 5 TABLET ORAL at 12:01

## 2017-08-29 RX ADMIN — Medication 10 ML: at 14:00

## 2017-08-29 RX ADMIN — MIDODRINE HYDROCHLORIDE 5 MG: 5 TABLET ORAL at 08:58

## 2017-08-29 RX ADMIN — TAMSULOSIN HYDROCHLORIDE 0.8 MG: 0.4 CAPSULE ORAL at 08:59

## 2017-08-29 RX ADMIN — OXYCODONE HYDROCHLORIDE AND ACETAMINOPHEN 1 TABLET: 5; 325 TABLET ORAL at 08:57

## 2017-08-29 RX ADMIN — ATORVASTATIN CALCIUM 10 MG: 10 TABLET, FILM COATED ORAL at 08:58

## 2017-08-29 RX ADMIN — Medication 1 CAPSULE: at 08:58

## 2017-08-29 RX ADMIN — RANOLAZINE 500 MG: 500 TABLET, FILM COATED, EXTENDED RELEASE ORAL at 19:11

## 2017-08-29 RX ADMIN — MONTELUKAST SODIUM 10 MG: 10 TABLET, FILM COATED ORAL at 08:58

## 2017-08-29 RX ADMIN — GABAPENTIN 600 MG: 300 CAPSULE ORAL at 08:58

## 2017-08-29 RX ADMIN — DULOXETINE HYDROCHLORIDE 30 MG: 30 CAPSULE, DELAYED RELEASE ORAL at 08:59

## 2017-08-29 RX ADMIN — INSULIN LISPRO 2 UNITS: 100 INJECTION, SOLUTION INTRAVENOUS; SUBCUTANEOUS at 12:00

## 2017-08-29 RX ADMIN — Medication 10 ML: at 21:55

## 2017-08-29 RX ADMIN — GABAPENTIN 600 MG: 300 CAPSULE ORAL at 21:54

## 2017-08-29 RX ADMIN — DOCUSATE SODIUM AND SENNOSIDES 1 TABLET: 8.6; 5 TABLET, FILM COATED ORAL at 08:57

## 2017-08-29 RX ADMIN — CHLORHEXIDINE GLUCONATE 15 ML: 1.2 RINSE ORAL at 21:54

## 2017-08-29 RX ADMIN — POLYETHYLENE GLYCOL 3350 17 G: 17 POWDER, FOR SOLUTION ORAL at 08:56

## 2017-08-29 RX ADMIN — RANOLAZINE 500 MG: 500 TABLET, FILM COATED, EXTENDED RELEASE ORAL at 08:58

## 2017-08-29 RX ADMIN — FUROSEMIDE 20 MG: 20 TABLET ORAL at 08:58

## 2017-08-29 RX ADMIN — GABAPENTIN 600 MG: 300 CAPSULE ORAL at 16:30

## 2017-08-29 RX ADMIN — OXYCODONE HYDROCHLORIDE AND ACETAMINOPHEN 1 TABLET: 5; 325 TABLET ORAL at 23:42

## 2017-08-29 RX ADMIN — Medication 10 ML: at 03:18

## 2017-08-29 RX ADMIN — CHLORHEXIDINE GLUCONATE 15 ML: 1.2 RINSE ORAL at 09:00

## 2017-08-29 RX ADMIN — Medication 400 MG: at 19:11

## 2017-08-29 RX ADMIN — CASTOR OIL AND BALSAM, PERU: 788; 87 OINTMENT TOPICAL at 09:00

## 2017-08-29 RX ADMIN — ISOSORBIDE MONONITRATE 30 MG: 30 TABLET, EXTENDED RELEASE ORAL at 08:58

## 2017-08-29 NOTE — PROGRESS NOTES
Hospitalist Progress Note    NAME: Karissa Monteiro   :  1932   MRN:  [id number]       Interim Hospital Summary: 80 y.o. male whom presented on 2017 with      Assessment / Plan:  HCAP  - CXR  revaled Left upper lobe atelectasis/consolidation. Repeat CXR on  revealed Persistent dense airspace disease in left upper lobe consistent with  pneumonia. Pulmonary infarct could have a similar appearance  - continue with IV Cefepime and Vanc. Pt developed severe allergic reaction after taking levofloxacin which resulted severe canker sores. Patient completed total of 10 days of ABT on 2017    - O2 Sat 96% at 3 L of O2 via n/c; wean O2 as tolerate      Canker sores  - magic mouth wash PRN, resolved      Chest Pain  Elevated Troponin  History of CAD s/p remote PCI  Stress Test negative on 17  Hyperlipidemia  - Patient and the family requested continue with current care, do not wish to have any invasive procedures.    - EKG with TWI in V1-V5  - elevated troponin but no intervention- cardiology following; communicated with cardiology team on pt's wish. Conservative medical management. Cardiology team will follow as need basis  - Echo: Ejection fraction was estimated in the range of 15 % to 20 %  - continue ASA, BB, Ranexa, & IMDUR  - continue statin      Abdominal Pain: now resolved  Nausea/vomiting (resolved)  - CT Abdomen/Pelvis: Distended gallbladder. Infrarenal abdominal aortic aneurysm measures 5.3 x 5 cm (no changes from previous images), Colonic diverticulosis. - Abdominal ultrasound:Sludge layers dependently in the nondistended gallbladder. No wall thickening or pericholecystic fluid.    - Avoid NSAID  - continue with PPI   - diclofenac has been d/c'd  - ? Concerns of OIC. Pt received SSE and  Mag citrate with no BM.   Ordered kub to r/o constipation.      Chronic Impacted Left Femoral Head Fracture: ideally needs total hip replacement to control pain  -  Pain management done by palliative team.  - scheduled Acetaminophen  - Dr. Tea Field spoke with Dr. Duglas Steele in Radiology to get addendum added on to patient CT A/P which was preformed at [facility] detailing patient's left hip pathology  - ortho following; pain management. Joint aspiration has no growth      Sacral Decub:  - consult wound care; STAGE 4 decubitus ulcer      Type 2 diabetes mellitus without complication, without long-term current use of insulin   Diabetic polyneuropathy  - hold home oral antihyperglycemics and place on SSI  - Hgb A1c 6.6  - continue home Gabapentin      Abdominal aortic aneurysm (AAA)   - stable on today's CT:Infrarenal abdominal aortic aneurysm measures 5.3 x 5 cm      Obesity  Severe Protein Calorie Malnutrition  -consult Nutrition      Code Status: DNR      DVT Prophylaxis: Lovenox  GI Prophylaxis: PPI as above      Baseline: Bed bound for last 3 months per patient report      Recommended Disposition:SNF vs hospice      May transfer this patient to telemetry unit from step down       Subjective:     Chief Complaint / Reason for Physician Visit  \"I am feeling ok\". Discussed with RN events overnight. Review of Systems:  Symptom Y/N Comments  Symptom Y/N Comments   Fever/Chills n   Chest Pain n    Poor Appetite    Edema     Cough n   Abdominal Pain     Sputum    Joint Pain     SOB/LEWIS n   Pruritis/Rash     Nausea/vomit n   Tolerating PT/OT     Diarrhea    Tolerating Diet     Constipation    Other       Could NOT obtain due to:      Objective:     VITALS:   Last 24hrs VS reviewed since prior progress note.  Most recent are:  Patient Vitals for the past 24 hrs:   Temp Pulse Resp BP SpO2   08/29/17 0733 97.5 °F (36.4 °C) 80 18 132/61 97 %   08/29/17 0304 97.4 °F (36.3 °C) 79 18 111/55 94 %   08/28/17 2313 97.6 °F (36.4 °C) 81 18 103/52 99 %   08/28/17 1920 97.8 °F (36.6 °C) 87 18 93/43 97 %   08/28/17 1525 97.4 °F (36.3 °C) 90 18 92/49 97 %   08/28/17 1257 - 92 - 91/47 96 %   08/28/17 1248 97.4 °F (36.3 °C) 76 - 91/47 93 %       Intake/Output Summary (Last 24 hours) at 08/29/17 1053  Last data filed at 08/29/17 0733   Gross per 24 hour   Intake                0 ml   Output             1600 ml   Net            -1600 ml        PHYSICAL EXAM:  General: Ill appearing. Pale, Alert, cooperative, no acute distress    EENT:  EOMI. Anicteric sclerae. MMM  Resp:  CTA in apex with decreased breath sounds at bases no wheezing or rales. No accessory muscle use  CV:  Regular  rhythm,  trace of pitting edema  GI:  Soft, Non distended, Non tender.  +Bowel sounds  Neurologic:  Alert and oriented X 3, normal speech,   Psych:   Good insight. Not anxious nor agitated  Skin:  Stage 4 decubitus ulcer. No jaundice    Reviewed most current lab test results and cultures  YES  Reviewed most current radiology test results   YES  Review and summation of old records today    NO  Reviewed patient's current orders and MAR    YES  PMH/ reviewed - no change compared to H&P  ________________________________________________________________________  Care Plan discussed with:    Comments   Patient y    Family      RN y    Care Manager y    Consultant                        Multidiciplinary team rounds were held today with , nursing, pharmacist and clinical coordinator. Patient's plan of care was discussed; medications were reviewed and discharge planning was addressed. ________________________________________________________________________  Total NON critical care TIME:  30   Minutes    Total CRITICAL CARE TIME Spent:   Minutes non procedure based      Comments   >50% of visit spent in counseling and coordination of care     ________________________________________________________________________  Niesha Lebron NP     Procedures: see electronic medical records for all procedures/Xrays and details which were not copied into this note but were reviewed prior to creation of Plan.       LABS:  I reviewed today's most current labs and imaging studies.   Pertinent labs include:  Recent Labs      08/29/17   0324  08/28/17   0430  08/27/17   0255   WBC  5.1  6.7  7.4   HGB  7.6*  8.0*  7.5*   HCT  24.2*  25.0*  23.6*   PLT  231  255  230     Recent Labs      08/29/17   0324  08/28/17   0430  08/27/17   0255   NA  134*  136  136   K  3.9  4.1  3.8   CL  95*  97  100   CO2  36*  35*  33*   GLU  125*  110*  122*   BUN  9  10  10   CREA  0.57*  0.52*  0.55*   CA  8.0*  7.8*  7.7*   MG  1.7  1.5*  1.6   ALB  1.5*  1.5*  1.4*   TBILI  0.4  0.5  0.4   SGOT  14*  23  12*   ALT  6*  10*  8*       Signed: )Torres Saavedra, KO

## 2017-08-29 NOTE — PROGRESS NOTES
Nutrition Assessment:    INTERVENTIONS/RECOMMENDATIONS:   Meals/Snacks: General/healthful diet: Continue current diet. Encourage PO intake. Supplements: Commercial supplement: Continue Ensure Pudding. ASSESSMENT:   Chart reviewed, medically noted for CAD, HTN, and wt loss. Visited pt at bedside, pt was drowsy and asleep upon arrival. Pt reports fair appetite, noted untouched lunch tray and unopened oral nutritional supplements at bedside. Will cancel Glucerna but continue Ensure Pudding to supplement PO intake. Noted for transfer to LTC/hospice. Diet Order: Other (comment) (Dysphagia adv. soft)  % Eaten:  No data found. Pertinent Medications: [x] Reviewed []Other:  Pertinent Labs: [x]Reviewed  []Other: Glu 140-131-135  Food Allergies: [x]None []Other:      Last BM: 8/28   [x]Active     []Hyperactive  []Hypoactive       [] Absent  BS  Skin:    [x] Intact   [] Incision  [] Breakdown   []Edema   []Other:    Anthropometrics: Height: 5' 10\" (177.8 cm) Weight: 96.5 kg (212 lb 11.9 oz)    IBW (%IBW):   ( ) UBW (%UBW):   (  %)    BMI: Body mass index is 30.53 kg/(m^2). This BMI is indicative of:  []Underweight   []Normal   []Overweight   [x] Obesity   [] Extreme Obesity (BMI>40)  Last Weight Metrics:  Weight Loss Metrics 8/29/2017 8/17/2017 8/17/2017 8/17/2017 8/13/2017 7/19/2017 7/16/2017   Today's Wt 212 lb 11.9 oz - 217 lb 13 oz - 220 lb 0.3 oz 240 lb 240 lb   BMI - 30.53 kg/m2 - 30.38 kg/m2 30.69 kg/m2 33.47 kg/m2 33.47 kg/m2       Estimated Nutrition Needs (Based on): 1987 Kcals/day ([id number]. 2(AF)) , 96.5 g (1 g/kg bw) Protein  Carbohydrate:  At Least 130 g/day  Fluids: 2000 mL/day    NUTRITION DIAGNOSES:   Problem:  Inadequate energy intake      Etiology: related to current medical condition     Signs/Symptoms: as evidenced by pt consuming <50% of meals and ONS       NUTRITION INTERVENTIONS:  Meals/Snacks: General/healthful diet   Supplements: Commercial supplement              GOAL:   Consume >50% of meals plus ONS over next 3-5 days    NUTRITION MONITORING AND EVALUATION   Behavioral-Environmental Outcomes: Behavior  Food/Nutrient Intake Outcomes: Total energy intake  Physical Signs/Symptoms Outcomes: Weight/weight change, Glucose profile    Previous Goal Met:   [] Met              [x] Progressing Towards Goal              [] Not Progressing Towards Goal   Previous Recommendations:   [x] Implemented          [] Not Implemented          [] Not Applicable    LEARNING NEEDS (Diet, Food/Nutrient-Drug Interaction):    [x] None Identified   [] Identified and Education Provided/Documented   [] Identified and Pt declined/was not appropriate     Cultural, Taoism, OR Ethnic Dietary Needs:    [x] None Identified   [] Identified and Addressed     [x] Interdisciplinary Care Plan Reviewed/Documented    [x] Discharge Planning: Continue current diet as tolerated.    [] Participated in Interdisciplinary Rounds    NUTRITION RISK:    [] High              [x] Moderate           []  Low  []  Minimal/Uncompromised      Sabino Riddles V  Dietetic Intern

## 2017-08-29 NOTE — PROGRESS NOTES
Palliative Medicine Consult  [place]: [phone] ([phone])    Patient Name: Dlai Coreas  YOB: 1932    Date of Initial Consult: 8/23/17  Reason for Consult:  Care decisions/pain mmt? Requesting Provider: Magda Esquivel   Primary Care Physician: Claudia Hoyos MD      SUMMARY:   Dali Coreas is a 80 y. o. with a past history of  CAD, DM II, HTN, dyslipidemia, AAA, LBBB, GERD, and MI, and PSHx of stent placement x 2, who was admitted on 8/17/2017 from transferred from [facility] ED with a diagnosis of elevated troponin. Current medical issues leading to Palliative Medicine involvement include: severe left hip pain, PNA, canker sores 2/2 drug reaction. PER :  9/2016-5/2017 Tyl #3 #60 q. Month Dr.Kevin Golden  5/2017-6/2017 Norco 5/325mg #30 q. 2 weeks Dr. Latasha Bowers  6/5/2017 Norco 5/325mg #90/23 days Doug Ángel  6/26/17 Norco 5/325mg #30/8 days Latasha Bowers  7/5/17 percocet 5/325mg #90/23 days, Doug Ángel  7/16/17 oxycodone 15 IR #20/4 days, [place] Sit  7/22/17 diazepam 5mg #30/15 days Shayna Wang  7/23/17 oxycodone 15 IR #30/5 days Shayna Wang    24 hour opioid use:  8/28/17: 15mg Percocet  8/27/17: 15mg Percocet  8/26/17: 20mg Percocet  8/25/17: 0.2mg IV Dilaudid, 15mg Percocet  8/24/17: 0.4mg IV Dilaudid, 25mg Percocet  8/23/17: 1 mg IV Dilaudid  8/22/17: 1 mg IV Dilaudid    Psychosocial: lives at home with wife, daughter and DAGO; daughter and DAGO were caring for pt, however, DAGO had to have a hernia repaired so pt was moved to [facility]; DAGO is still recovering, thus, not able to assist with care. PALLIATIVE DIAGNOSES:   1. Chest pain: HCAP  2. Abdominal pain  3. Nausea and vomiting  4. Left hip pain x 3 months (chronic impacted left femoral head fracture): 8/21 hip aspiration  5. Sacral decubitus ulcer: stage 3  6. Diabetic polyneuropathy   7. Obesity   8. Severe protein calorie malnutrition   9. canker sores, MRSA pos  10.  Weight loss (240 lbs on 7/19/17)  (now 227 lbs)  Not eating with canker sores x 2 weeks  11. Opioid induced constipation  12. Hypotension   13. Gait impairment     PLAN:   1. CARE DECISIONS: spoke with wife Mary Alexandre, explained that pt will be ready for discharge tomorrow. Strongly encouraged wife to transfer pt to a facility close to home so she will be able to visit frequently. Pt has medicare, it is unclear at this time if pt has a skillable need that insurance will pay for him to be in a facility; if he does, then medicare will pay for the facility and not Hospice, if he doesn't have a skillable need, then family will have to pay out of pocket for the facility and medicare will pay for Hospice.  is looking into this. In the meantime, discussed with wife meeting with hospice for information. 1. Order placed for hospice info session. 2. D/c lipitor, if pt is discharged to Hospice, there are many other medications that can be discontinued as well. 2. PAIN: .   3. Schedule Percocet 5/325mg q. 6 hours. Will increase frequency to q. 4 hours if necessary. 4. Discontinue Dilaudid   5. Avoid long-acting opioids at this time. Elderly patients in nursing homes typically do poorly with long-acting opioids and are frequently re-admitted for sedation issues. 2. OPIOID INDUCED CONSTIPATION:  Pt has received 2 doses of magnesium citrate and soap suds enemas with very little results. Pt's appetite has been poor, but no nausea or vomiting. If fecal impaction is a concern, consider KUB today, otherwise, continue bowel maintenance. 1. Continue daily Miralax and Pericolace bid. 3. ASYMPTOMATIC HYPOTENSION: 2/2 CAD; pt/family declined cardiac catheterization or other invasive procedures. Pt denies symptoms. 1. DO NOT WITHHOLD PAIN MEDICATION FOR HYPOTENSION. 4. Initial consult note routed to primary continuity provider  5.  Communicated plan of care with: Palliative IDT, RN, , Hospitalist NP     GOALS OF CARE / TREATMENT PREFERENCES: [====Goals of Care====]  GOALS OF CARE:  Patient / health care proxy stated goals:     1. dispo is SNF with hospice support, depending on insurance coverage. 2. Optimal pain control. TREATMENT PREFERENCES:   Code Status: DNR    Advance Care Planning:  Advance Care Planning 8/24/2017   Patient's Healthcare Decision Maker is: Named in scanned ACP document   Primary Decision Maker Name Saroj Allred   Primary Decision Maker Phone Number [phone]   Primary Decision Maker Relationship to Patient Spouse   Secondary Decision Maker Name Ferdinand Colindres   Secondary Decision Maker Phone Number [phone]   Secondary Decision Maker Relationship to Patient Adult child   Confirm Advance Directive Yes, on file       Other:    The palliative care team has discussed with patient / health care proxy about goals of care / treatment preferences for patient.  [====Goals of Care====]         HISTORY:     History obtained from: chart    CHIEF COMPLAINT: left hip pain    HPI/SUBJECTIVE:    The patient is:   [x] Verbal and participatory  [] Non-participatory due to:     BIBA to [facility] ED with c/o intermittent dull mild diffuse abd pain x 2 weeks, intermittent CP for past few weeks that got worse on day of admission, N/V that started day of admission. W/u at [facility] revealed a stable 5.3x5 cm infrarenal AAA, distended gallbladder, mild pelvic free fluid; labs significant for WBC 13.6, AST of 104; rest of LFT's WNL; troponin was 0.32; a central line was placed. Pt reports AAA was diagnosed five years ago and is inoperable. he has history of MI and stent placement x2 and is followed by Dr. Tonia Jin, cardiologist. Pt denies taking blood thinners, takes a daily baby ASA. Pt notes he was diagnosed with PNA four days ago, which is being treated with Levaquin.      8/23:   pt asleep, easily awakened, states his pain is currently 8/10, which he states is tolerable; he received 0.5mg IV dilaudid at 0500 and 11am.  Yesterday he received two doses of 0.5mg dilaudid, also had fentanyl patch 25 mcg/hr removed. 8/24:  Eating lunch; only ate 1 cup ice cream.  Not hungry, does not remember when last BM was. Denies discomfort at this time. 8/28: no BM yet, appetite poor, does not feel bloated, no abd pain. No pain at all unless weight bearing or movement of hip. Clinical Pain Assessment (nonverbal scale for severity on nonverbal patients):   [++++ Clinical Pain Assessment++++]  [++++Pain Severity++++]: Pain: 0/10  [++++Pain Character++++]: sharp  [++++Pain Duration++++]:   \"a long time\"  [++++Pain Effect++++]:   Unable to mobilize  [++++Pain Factors++++]:  movement  [++++Pain Frequency++++]: with movement  [++++Pain Location++++]: left hip  [++++ Clinical Pain Assessment++++]  Duration: for how long has pt been experiencing pain (e.g., 2 days, 1 month, years)  Frequency: how often pain is an issue (e.g., several times per day, once every few days, constant)     FUNCTIONAL ASSESSMENT:     Palliative Performance Scale (PPS):  PPS: 20       PSYCHOSOCIAL/SPIRITUAL SCREENING:     Advance Care Planning:  Advance Care Planning 8/24/2017   Patient's Healthcare Decision Maker is: Named in scanned ACP document   Primary Decision Maker Name Lena Mccoy   Primary Decision Maker Phone Number [phone]   Primary Decision Maker Relationship to Patient Spouse   Secondary Decision Maker Name Neyda Odom   Secondary Decision Maker Phone Number [phone]   Secondary Decision Maker Relationship to Patient Adult child   Confirm Advance Directive Yes, on file        Any spiritual / Yazdanism concerns:  [] Yes /  [x] No    Caregiver Burnout:  [] Yes /  [] No /  [x] No Caregiver Present      Anticipatory grief assessment:   [x] Normal  / [] Maladaptive       ESAS Anxiety: Anxiety: 0    ESAS Depression: Depression: 0        REVIEW OF SYSTEMS:     Positive and pertinent negative findings in ROS are noted above in HPI.   The following systems were [x] reviewed / [] unable to be reviewed as noted in HPI  Other findings are noted below. Systems: constitutional, ears/nose/mouth/throat, respiratory, gastrointestinal, genitourinary, musculoskeletal, integumentary, neurologic, psychiatric, endocrine. Positive findings noted below. Modified ESAS Completed by: provider   Fatigue: 6 Drowsiness: 0   Depression: 0 Pain: 0   Anxiety: 0 Nausea: 0   Anorexia: 7 Dyspnea: 0     Constipation: Yes     Stool Occurrence(s): 1        PHYSICAL EXAM:     From RN flowsheet:  Wt Readings from Last 3 Encounters:   08/28/17 212 lb 12.6 oz (96.5 kg)   08/17/17 217 lb 13 oz (98.8 kg)   08/13/17 220 lb 0.3 oz (99.8 kg)     Blood pressure 132/61, pulse 80, temperature 97.5 °F (36.4 °C), resp. rate 18, height 5' 10\" (1.778 m), weight 212 lb 12.6 oz (96.5 kg), SpO2 97 %.     Pain Scale 1: Numeric (0 - 10)  Pain Intensity 1: 0  Pain Onset 1: chronic  Pain Location 1: Back  Pain Orientation 1: Lower  Pain Description 1: Aching  Pain Intervention(s) 1: Medication (see MAR)  Last bowel movement, if known: PT DOES NOT REMEMBER, NO BM DOCUMENTED THIS ADMISSION    Constitutional: elderly, obese, awake, alert, oriented to self, situation, place  Eyes: pupils equal, anicteric  ENMT: no nasal discharge, moist mucous membranes  Cardiovascular: regular rhythm, distal pulses intact  Respiratory: breathing not labored, symmetric  Gastrointestinal: soft non-tender, +bowel sounds  Musculoskeletal: no deformity, pain with left hip passive ROM  Skin: warm, dry, stage 3 decubitus ulcer  Neurologic: following commands, moving all extremities  Psychiatric: full affect, no hallucinations         HISTORY:     Active Problems:    Coronary artery disease involving native coronary artery of native heart without angina pectoris (7/14/2017)      Essential hypertension (7/14/2017)      Abdominal pain (8/17/2017)      Elevated troponin (8/17/2017)      CAP (community acquired pneumonia) (8/19/2017)      Therapeutic opioid-induced constipation (OIC) (8/23/2017)      Chronic left hip pain (8/23/2017)      Weight loss (8/23/2017)      Abnormality of gait due to impairment of balance (8/23/2017)      Anorexia (8/24/2017)      Idiopathic hypotension (8/28/2017)      Past Medical History:   Diagnosis Date    AAA (abdominal aortic aneurysm) (HCC)     Asthma     CAD (coronary artery disease)     Mitzy MPI 7/14 LVEF 45, chronic inf-lat ischemia; Echo 7/14 LVEF 45, mod LVH, mild LAE, mild AI/MR    [facility]     DJD (degenerative joint disease)     DM II (diabetes mellitus, type II), controlled ([facility]     Dyslipidemia     GERD (gastroesophageal reflux disease)     History of left bundle branch block (LBBB)     HTN (hypertension)     Myocardial infarction ([facility]     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    Pneumonia     Prostate CA ([facility] 2012    s/p XRT, hormonal      Past Surgical History:   Procedure Laterality Date    HX APPENDECTOMY      HX HEMORRHOIDECTOMY      HX PTCA      x 2 stents, vessel unknown, last 8 yrs ago      Family History   Problem Relation Age of Onset    Tuberculosis Mother     Asthma Father     Coronary Artery Disease Sister     Asthma Sister       History reviewed, no pertinent family history.   Social History   Substance Use Topics    Smoking status: Former Smoker     Types: Cigarettes     Quit date: 7/14/1997    Smokeless tobacco: Never Used    Alcohol use No     Allergies   Allergen Reactions    Contrast Agent [Iodine] Hives    Levaquin [Levofloxacin] Unknown (comments)    Sulfa (Sulfonamide Antibiotics) Unknown (comments)      Current Facility-Administered Medications   Medication Dose Route Frequency    magnesium oxide (MAG-OX) tablet 400 mg  400 mg Oral BID    furosemide (LASIX) tablet 20 mg  20 mg Oral DAILY    HYDROmorphone (PF) (DILAUDID) injection 0.2 mg  0.2 mg IntraVENous Q6H PRN    bisacodyl (DULCOLAX) suppository 10 mg  10 mg Rectal DAILY PRN    oxyCODONE-acetaminophen (PERCOCET) 5-325 mg per tablet 1 Tab  1 Tab Oral Q6H PRN    gabapentin (NEURONTIN) capsule 600 mg  600 mg Oral TID    chlorhexidine (PERIDEX) 0.12 % mouthwash 15 mL  15 mL Oral Q12H    albuterol-ipratropium (DUO-NEB) 2.5 MG-0.5 MG/3 ML  3 mL Nebulization Q6H PRN    pantoprazole (PROTONIX) tablet 40 mg  40 mg Oral ACB    midodrine (PROAMITINE) tablet 5 mg  5 mg Oral TID WITH MEALS    potassium chloride SR (KLOR-CON 10) tablet 20 mEq  20 mEq Oral DAILY    acetaminophen (TYLENOL) tablet 650 mg  650 mg Oral Q4H PRN    labetalol (NORMODYNE;TRANDATE) injection 10 mg  10 mg IntraVENous Q6H PRN    lactobac ac& pc-s.therm-b.anim (DULCE Q/RISAQUAD)  1 Cap Oral DAILY    ranolazine ER (RANEXA) tablet 500 mg  500 mg Oral BID    balsam peru-castor oil (VENELEX)  mg/gram ointment   Topical DAILY    aspirin delayed-release tablet 81 mg  81 mg Oral DAILY    atorvastatin (LIPITOR) tablet 10 mg  10 mg Oral DAILY    DULoxetine (CYMBALTA) capsule 30 mg  30 mg Oral DAILY    isosorbide mononitrate ER (IMDUR) tablet 30 mg  30 mg Oral DAILY    levothyroxine (SYNTHROID) tablet 50 mcg  50 mcg Oral ACB    montelukast (SINGULAIR) tablet 10 mg  10 mg Oral DAILY    nitroglycerin (NITROSTAT) tablet 0.4 mg  0.4 mg SubLINGual PRN    tamsulosin (FLOMAX) capsule 0.8 mg  0.8 mg Oral DAILY    ondansetron (ZOFRAN) injection 4 mg  4 mg IntraVENous Q4H PRN    sodium chloride (NS) flush 5-10 mL  5-10 mL IntraVENous Q8H    sodium chloride (NS) flush 5-10 mL  5-10 mL IntraVENous PRN    naloxone (NARCAN) injection 0.4 mg  0.4 mg IntraVENous PRN    enoxaparin (LOVENOX) injection 40 mg  40 mg SubCUTAneous DAILY    insulin lispro (HUMALOG) injection   SubCUTAneous AC&HS    glucose chewable tablet 16 g  4 Tab Oral PRN    glucagon (GLUCAGEN) injection 1 mg  1 mg IntraMUSCular PRN    dextrose 10% infusion 125-250 mL  125-250 mL IntraVENous PRN    senna-docusate (PERICOLACE) 8.6-50 mg per tablet 1 Tab  1 Tab Oral BID    polyethylene glycol (MIRALAX) packet 17 g  17 g Oral DAILY          LAB AND IMAGING FINDINGS:     Lab Results   Component Value Date/Time    WBC 5.1 08/29/2017 03:24 AM    HGB 7.6 08/29/2017 03:24 AM    PLATELET 218 56/05/3192 03:24 AM     Lab Results   Component Value Date/Time    Sodium 134 08/29/2017 03:24 AM    Potassium 3.9 08/29/2017 03:24 AM    Chloride 95 08/29/2017 03:24 AM    CO2 36 08/29/2017 03:24 AM    BUN 9 08/29/2017 03:24 AM    Creatinine 0.57 08/29/2017 03:24 AM    Calcium 8.0 08/29/2017 03:24 AM    Magnesium 1.7 08/29/2017 03:24 AM      Lab Results   Component Value Date/Time    AST (SGOT) 14 08/29/2017 03:24 AM    Alk. phosphatase 83 08/29/2017 03:24 AM    Protein, total 5.4 08/29/2017 03:24 AM    Albumin 1.5 08/29/2017 03:24 AM    Globulin 3.9 08/29/2017 03:24 AM     Lab Results   Component Value Date/Time    INR 1.4 08/17/2017 02:39 PM    Prothrombin time 15.7 08/17/2017 02:39 PM    aPTT 20.4 08/17/2017 02:39 PM      No results found for: IRON, FE, TIBC, IBCT, PSAT, FERR   No results found for: PH, PCO2, PO2  No components found for: [place]   Lab Results   Component Value Date/Time    CK 23 08/17/2017 05:30 PM    CK - MB 1.8 08/17/2017 05:30 PM                Total time: 45 min  Counseling / coordination time, spent as noted above: 35 min  > 50% counseling / coordination?: yes  Prolonged service was provided for  []30 min   []75 min in face to face time in the presence of the patient, spent as noted above. Time Start:   Time End:   Note: this can only be billed with 00815 (initial) or 98751 (follow up). If multiple start / stop times, list each separately.

## 2017-08-29 NOTE — PROGRESS NOTES
PCU SHIFT NURSING NOTE      Bedside shift change report given to Arben Morrell RN (oncoming nurse) by Erika Brown (offgoing nurse). Report included the following information Kardex, Intake/Output, MAR and Recent Results. Shift Summary:   1920: Patient resting. Complaining of abdominal pain. Patient had mag Citrate earlier but still has not had a bowel movement. Dr. Juan C Perez notified and stated he would place an order for a dulcolax suppository. 2020: Patient medicated with percocet as ordered and dulcolax given. Admission Date 8/17/2017   Admission Diagnosis Abdominal pain  Elevated troponin   Consults IP CONSULT TO CARDIOLOGY  IP CONSULT TO ORTHOPEDIC SURGERY  IP CONSULT TO PALLIATIVE CARE - PROVIDER  IP CONSULT TO PULMONOLOGY        Consults   []PT   []OT   []Speech   []Case Management      [] Palliative      Cardiac Monitoring Order   []Yes   []No     IV drips   []Yes    Drip:                            Dose:  Drip:                            Dose:  Drip:                            Dose:   []No     GI Prophylaxis   []Yes   []No         DVT Prophylaxis   SCDs:             Jesus stockings:         [] Medication   []Contraindicated   []None      Activity Level Activity Level: Bed Rest     Activity Assistance: Complete care   Purposeful Rounding every 1-2 hour? []Yes   Miller Score  Total Score: 3   Bed Alarm (If score 3 or >)   []Yes   [] Refused (See signed refusal form in chart)   Kwan Score  Kwan Score: 12   Kwan Score (if score 14 or less)   []PMT consult   []Wound Care consult      []Specialty bed   [] Nutrition consult          Needs prior to discharge:   Home O2 required:    []Yes   []No    If yes, how much O2 required?     Other:    Last Bowel Movement: Last Bowel Movement Date: 08/26/17      Influenza Vaccine Received Flu Vaccine for Current Season (usually Sept-March): Not Flu Season        Pneumonia Vaccine           Diet Active Orders   Diet    DIET DYSPHAGIA ADVANCED SOFT (NDD3)      LDAs Triple Lumen 08/17/17 Right;Upper Subclavian (Active)   Central Line Being Utilized Yes 8/28/2017  8:52 PM   Criteria for Appropriate Use Limited/no vessel suitable for conventional peripheral access 8/28/2017  8:52 PM   Site Assessment Clean, dry, & intact 8/28/2017  8:52 PM   Infiltration Assessment 0 8/28/2017  8:52 PM   Affected Extremity/Extremities Color distal to insertion site pink (or appropriate for race) 8/28/2017  8:52 PM   Date of Last Dressing Change 08/24/17 8/28/2017  8:52 PM   Dressing Status Clean, dry, & intact 8/28/2017  8:52 PM   Dressing Type Disk with Chlorhexadine gluconate (CHG) 8/28/2017  8:52 PM   Action Taken Open ports on tubing capped 8/28/2017  8:52 PM   Proximal Hub Color/Line Status Brown;Capped 8/28/2017  8:52 PM   Positive Blood Return (Medial Site) Yes 8/28/2017  8:52 PM   Medial Hub Color/Line Status Blue;Capped 8/28/2017  8:52 PM   Positive Blood Return (Lateral Site) Yes 8/28/2017  8:52 PM   Distal Hub Color/Line Status White;Capped 8/28/2017  8:52 PM   Positive Blood Return (Site #3) Yes 8/28/2017  8:52 PM   External Catheter Length (cm) 5 centimeters 8/17/2017  3:01 PM   Alcohol Cap Used Yes 8/28/2017  5:00 PM              Condom Catheter 08/20/17 (Active)   Criteria for Appropriate Use Strict I/Os 8/28/2017  8:52 PM   Status Draining 8/28/2017  8:52 PM   Site Condition No abnormalities 8/28/2017  8:52 PM   Drainage Tube Clipped to Bed Yes 8/28/2017  8:52 PM   Catheter Secured to Thigh Yes 8/28/2017  8:52 PM   Tamper Seal Intact Yes 8/28/2017  8:52 PM   Bag Below Bladder/Not on Floor Yes 8/28/2017  8:52 PM   Lack of Dependent Loop in Tubing Yes 8/28/2017  8:52 PM   Drainage Bag Less Than Half Full Yes 8/28/2017  8:52 PM   Sterile Solution Used for  Irrigation N/A 8/28/2017  8:52 PM   Urine Output (mL) 500 ml 8/28/2017  5:00 PM                Urinary Catheter Condom Catheter 08/20/17-Criteria for Appropriate Use: Strict I/Os    Intake & Output   Date 08/27/17 1900 - 08/28/17 0659 08/28/17 0700 - 08/29/17 0659   Shift 5807-9249 24 Hour Total 6853-1938 0518-9238 24 Hour Total   I  N  T  A  K  E   I.V.  (mL/kg/hr)  350 0  (0)  0      Volume (dextrose 10% infusion 125-250 mL)  0 0  0      Volume (vancomycin (VANCOCIN) 1250 mg in  ml infusion)  250         Volume (cefepime (MAXIPIME) 2 g in 0.9% sodium chloride (MBP/ADV) 100 mL)  100       Shift Total  (mL/kg)  350  (3.6) 0  (0)  0  (0)   O  U  T  P  U  T   Urine  (mL/kg/hr)  900 1500  (1.3)  1500      Urine Output (mL) (Condom Catheter 08/20/17)  900 1500  1500    Shift Total  (mL/kg)  900  (9.4) 1500  (15.5)  1500  (15.5)   NET  -550 -1500  -1500   Weight (kg) 96.1 96.1 96.5 96.5 96.5         Readmission Risk Assessment Tool Score High Risk            33       Total Score        3 Has Seen PCP in Last 6 Months (Yes=3, No=0)    2 . Living with Significant Other. Assisted Living. LTAC. SNF. or   Rehab    3 Patient Length of Stay (>5 days = 3)    4 IP Visits Last 12 Months (1-3=4, 4=9, >4=11)    5 Pt.  Coverage (Medicare=5 , Medicaid, or Self-Pay=4)    16 Charlson Comorbidity Score (Age + Comorbid Conditions)       Expected Length of Stay 1d 21h   Actual Length of Stay 11

## 2017-08-29 NOTE — PROGRESS NOTES
PCU SHIFT NURSING NOTE      Bedside shift change report given to Mary Chaidez RN  (oncoming nurse) by Alex Pacheco (offgoing nurse). Report included the following information Kardex, Intake/Output, MAR and Recent Results. Shift Summary:   1912:     Admission Date 8/17/2017   Admission Diagnosis Abdominal pain  Elevated troponin   Consults IP CONSULT TO CARDIOLOGY  IP CONSULT TO ORTHOPEDIC SURGERY  IP CONSULT TO PALLIATIVE CARE - PROVIDER  IP CONSULT TO PULMONOLOGY        Consults   []PT   []OT   []Speech   []Case Management      [] Palliative      Cardiac Monitoring Order   []Yes   []No     IV drips   []Yes    Drip:                            Dose:  Drip:                            Dose:  Drip:                            Dose:   []No     GI Prophylaxis   []Yes   []No         DVT Prophylaxis   SCDs:             Jesus stockings:         [] Medication   []Contraindicated   []None      Activity Level Activity Level: Bed Rest     Activity Assistance: Complete care   Purposeful Rounding every 1-2 hour? []Yes   Miller Score  Total Score: 3   Bed Alarm (If score 3 or >)   []Yes   [] Refused (See signed refusal form in chart)   Kwan Score  Kwan Score: 12   Kwan Score (if score 14 or less)   []PMT consult   []Wound Care consult      []Specialty bed   [] Nutrition consult          Needs prior to discharge:   Home O2 required:    []Yes   []No    If yes, how much O2 required?     Other:    Last Bowel Movement: Last Bowel Movement Date: 08/28/17      Influenza Vaccine Received Flu Vaccine for Current Season (usually Sept-March): Not Flu Season        Pneumonia Vaccine           Diet Active Orders   Diet    DIET DYSPHAGIA ADVANCED SOFT (NDD3)      LDAs           Triple Lumen 08/17/17 Right;Upper Subclavian (Active)   Central Line Being Utilized Yes 8/29/2017  7:57 PM   Criteria for Appropriate Use Limited/no vessel suitable for conventional peripheral access 8/29/2017  7:57 PM   Site Assessment Clean, dry, & intact 8/29/2017 7:57 PM   Infiltration Assessment 0 8/29/2017  3:00 PM   Affected Extremity/Extremities Color distal to insertion site pink (or appropriate for race) 8/29/2017  3:00 PM   Date of Last Dressing Change 08/24/17 8/29/2017  3:00 PM   Dressing Status Clean, dry, & intact 8/29/2017  3:00 PM   Dressing Type Disk with Chlorhexadine gluconate (CHG); Transparent 8/29/2017  3:00 PM   Action Taken Open ports on tubing capped 8/29/2017  3:00 PM   Proximal Hub Color/Line Status Brown;Capped;Flushed 8/29/2017  3:00 PM   Positive Blood Return (Medial Site) Yes 8/29/2017  3:00 PM   Medial Hub Color/Line Status Blue;Capped;Flushed 8/29/2017  3:00 PM   Positive Blood Return (Lateral Site) Yes 8/29/2017  3:00 PM   Distal Hub Color/Line Status White;Capped;Flushed 8/29/2017  3:00 PM   Positive Blood Return (Site #3) Yes 8/29/2017  3:00 PM   External Catheter Length (cm) 5 centimeters 8/17/2017  3:01 PM   Alcohol Cap Used Yes 8/29/2017  3:00 PM              Condom Catheter 08/20/17 (Active)   Criteria for Appropriate Use Obstruction/retention;Strict I/Os; Multiple Stage 3 or 4  pressure ulcers, chest to knees 8/29/2017  3:00 PM   Status Draining 8/29/2017  3:00 PM   Site Condition No abnormalities 8/29/2017  3:00 PM   Drainage Tube Clipped to Bed Yes 8/29/2017  3:00 PM   Catheter Secured to Thigh Yes 8/29/2017  3:00 PM   Tamper Seal Intact Yes 8/29/2017  3:00 PM   Bag Below Bladder/Not on Floor Yes 8/29/2017  3:00 PM   Lack of Dependent Loop in Tubing Yes 8/29/2017  3:00 PM   Drainage Bag Less Than Half Full Yes 8/29/2017  3:00 PM   Sterile Solution Used for  Irrigation N/A 8/29/2017  3:00 PM   Irrigation Volume Input (mL) 0 ml 8/29/2017  4:06 AM   Urine Output (mL) 300 ml 8/29/2017  3:00 PM                Urinary Catheter Condom Catheter 08/20/17-Criteria for Appropriate Use: Obstruction/retention, Strict I/Os, Multiple Stage 3 or 4  pressure ulcers, chest to knees    Intake & Output   Date 08/28/17 1900 - 08/29/17 0659 08/29/17 0700 - 08/30/17 0659   Shift 0997-6449 24 Hour Total 2480-7228 6531-1150 24 Hour Total   I  N  T  A  K  E   I.V.  (mL/kg/hr)  0 0  (0)  0      Volume (dextrose 10% infusion 125-250 mL)  0 0  0    Other 0 0         Irrigation Volume Input (mL) (Condom Catheter 08/20/17) 0 0       Shift Total  (mL/kg) 0  (0) 0  (0) 0  (0)  0  (0)   O  U  T  P  U  T   Urine  (mL/kg/hr)  1500 1175  (1)  1175      Urine Output (mL) (Condom Catheter 08/20/17)  1500 1175  1175    Stool           Stool Occurrence(s)   2 x  2 x    Shift Total  (mL/kg)  1500  (15.5) 1175  (12.2)  1175  (12.2)   NET 0 -1500 -1175  -1175   Weight (kg) 96.5 96.5 96.5 96.5 96.5         Readmission Risk Assessment Tool Score High Risk            33       Total Score        3 Has Seen PCP in Last 6 Months (Yes=3, No=0)    2 . Living with Significant Other. Assisted Living. LTAC. SNF. or   Rehab    3 Patient Length of Stay (>5 days = 3)    4 IP Visits Last 12 Months (1-3=4, 4=9, >4=11)    5 Pt.  Coverage (Medicare=5 , Medicaid, or Self-Pay=4)    16 Charlson Comorbidity Score (Age + Comorbid Conditions)       Expected Length of Stay 1d 21h   Actual Length of Stay 12

## 2017-08-29 NOTE — PROGRESS NOTES
Problem: Falls - Risk of  Goal: *Absence of Falls  Document Maximino Fall Risk and appropriate interventions in the flowsheet.    Outcome: Progressing Towards Goal  Fall Risk Interventions:  Mobility Interventions: Bed/chair exit alarm, Patient to call before getting OOB     Mentation Interventions: Bed/chair exit alarm     Medication Interventions: Patient to call before getting OOB, Bed/chair exit alarm, Teach patient to arise slowly, Evaluate medications/consider consulting pharmacy     Elimination Interventions: Bed/chair exit alarm, Call light in reach     History of Falls Interventions: Bed/chair exit alarm, Room close to nurse's station

## 2017-08-29 NOTE — PROGRESS NOTES
CM spoke with Fadumo Rascon, Admissions Director  [phone] and [facility] contract with [facility] in [facility]  Admission Director  [facility]  [phone]    Ms. Montano informed CM she has spoken with Zhao Bolivar (daughter) regarding admitting to [facility] and Hospice services. CM spoke with Ms. Radha De León by phone. Ms. Dewayne Potter would like the names of facilities near the hospital.  Family is deciding on location at this time. Ms. Pierce Rukhsana had many questions regarding Hospice care in facilities. CM deferred questions to the facility of choice and Hospice provider of choice. Ms. Radha De León would appreicate assistance with applying for Medicaid for LTC/Hospice care. CM will inform [employer].     Chelo Mejia RN   Ext [phone]

## 2017-08-29 NOTE — PROGRESS NOTES
Chart reviewed. Noted that pt and family are having meeting on decision for hospice/comfort measures. Will defer at this time however continue to follow as appropriate. Ni Kamara.  Crow Calvillo, FÉLIXT

## 2017-08-29 NOTE — PROGRESS NOTES
PCU SHIFT NURSING NOTE      Bedside and Verbal shift change report given to Carol Ann Marino RN (oncoming nurse) by Roderick Foster RN (offgoing nurse). Report included the following information SBAR, Kardex, ED Summary, Procedure Summary, Intake/Output, MAR, Recent Results, Med Rec Status, Cardiac Rhythm NSR  and Alarm Parameters . Shift Summary:         Admission Date 8/17/2017   Admission Diagnosis Abdominal pain  Elevated troponin   Consults IP CONSULT TO CARDIOLOGY  IP CONSULT TO ORTHOPEDIC SURGERY  IP CONSULT TO PALLIATIVE CARE - PROVIDER  IP CONSULT TO PULMONOLOGY        Consults   [x]PT   [x]OT   [x]Speech   [x]Case Management      [x] Palliative      Cardiac Monitoring Order   [x]Yes   []No     IV drips   []Yes    Drip:                            Dose:  Drip:                            Dose:  Drip:                            Dose:   [x]No     GI Prophylaxis   [x]Yes   []No         DVT Prophylaxis   SCDs:             Jesus stockings:         [x] Medication   []Contraindicated   []None      Activity Level Activity Level: Bed Rest     Activity Assistance: Complete care   Purposeful Rounding every 1-2 hour? [x]Yes   Miller Score  Total Score: 3   Bed Alarm (If score 3 or >)   [x]Yes   [] Refused (See signed refusal form in chart)   Kwan Score  Kwan Score: 12   Kwan Score (if score 14 or less)   [x]PMT consult   [x]Wound Care consult      [x]Specialty bed   [x] Nutrition consult          Needs prior to discharge:   Home O2 required:    [x]Yes   []No    If yes, how much O2 required?     Other:    Last Bowel Movement: Last Bowel Movement Date: 08/28/17      Influenza Vaccine Received Flu Vaccine for Current Season (usually Sept-March): Not Flu Season        Pneumonia Vaccine           Diet Active Orders   Diet    DIET DYSPHAGIA ADVANCED SOFT (NDD3)      LDAs           Triple Lumen 08/17/17 Right;Upper Subclavian (Active)   Central Line Being Utilized Yes 8/29/2017  7:33 AM   Criteria for Appropriate Use Limited/no vessel suitable for conventional peripheral access 8/29/2017  7:33 AM   Site Assessment Clean, dry, & intact 8/29/2017  7:33 AM   Infiltration Assessment 0 8/29/2017  7:33 AM   Affected Extremity/Extremities Color distal to insertion site pink (or appropriate for race) 8/29/2017  7:33 AM   Date of Last Dressing Change 08/24/17 8/29/2017  7:33 AM   Dressing Status Clean, dry, & intact 8/29/2017  7:33 AM   Dressing Type Disk with Chlorhexadine gluconate (CHG); Transparent 8/29/2017  7:33 AM   Action Taken Open ports on tubing capped 8/29/2017  7:33 AM   Proximal Hub Color/Line Status Brown;Capped;Flushed 8/29/2017  7:33 AM   Positive Blood Return (Medial Site) Yes 8/29/2017  7:33 AM   Medial Hub Color/Line Status Blue;Capped;Flushed 8/29/2017  7:33 AM   Positive Blood Return (Lateral Site) Yes 8/29/2017  7:33 AM   Distal Hub Color/Line Status White;Capped;Flushed 8/29/2017  7:33 AM   Positive Blood Return (Site #3) Yes 8/29/2017  7:33 AM   External Catheter Length (cm) 5 centimeters 8/17/2017  3:01 PM   Alcohol Cap Used Yes 8/29/2017  7:33 AM              Condom Catheter 08/20/17 (Active)   Criteria for Appropriate Use Strict I/Os; Multiple Stage 3 or 4  pressure ulcers, chest to knees 8/29/2017  7:33 AM   Status Draining 8/29/2017  7:33 AM   Site Condition No abnormalities 8/29/2017  7:33 AM   Drainage Tube Clipped to Bed Yes 8/29/2017  7:33 AM   Catheter Secured to Thigh Yes 8/29/2017  7:33 AM   Tamper Seal Intact Yes 8/29/2017  7:33 AM   Bag Below Bladder/Not on Floor Yes 8/29/2017  7:33 AM   Lack of Dependent Loop in Tubing Yes 8/29/2017  7:33 AM   Drainage Bag Less Than Half Full Yes 8/29/2017  7:33 AM   Sterile Solution Used for  Irrigation N/A 8/29/2017  7:33 AM   Irrigation Volume Input (mL) 0 ml 8/29/2017  4:06 AM   Urine Output (mL) 600 ml 8/29/2017  7:33 AM                Urinary Catheter Condom Catheter 08/20/17-Criteria for Appropriate Use: Strict I/Os, Multiple Stage 3 or 4  pressure ulcers, chest to knees    Intake & Output   Date 08/28/17 0700 - 08/29/17 0659 08/29/17 0700 - 08/30/17 0659   Shift 0700-1859 1900-0659 24 Hour Total 0700-1859 1900-0659 24 Hour Total   I  N  T  A  K  E   I.V.  (mL/kg/hr) 0  (0)  0  (0) 0  0      Volume (dextrose 10% infusion 125-250 mL) 0  0 0  0    Other  0 0         Irrigation Volume Input (mL) (Condom Catheter 08/20/17)  0 0       Shift Total  (mL/kg) 0  (0) 0  (0) 0  (0) 0  (0)  0  (0)   O  U  T  P  U  T   Urine  (mL/kg/hr) 1500  (1.3)  1500  (0.6) 600  600      Urine Output (mL) (Condom Catheter 08/20/17) 1500  1500 600  600    Stool            Stool Occurrence(s)    1 x  1 x    Shift Total  (mL/kg) 1500  (15.5)  1500  (15.5) 600  (6.2)  600  (6.2)   NET -1500 0 -1500 -600  -600   Weight (kg) 96.5 96.5 96.5 96.5 96.5 96.5         Readmission Risk Assessment Tool Score High Risk            33       Total Score        3 Has Seen PCP in Last 6 Months (Yes=3, No=0)    2 . Living with Significant Other. Assisted Living. LTAC. SNF. or   Rehab    3 Patient Length of Stay (>5 days = 3)    4 IP Visits Last 12 Months (1-3=4, 4=9, >4=11)    5 Pt.  Coverage (Medicare=5 , Medicaid, or Self-Pay=4)    16 Charlson Comorbidity Score (Age + Comorbid Conditions)       Expected Length of Stay 1d 21h   Actual Length of Stay 12

## 2017-08-29 NOTE — PROGRESS NOTES
Cm spoke with [facility] rep. Due to dual insurance patient will not qualify for assistance for Medicaid. [address] asked CM to follow up with [facility] and Rehab regarding skilled care for Stage 4 wound ulcer. Cm spoke with Justin Lyons at [facility] and Rehab  [phone]. Zeinab Montano Admissions Director will research and follow up with CM. Consult for [facility] has been placed. Referral submitted via ecin. Ms. Iron Gimenez called  - CM reviewed with [facility] hospice consult and pending status at [facility]. Ms. [facility] has deferred all communication to daughter. CM will follow up in the  Morning.     Magali Hussein RN CM  Ext [phone]

## 2017-08-29 NOTE — PROGRESS NOTES
Noted that pt is having family meeting on decision for hospice/comfort measures. Will defer and follow up at a later time.

## 2017-08-30 VITALS
DIASTOLIC BLOOD PRESSURE: 51 MMHG | BODY MASS INDEX: 30.46 KG/M2 | RESPIRATION RATE: 16 BRPM | SYSTOLIC BLOOD PRESSURE: 101 MMHG | OXYGEN SATURATION: 95 % | HEIGHT: 70 IN | TEMPERATURE: 97.8 F | WEIGHT: 212.74 LBS | HEART RATE: 79 BPM

## 2017-08-30 LAB
ANION GAP SERPL CALC-SCNC: 1 MMOL/L (ref 5–15)
BASOPHILS # BLD: 0 K/UL (ref 0–0.1)
BASOPHILS NFR BLD: 0 % (ref 0–1)
BUN SERPL-MCNC: 10 MG/DL (ref 6–20)
BUN/CREAT SERPL: 19 (ref 12–20)
CALCIUM SERPL-MCNC: 7.9 MG/DL (ref 8.5–10.1)
CHLORIDE SERPL-SCNC: 94 MMOL/L (ref 97–108)
CO2 SERPL-SCNC: 38 MMOL/L (ref 21–32)
CREAT SERPL-MCNC: 0.54 MG/DL (ref 0.7–1.3)
EOSINOPHIL # BLD: 0.2 K/UL (ref 0–0.4)
EOSINOPHIL NFR BLD: 3 % (ref 0–7)
ERYTHROCYTE [DISTWIDTH] IN BLOOD BY AUTOMATED COUNT: 15.4 % (ref 11.5–14.5)
GLUCOSE BLD STRIP.AUTO-MCNC: 128 MG/DL (ref 65–100)
GLUCOSE BLD STRIP.AUTO-MCNC: 148 MG/DL (ref 65–100)
GLUCOSE SERPL-MCNC: 124 MG/DL (ref 65–100)
HCT VFR BLD AUTO: 24.4 % (ref 36.6–50.3)
HGB BLD-MCNC: 7.7 G/DL (ref 12.1–17)
LYMPHOCYTES # BLD: 0.9 K/UL (ref 0.8–3.5)
LYMPHOCYTES NFR BLD: 17 % (ref 12–49)
MCH RBC QN AUTO: 31.8 PG (ref 26–34)
MCHC RBC AUTO-ENTMCNC: 31.6 G/DL (ref 30–36.5)
MCV RBC AUTO: 100.8 FL (ref 80–99)
MONOCYTES # BLD: 0.7 K/UL (ref 0–1)
MONOCYTES NFR BLD: 14 % (ref 5–13)
NEUTS SEG # BLD: 3.5 K/UL (ref 1.8–8)
NEUTS SEG NFR BLD: 66 % (ref 32–75)
PLATELET # BLD AUTO: 205 K/UL (ref 150–400)
POTASSIUM SERPL-SCNC: 3.9 MMOL/L (ref 3.5–5.1)
RBC # BLD AUTO: 2.42 M/UL (ref 4.1–5.7)
SERVICE CMNT-IMP: ABNORMAL
SERVICE CMNT-IMP: ABNORMAL
SODIUM SERPL-SCNC: 133 MMOL/L (ref 136–145)
WBC # BLD AUTO: 5.3 K/UL (ref 4.1–11.1)

## 2017-08-30 PROCEDURE — 77030018719 HC DRSG PTCH ANTIMIC J&J -A

## 2017-08-30 PROCEDURE — 36415 COLL VENOUS BLD VENIPUNCTURE: CPT | Performed by: NURSE PRACTITIONER

## 2017-08-30 PROCEDURE — 74011250637 HC RX REV CODE- 250/637: Performed by: NURSE PRACTITIONER

## 2017-08-30 PROCEDURE — 74011250637 HC RX REV CODE- 250/637: Performed by: INTERNAL MEDICINE

## 2017-08-30 PROCEDURE — 97110 THERAPEUTIC EXERCISES: CPT

## 2017-08-30 PROCEDURE — 77010033678 HC OXYGEN DAILY

## 2017-08-30 PROCEDURE — 85025 COMPLETE CBC W/AUTO DIFF WBC: CPT | Performed by: NURSE PRACTITIONER

## 2017-08-30 PROCEDURE — 82962 GLUCOSE BLOOD TEST: CPT

## 2017-08-30 PROCEDURE — 74011250636 HC RX REV CODE- 250/636: Performed by: INTERNAL MEDICINE

## 2017-08-30 PROCEDURE — 97110 THERAPEUTIC EXERCISES: CPT | Performed by: OCCUPATIONAL THERAPIST

## 2017-08-30 PROCEDURE — 80048 BASIC METABOLIC PNL TOTAL CA: CPT | Performed by: NURSE PRACTITIONER

## 2017-08-30 RX ORDER — IPRATROPIUM BROMIDE AND ALBUTEROL SULFATE 2.5; .5 MG/3ML; MG/3ML
3 SOLUTION RESPIRATORY (INHALATION)
Qty: 30 NEBULE | Refills: 0 | Status: SHIPPED
Start: 2017-08-30

## 2017-08-30 RX ORDER — AMOXICILLIN 250 MG
1 CAPSULE ORAL 2 TIMES DAILY
Qty: 60 TAB | Refills: 0 | Status: SHIPPED
Start: 2017-08-30

## 2017-08-30 RX ORDER — FUROSEMIDE 20 MG/1
20 TABLET ORAL DAILY
Qty: 30 TAB | Refills: 0 | Status: SHIPPED
Start: 2017-08-30

## 2017-08-30 RX ORDER — OXYCODONE AND ACETAMINOPHEN 5; 325 MG/1; MG/1
1 TABLET ORAL EVERY 6 HOURS
Qty: 10 TAB | Refills: 0 | Status: SHIPPED | OUTPATIENT
Start: 2017-08-30

## 2017-08-30 RX ORDER — PANTOPRAZOLE SODIUM 40 MG/1
40 TABLET, DELAYED RELEASE ORAL
Qty: 30 TAB | Refills: 0 | Status: SHIPPED
Start: 2017-08-30

## 2017-08-30 RX ORDER — RANOLAZINE 500 MG/1
500 TABLET, EXTENDED RELEASE ORAL 2 TIMES DAILY
Qty: 60 TAB | Refills: 0 | Status: SHIPPED | OUTPATIENT
Start: 2017-08-30

## 2017-08-30 RX ORDER — POLYETHYLENE GLYCOL 3350 17 G/17G
17 POWDER, FOR SOLUTION ORAL DAILY
Qty: 30 PACKET | Refills: 0 | Status: SHIPPED | OUTPATIENT
Start: 2017-08-30

## 2017-08-30 RX ORDER — LANOLIN ALCOHOL/MO/W.PET/CERES
400 CREAM (GRAM) TOPICAL 2 TIMES DAILY
Qty: 60 TAB | Refills: 0 | Status: SHIPPED
Start: 2017-08-30

## 2017-08-30 RX ORDER — POTASSIUM CHLORIDE 1500 MG/1
20 TABLET, FILM COATED, EXTENDED RELEASE ORAL DAILY
Qty: 30 TAB | Refills: 0 | Status: SHIPPED
Start: 2017-08-30

## 2017-08-30 RX ORDER — MIDODRINE HYDROCHLORIDE 5 MG/1
5 TABLET ORAL
Qty: 90 TAB | Refills: 0 | Status: SHIPPED
Start: 2017-08-30 | End: 2017-09-29

## 2017-08-30 RX ADMIN — RANOLAZINE 500 MG: 500 TABLET, FILM COATED, EXTENDED RELEASE ORAL at 08:45

## 2017-08-30 RX ADMIN — CHLORHEXIDINE GLUCONATE 15 ML: 1.2 RINSE ORAL at 08:46

## 2017-08-30 RX ADMIN — LEVOTHYROXINE SODIUM 50 MCG: 50 TABLET ORAL at 08:44

## 2017-08-30 RX ADMIN — Medication 1 CAPSULE: at 08:44

## 2017-08-30 RX ADMIN — Medication 10 ML: at 14:09

## 2017-08-30 RX ADMIN — Medication 400 MG: at 08:44

## 2017-08-30 RX ADMIN — ASPIRIN 81 MG: 81 TABLET, COATED ORAL at 08:45

## 2017-08-30 RX ADMIN — MONTELUKAST SODIUM 10 MG: 10 TABLET, FILM COATED ORAL at 08:45

## 2017-08-30 RX ADMIN — DULOXETINE HYDROCHLORIDE 30 MG: 30 CAPSULE, DELAYED RELEASE ORAL at 08:45

## 2017-08-30 RX ADMIN — MIDODRINE HYDROCHLORIDE 5 MG: 5 TABLET ORAL at 14:09

## 2017-08-30 RX ADMIN — OXYCODONE HYDROCHLORIDE AND ACETAMINOPHEN 1 TABLET: 5; 325 TABLET ORAL at 05:52

## 2017-08-30 RX ADMIN — Medication 10 ML: at 05:53

## 2017-08-30 RX ADMIN — TAMSULOSIN HYDROCHLORIDE 0.8 MG: 0.4 CAPSULE ORAL at 08:45

## 2017-08-30 RX ADMIN — ISOSORBIDE MONONITRATE 30 MG: 30 TABLET, EXTENDED RELEASE ORAL at 08:45

## 2017-08-30 RX ADMIN — ENOXAPARIN SODIUM 40 MG: 40 INJECTION SUBCUTANEOUS at 08:45

## 2017-08-30 RX ADMIN — POTASSIUM CHLORIDE 20 MEQ: 750 TABLET, FILM COATED, EXTENDED RELEASE ORAL at 08:45

## 2017-08-30 RX ADMIN — OXYCODONE HYDROCHLORIDE AND ACETAMINOPHEN 1 TABLET: 5; 325 TABLET ORAL at 14:09

## 2017-08-30 RX ADMIN — MIDODRINE HYDROCHLORIDE 5 MG: 5 TABLET ORAL at 08:45

## 2017-08-30 RX ADMIN — FUROSEMIDE 20 MG: 20 TABLET ORAL at 08:45

## 2017-08-30 RX ADMIN — POLYETHYLENE GLYCOL 3350 17 G: 17 POWDER, FOR SOLUTION ORAL at 08:44

## 2017-08-30 RX ADMIN — GABAPENTIN 600 MG: 300 CAPSULE ORAL at 08:44

## 2017-08-30 RX ADMIN — DOCUSATE SODIUM AND SENNOSIDES 1 TABLET: 8.6; 5 TABLET, FILM COATED ORAL at 08:45

## 2017-08-30 RX ADMIN — PANTOPRAZOLE SODIUM 40 MG: 40 TABLET, DELAYED RELEASE ORAL at 08:45

## 2017-08-30 RX ADMIN — CASTOR OIL AND BALSAM, PERU: 788; 87 OINTMENT TOPICAL at 08:46

## 2017-08-30 NOTE — DISCHARGE INSTRUCTIONS
Patient Discharge Instructions     Pt Name  Cathren Lennox   Date of Birth 9/16/1932   Age  80 y.o. Medical Record Number  [id number]   PCP Britany Gandara MD    Admit date:  8/17/2017 @    [place] [address]    Room Number  2265/01   Date of Discharge 8/30/2017     Admission Diagnoses:     CAP (community acquired pneumonia)          Allergies   Allergen Reactions    Contrast Agent [Iodine] Hives    Levaquin [Levofloxacin] Unknown (comments)    Sulfa (Sulfonamide Antibiotics) Unknown (comments)        You were admitted to [address] for  CAP (community acquired pneumonia)    [place] (BUT NOT LIMITED TO ):  Present on Admission:   Essential hypertension   Coronary artery disease involving native coronary artery of native heart without angina pectoris   Therapeutic opioid-induced constipation (OIC)   Chronic left hip pain   Weight loss   Abnormality of gait due to impairment of balance      DIET:  Dysphagia II diet  Recommended activity: Activity as tolerated; pt has been bed bound for the past a few months  Follow up : Follow-up Information     Follow up With Details Comments Contact Info    Britany De La Torre MD  PCP - follow up in 1-2 weeks after discharge from [facility] [address] [place] [address]      Gabriel Butler MD  Pulmonary - follow up in 4 weeks - repeat chest xray [address]  [facility]  [address]  [address] [phone]      [address] [place] [address] are being discharged to facility for skilled care  [address] [place] [address], [facility]  [address] information referral has been submitted to this agency [phone]        ** please keep the lechuga catheter in. Lechuga was inserted on 8/17/2017. ** Sacral/buttocks wound: daily, cleanse with NS, wipe wounds clean.  Apply a smear of wound gel to each wound base, cover with a small square of Aquacell-AG and then a sacral foam dressing. Skilled nursing facility MD responsible for above upon discharge. · It is important that you take the medication exactly as they are prescribed. · Keep your medication in the bottles provided by the pharmacist and keep a list of the medication names, dosages, and times to be taken in your wallet. · Do not take other medications without consulting your doctor. ADDITIONAL INFORMATION: If you experience any of the following symptoms or have any health problem not listed below, then please call your primary care physician or return to the emergency room if you cannot get hold of your doctor: Fever, chills, nausea, vomiting, diarrhea, change in mentation, falling, bleeding, shortness of breath. I understand that if any problems occur once I am discharged, I am supposed to call my Primary care physician for further care or seek help in the Emergency Department at the nearest Healthcare facility. I have had an opportunity to discuss my clinical issues with my doctor and nursing staff. I understand and acknowledge receipt of the above instructions.                                                                                                                                            Physician's or R.N.'s Signature                                                            Date/Time                                                                                                                                              Patient or Representative Signature                                                 Date/Time

## 2017-08-30 NOTE — PROGRESS NOTES
Palliative Medicine Consult  [place]: [phone] ([phone])    Patient Name: Annalisa Redmond  YOB: 1932    Date of Initial Consult: 8/23/17  Reason for Consult:  Care decisions/pain mmt? Requesting Provider: Murray Alcala   Primary Care Physician: Madi Dawson MD      SUMMARY:   Annalisa Redmond is a 80 y. o. with a past history of  CAD, DM II, HTN, dyslipidemia, AAA, LBBB, GERD, and MI, and PSHx of stent placement x 2, who was admitted on 8/17/2017 from transferred from [facility] ED with a diagnosis of elevated troponin. Current medical issues leading to Palliative Medicine involvement include: severe left hip pain, PNA, canker sores 2/2 drug reaction. PER :  9/2016-5/2017 Tyl #3 #60 q. Month Dr.Kevin Golden  5/2017-6/2017 Norco 5/325mg #30 q. 2 weeks Dr. Lima Toscano  6/5/2017 Norco 5/325mg #90/23 days Dollene Spina  6/26/17 Norco 5/325mg #30/8 days Lima Toscano  7/5/17 percocet 5/325mg #90/23 days, Dereck Spina  7/16/17 oxycodone 15 IR #20/4 days, Joy Payton  7/22/17 diazepam 5mg #30/15 days Jerelyn Tawnya  7/23/17 oxycodone 15 IR #30/5 days Jerelyn Tawnya    24 hour opioid use:  8/28/17: 15mg Percocet  8/27/17: 15mg Percocet  8/26/17: 20mg Percocet  8/25/17: 0.2mg IV Dilaudid, 15mg Percocet  8/24/17: 0.4mg IV Dilaudid, 25mg Percocet  8/23/17: 1 mg IV Dilaudid  8/22/17: 1 mg IV Dilaudid    Psychosocial: lives at home with wife, daughter and DAGO; daughter and DAGO were caring for pt, however, DAGO had to have a hernia repaired so pt was moved to [facility]; DAGO is still recovering, thus, not able to assist with care. PALLIATIVE DIAGNOSES:   1. Chest pain: HCAP  2. Abdominal pain  3. Nausea and vomiting  4. Left hip pain x 3 months (chronic impacted left femoral head fracture): 8/21 hip aspiration  5. Sacral decubitus ulcer: stage 3  6. Diabetic polyneuropathy   7. Obesity   8. Severe protein calorie malnutrition   9. canker sores, MRSA pos  10.  Weight loss (240 lbs on 7/19/17)  (now 227 lbs)  Not eating with canker sores x 2 weeks  11. Opioid induced constipation  12. Hypotension   13. Gait impairment     PLAN:   1. CARE DECISIONS:   Spoke with dtr James Starr about pt's discharge to [facility] with Hospice support. Discussed KUB neg for constipation, that pt will remain on bowel maintenance program as long as he is taking opioids. Please leave lechuga in when discharged to [facility]; Hospice will maintain. 2. PAIN: .   1. Schedule Percocet 5/325mg q. 6 hours. 2. Avoid long-acting opioids at this time. Elderly patients in nursing homes typically do poorly with long-acting opioids and are frequently re-admitted for sedation issues. 2. OPIOID INDUCED CONSTIPATION: KUB negative for constipation. 1. Continue daily Miralax daily and Pericolace bid as long as pt is taking opioids. 3. ASYMPTOMATIC HYPOTENSION: 2/2 CAD; pt/family declined cardiac catheterization or other invasive procedures. Pt denies symptoms. 1. DO NOT WITHHOLD PAIN MEDICATION FOR HYPOTENSION. 4. Initial consult note routed to primary continuity provider  5. Communicated plan of care with: Palliative IDT, RN, , Hospitalist NP     GOALS OF CARE / TREATMENT PREFERENCES:   [====Goals of Care====]  GOALS OF CARE:  Patient / health care proxy stated goals:     1. dispo is SNF with hospice support, depending on insurance coverage. 2. Optimal pain control.      TREATMENT PREFERENCES:   Code Status: DNR    Advance Care Planning:  Advance Care Planning 8/24/2017   Patient's Healthcare Decision Maker is: Named in scanned ACP document   Primary Decision Maker Name Gm Alonso   Primary Decision Maker Phone Number [phone]   Primary Decision Maker Relationship to Patient Spouse   Secondary Decision Maker Name Cherrie Hopper   Secondary Decision Maker Phone Number [phone]   Secondary Decision Maker Relationship to Patient Adult child   Confirm Advance Directive Yes, on file       Other:    The palliative care team has discussed with patient / health care proxy about goals of care / treatment preferences for patient.  [====Goals of Care====]         HISTORY:     History obtained from: chart    CHIEF COMPLAINT: left hip pain    HPI/SUBJECTIVE:    The patient is:   [x] Verbal and participatory  [] Non-participatory due to:     BIBA to [facility] ED with c/o intermittent dull mild diffuse abd pain x 2 weeks, intermittent CP for past few weeks that got worse on day of admission, N/V that started day of admission. W/u at [facility] revealed a stable 5.3x5 cm infrarenal AAA, distended gallbladder, mild pelvic free fluid; labs significant for WBC 13.6, AST of 104; rest of LFT's WNL; troponin was 0.32; a central line was placed. Pt reports AAA was diagnosed five years ago and is inoperable. he has history of MI and stent placement x2 and is followed by Dr. Peace Giordano, cardiologist. Pt denies taking blood thinners, takes a daily baby ASA. Pt notes he was diagnosed with PNA four days ago, which is being treated with Levaquin. 8/23:   pt asleep, easily awakened, states his pain is currently 8/10, which he states is tolerable; he received 0.5mg IV dilaudid at 0500 and 11am.  Yesterday he received two doses of 0.5mg dilaudid, also had fentanyl patch 25 mcg/hr removed. 8/24:  Eating lunch; only ate 1 cup ice cream.  Not hungry, does not remember when last BM was. Denies discomfort at this time. 8/28: no BM yet, appetite poor, does not feel bloated, no abd pain. No pain at all unless weight bearing or movement of hip.     8/30: pt reports appetite remains very poor.     Clinical Pain Assessment (nonverbal scale for severity on nonverbal patients):   [++++ Clinical Pain Assessment++++]  [++++Pain Severity++++]: Pain: 2/10  [++++Pain Character++++]: sharp  [++++Pain Duration++++]:   \"a long time\"  [++++Pain Effect++++]:   Unable to mobilize  [++++Pain Factors++++]:  movement  [++++Pain Frequency++++]: with movement  [++++Pain Location++++]: left hip  [++++ Clinical Pain Assessment++++]  Duration: for how long has pt been experiencing pain (e.g., 2 days, 1 month, years)  Frequency: how often pain is an issue (e.g., several times per day, once every few days, constant)     FUNCTIONAL ASSESSMENT:     Palliative Performance Scale (PPS):  PPS: 30       PSYCHOSOCIAL/SPIRITUAL SCREENING:     Advance Care Planning:  Advance Care Planning 8/24/2017   Patient's Healthcare Decision Maker is: Named in scanned ACP document   Primary Decision Maker Name Elke Cagle   Primary Decision Maker Phone Number [phone]   Primary Decision Maker Relationship to Patient Spouse   Secondary Decision Maker Name Michelle Leal   Secondary Decision Maker Phone Number [phone]   Secondary Decision Maker Relationship to Patient Adult child   Confirm Advance Directive Yes, on file        Any spiritual / Adventism concerns:  [] Yes /  [x] No    Caregiver Burnout:  [] Yes /  [] No /  [x] No Caregiver Present      Anticipatory grief assessment:   [x] Normal  / [] Maladaptive       ESAS Anxiety: Anxiety: 0    ESAS Depression: Depression: 2        REVIEW OF SYSTEMS:     Positive and pertinent negative findings in ROS are noted above in HPI. The following systems were [x] reviewed / [] unable to be reviewed as noted in HPI  Other findings are noted below. Systems: constitutional, ears/nose/mouth/throat, respiratory, gastrointestinal, genitourinary, musculoskeletal, integumentary, neurologic, psychiatric, endocrine. Positive findings noted below.   Modified ESAS Completed by: provider   Fatigue: 6 Drowsiness: 2   Depression: 2 Pain: 2   Anxiety: 0 Nausea: 0   Anorexia: 8 Dyspnea: 0     Constipation: No     Stool Occurrence(s): 1        PHYSICAL EXAM:     From RN flowsheet:  Wt Readings from Last 3 Encounters:   08/29/17 212 lb 11.9 oz (96.5 kg)   08/17/17 217 lb 13 oz (98.8 kg)   08/13/17 220 lb 0.3 oz (99.8 kg)     Blood pressure 101/51, pulse 79, temperature 97.8 °F (36.6 °C), resp. rate 16, height 5' 10\" (1.778 m), weight 212 lb 11.9 oz (96.5 kg), SpO2 95 %.     Pain Scale 1: Numeric (0 - 10)  Pain Intensity 1: 0  Pain Onset 1: chronic  Pain Location 1: Back  Pain Orientation 1: Lower  Pain Description 1: Aching  Pain Intervention(s) 1: Medication (see MAR)  Last bowel movement, if known: smears    Constitutional: elderly, obese, asleep but easily aroused, alert, oriented to self, situation, place  Eyes: pupils equal, anicteric  ENMT: no nasal discharge, moist mucous membranes  Cardiovascular: regular rhythm, distal pulses intact  Respiratory: breathing not labored, symmetric  Gastrointestinal: soft non-tender, +bowel sounds  Musculoskeletal: no deformity, pain with left hip passive ROM  Skin: warm, dry, stage 3 decubitus ulcer  Neurologic: following commands, moving all extremities  Psychiatric: full affect, no hallucinations         HISTORY:     Principal Problem:    CAP (community acquired pneumonia) (8/19/2017)    Active Problems:    Coronary artery disease involving native coronary artery of native heart without angina pectoris (7/14/2017)      Essential hypertension (7/14/2017)      Abdominal pain (8/17/2017)      Elevated troponin (8/17/2017)      Therapeutic opioid-induced constipation (OIC) (8/23/2017)      Chronic left hip pain (8/23/2017)      Weight loss (8/23/2017)      Abnormality of gait due to impairment of balance (8/23/2017)      Anorexia (8/24/2017)      Idiopathic hypotension (8/28/2017)      Past Medical History:   Diagnosis Date    [facility]     Asthma     CAD (coronary artery disease)     Mitzy MPI 7/14 LVEF 45, chronic inf-lat ischemia; Echo 7/14 LVEF 45, mod LVH, mild LAE, mild AI/MR    Diabetic polyneuropathy ([facility])     DJD (degenerative joint disease)     DM II (diabetes mellitus, type II), controlled ([facility])     Dyslipidemia     GERD (gastroesophageal reflux disease)     History of left bundle branch block (LBBB)     HTN (hypertension)     Myocardial infarction ([facility] [address]     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    Pneumonia     [facility] 2012    s/p XRT, hormonal      Past Surgical History:   Procedure Laterality Date    HX APPENDECTOMY      HX HEMORRHOIDECTOMY      HX PTCA      x 2 stents, vessel unknown, last 10 yrs ago      Family History   Problem Relation Age of Onset    Tuberculosis Mother     Asthma Father     Coronary Artery Disease Sister     Asthma Sister       History reviewed, no pertinent family history.   Social History   Substance Use Topics    Smoking status: Former Smoker     Types: Cigarettes     Quit date: 7/14/1997    Smokeless tobacco: Never Used    Alcohol use No     Allergies   Allergen Reactions    Contrast Agent [Iodine] Hives    Levaquin [Levofloxacin] Unknown (comments)    Sulfa (Sulfonamide Antibiotics) Unknown (comments)      Current Facility-Administered Medications   Medication Dose Route Frequency    oxyCODONE-acetaminophen (PERCOCET) 5-325 mg per tablet 1 Tab  1 Tab Oral Q6H    magnesium oxide (MAG-OX) tablet 400 mg  400 mg Oral BID    furosemide (LASIX) tablet 20 mg  20 mg Oral DAILY    bisacodyl (DULCOLAX) suppository 10 mg  10 mg Rectal DAILY PRN    gabapentin (NEURONTIN) capsule 600 mg  600 mg Oral TID    chlorhexidine (PERIDEX) 0.12 % mouthwash 15 mL  15 mL Oral Q12H    albuterol-ipratropium (DUO-NEB) 2.5 MG-0.5 MG/3 ML  3 mL Nebulization Q6H PRN    pantoprazole (PROTONIX) tablet 40 mg  40 mg Oral ACB    midodrine (PROAMITINE) tablet 5 mg  5 mg Oral TID WITH MEALS    potassium chloride SR (KLOR-CON 10) tablet 20 mEq  20 mEq Oral DAILY    acetaminophen (TYLENOL) tablet 650 mg  650 mg Oral Q4H PRN    labetalol (NORMODYNE;TRANDATE) injection 10 mg  10 mg IntraVENous Q6H PRN    lactobac ac& pc-s.therm-b.anim (DULCE Q/RISAQUAD)  1 Cap Oral DAILY    ranolazine ER (RANEXA) tablet 500 mg  500 mg Oral BID    balsam peru-castor oil (VENELEX)  mg/gram ointment   Topical DAILY    aspirin delayed-release tablet 81 mg  81 mg Oral DAILY    DULoxetine (CYMBALTA) capsule 30 mg  30 mg Oral DAILY    isosorbide mononitrate ER (IMDUR) tablet 30 mg  30 mg Oral DAILY    levothyroxine (SYNTHROID) tablet 50 mcg  50 mcg Oral ACB    montelukast (SINGULAIR) tablet 10 mg  10 mg Oral DAILY    nitroglycerin (NITROSTAT) tablet 0.4 mg  0.4 mg SubLINGual PRN    tamsulosin (FLOMAX) capsule 0.8 mg  0.8 mg Oral DAILY    ondansetron (ZOFRAN) injection 4 mg  4 mg IntraVENous Q4H PRN    sodium chloride (NS) flush 5-10 mL  5-10 mL IntraVENous Q8H    sodium chloride (NS) flush 5-10 mL  5-10 mL IntraVENous PRN    naloxone (NARCAN) injection 0.4 mg  0.4 mg IntraVENous PRN    enoxaparin (LOVENOX) injection 40 mg  40 mg SubCUTAneous DAILY    insulin lispro (HUMALOG) injection   SubCUTAneous AC&HS    glucose chewable tablet 16 g  4 Tab Oral PRN    glucagon (GLUCAGEN) injection 1 mg  1 mg IntraMUSCular PRN    dextrose 10% infusion 125-250 mL  125-250 mL IntraVENous PRN    senna-docusate (PERICOLACE) 8.6-50 mg per tablet 1 Tab  1 Tab Oral BID    polyethylene glycol (MIRALAX) packet 17 g  17 g Oral DAILY          LAB AND IMAGING FINDINGS:     Lab Results   Component Value Date/Time    WBC 5.3 08/30/2017 04:12 AM    HGB 7.7 08/30/2017 04:12 AM    PLATELET 253 07/83/7285 04:12 AM     Lab Results   Component Value Date/Time    Sodium 133 08/30/2017 04:12 AM    Potassium 3.9 08/30/2017 04:12 AM    Chloride 94 08/30/2017 04:12 AM    CO2 38 08/30/2017 04:12 AM    BUN 10 08/30/2017 04:12 AM    Creatinine 0.54 08/30/2017 04:12 AM    Calcium 7.9 08/30/2017 04:12 AM    Magnesium 1.7 08/29/2017 03:24 AM      Lab Results   Component Value Date/Time    AST (SGOT) 14 08/29/2017 03:24 AM    Alk.  phosphatase 83 08/29/2017 03:24 AM    Protein, total 5.4 08/29/2017 03:24 AM    Albumin 1.5 08/29/2017 03:24 AM    Globulin 3.9 08/29/2017 03:24 AM     Lab Results   Component Value Date/Time    INR 1.4 08/17/2017 02:39 PM Prothrombin time 15.7 08/17/2017 02:39 PM    aPTT 20.4 08/17/2017 02:39 PM      No results found for: IRON, FE, TIBC, IBCT, PSAT, FERR   No results found for: PH, PCO2, PO2  No components found for: [place]   Lab Results   Component Value Date/Time    CK 23 08/17/2017 05:30 PM    CK - MB 1.8 08/17/2017 05:30 PM                Total time:  30 min  Counseling / coordination time, spent as noted above: 25 min  > 50% counseling / coordination?: yes  Prolonged service was provided for  []30 min   []75 min in face to face time in the presence of the patient, spent as noted above. Time Start:   Time End:   Note: this can only be billed with 52131 (initial) or 01028 (follow up). If multiple start / stop times, list each separately.

## 2017-08-30 NOTE — PROGRESS NOTES
Problem: Mobility Impaired (Adult and Pediatric)  Goal: *Acute Goals and Plan of Care (Insert Text)  Physical Therapy Goals  Revised 8/30/2017  1. Patient will move from scoot up and down and roll side to side in bed with maximal assistance x2 within 7 day(s). 2. Patient will assume seated position EOB with maximal assistance x 2 using the least restrictive device within 7 day(s). 3. Patient will perform supine UE and LE strengthening exercises with stand-by/assisting/verbal cueing within 7 day (s). 4. Patient will maintain seated position EOB x10 minutes with maximal assistance x2 with the least restrictive device within 7 day(s). Initiated 8/23/2017  1. Patient will move from supine to sit and sit to supine in bed with minimal assistance/contact guard assist within 7 day(s). 2. Patient will transfer from bed to chair and chair to bed with minimal assistance/contact guard assist using the least restrictive device within 7 day(s). 3. Patient will perform sit to stand with minimal assistance/contact guard assist within 7 day(s). 4. Patient will ambulate with moderate assistance for 50 feet with the least restrictive device within 7 day(s). PHYSICAL THERAPY TREATMENT: WEEKLY REASSESSMENT  Patient: Lavina Klinefelter (21 y.o. male)  Date: 8/30/2017  Diagnosis: Abdominal pain  Elevated troponin <principal problem not specified>       Precautions: DNR, Contact, Fall, Skin (Stage 4 sacral; EF 15%; in bed x 3 mo PTA)      ASSESSMENT:  Pt received supine in bed, extremely lethargic and drowsy however agreeable to participation with therapy. Vital signs assessed prior to initiating mobility with pt's BP found to be 88/49 and likely contributing to lethargic state. Pt performed LE and UE strengthening exercises as documented below with manual cues and active-assistance while supine in bed. Pt with delayed initiation of motor movements and was noted to be frequently closing eyes.  Re-assessed vital signs with BP remaining low to 84/52. Supine>>sit/OOB mobility attempts deemed unsafe given pt's lethargic state and low BP. Pt fatigued at completion of supine exercises. Noted that family has not yet made a decision regarding hospice/plan of care therefore will continue to follow pt. Recommend SNF at discharge. Patient's progression toward goals since last assessment: Pt is making slow gains towards therapy goals therefore all goals were adjusted appropriately. PLAN:  Goals have been updated based on progression since last assessment. Patient continues to benefit from skilled intervention to address the above impairments. Continue to follow the patient 2 times a week to address goals. Planned Interventions:  [X]              Bed Mobility Training             [ ]       Neuromuscular Re-Education  [X]              Transfer Training                   [ ]       Orthotic/Prosthetic Training  [X]              Gait Training                         [ ]       Modalities  [X]              Therapeutic Exercises           [ ]       Edema Management/Control  [X]              Therapeutic Activities            [X]       Patient and Family Training/Education  [ ]              Other (comment):  Discharge Recommendations: [facility]  Further Equipment Recommendations for Discharge: TBD by facility       SUBJECTIVE:   Patient stated My legs hurt.       OBJECTIVE DATA SUMMARY:   Critical Behavior:  Neurologic State: Alert, Eyes open spontaneously  Orientation Level: Oriented X4  Cognition: Follows commands, Appropriate safety awareness  Safety/Judgement: Decreased insight into deficits, Fall prevention     Strength:                           Functional Mobility Training:  Bed Mobility:                    Transfers:                                   Balance:  Sitting:  (did not occur)  Ambulation/Gait Training:                                                                   Therapeutic Exercises:     EXERCISE   Sets Reps   Active Active Assist   Passive   Comments   Ankle pumps 1 10 [X]                        [ ]                        [ ]                        bilaterally   Heel Slides 1 5 [ ]                        [X]                        [ ]                        R LE only due to L hip pain   Quad sets 1 5 [X]                        [ ]                        [ ]                        L LE only, 5 second hold, manual cues to faciliate         [ ]                        [ ]                        [ ]                               Pain:  Pain Scale 1: Numeric (0 - 10)  Pain Intensity 1: 0              Activity Tolerance:   Hypotensive, lethargic   Please refer to the flowsheet for vital signs taken during this treatment.   After treatment:   [ ]  Patient left in no apparent distress sitting up in chair  [X]  Patient left in no apparent distress in bed  [X]  Call bell left within reach  [X]  Nursing notified  [ ]  Caregiver present  [ ]  Bed alarm activated      COMMUNICATION/COLLABORATION:   The patients plan of care was discussed with: Occupational Therapist, Registered Nurse and      Jeanetta Krabbe, PT, DPT   Time Calculation: 15 mins

## 2017-08-30 NOTE — PROGRESS NOTES
Cm acknowledged discharge order. Patient is being discharged to [facility]. [facility] will provide BLS transport. 4L [place]. Transport confirmed for 1500 today. Follow up appointments noted on on onestop. Facility will coordinate appointments/care. At this writing family has not decided on Hospice care. [facility] will meet with family/patient at facililty. Care Management Interventions  PCP Verified by CM:  Yes  Mode of Transport at Discharge: BLS  Transition of Care Consult (CM Consult): SNF ([facility])  Partner SNF: No  Reason Why Partner SNF Not Chosen: Location  Speech Therapy Consult: Yes  Current Support Network: Lives with Spouse  Confirm Follow Up Transport: Family  Plan discussed with Pt/Family/Caregiver: Yes  Discharge Location  Discharge Placement: Skilled nursing facility    Marielle Nova RN   Ext [phone]

## 2017-08-30 NOTE — PROGRESS NOTES
Zeinab Montano Ad[facility] informed CM that patient can return and be accepted under Medicare for skilled wound care. Medicare can pay for Hospice and facility if the events are unrelated. Cm discussed with patient's daughter Elisa Shultz. Ms. Genora Landau had questions regarding Hospice and their role. Hospice referral has been submitted to [facility]. Ms Genora Landau asked about another agency. CM explained that [facility] and that they specifically provide Hospice care at [facility]. Ms. Genora Landau went on to ask about facliities closer to the hospital.  CM explained that [facility] [address] had informed CM of discussion regarding patient being discharged to facility closer to home to allow family to visit on a regular basis. Ms. Genora Landau asked if CM had discussed the facilities closer to the hospital with her mother. Cm acknowledged that she did not due to information provided by Palliative Care team.      CM informed Mrs. Sanches that we are approaching the time when a decision needs to be made regarding placement  Ms. Sanches informed CM 'I will get back with you\". Cm will continue to follow.     [facility]  Ext [phone]

## 2017-08-30 NOTE — PROGRESS NOTES
[facility] transport confirmed for 1500 -    from [address] re: Referral [id number] for patient in [facility] 2 PROGRESSIVE [id number]: Yes, willing to accept patient PT ALL SET FOR 1500 P/U TODAY 8/30/17    Fili Giles NP informed. Phan FREEMAN informed. [address] at [address] [place] [address] Road and Rehab informed. Patient's wife informed. (wife has informed the patient)    [facility].   Room assgn 103  RN to call report to:  [phone]    RN please fax discharge summary and meds to:  [phone]    PCS, H&P, DNR  and Facesheet on chart    Kyaw Atkinson RN CM  Ext [phone]

## 2017-08-30 NOTE — PROGRESS NOTES
Hospital to SNF [facility]                                                                        80 y.o.   male    [address]   Room: [address] [place] [address] 2 PROGRESSIVE CARE  Unit Phone# :  [phone]      [address]  [facility] [address] [facility]  [address] [address]  Dept: [phone]  Loc: [phone]                    SITUATION     Admitted:  2017         Attending Provider:  Ashli Saxena MD       Consultations:  IP CONSULT TO CARDIOLOGY  IP CONSULT TO ORTHOPEDIC SURGERY  IP CONSULT TO PALLIATIVE CARE - PROVIDER  IP CONSULT TO PULMONOLOGY    PCP:  Erika Clements MD   [phone]    Treatment Team: Attending Provider: Ashli Saxena MD; Consulting Provider: Sherin Belcher MD; Consulting Provider: Pallavi Sommer.  Katarina Tony MD; Utilization Review: Maxwell Nicole RN; Consulting Provider: Bonilla Cline NP; Care Manager: Oksana Ragsdale RN; Care Manager: Cecy Hutchinson    Admitting Dx:  Abdominal pain  Elevated troponin       Principal Problem: CAP (community acquired pneumonia)    * No surgery found * of      BY: * Surgery not found *             ON: * No surgery found *                  Code Status: DNR                Advance Directives:   Advance Care Planning 2017   Patient's Healthcare Decision Maker is: Named in scanned ACP document   Primary Decision Maker Name Aurea Osei   Primary Decision Maker Phone Number [phone]   Primary Decision Maker Relationship to Patient Spouse   Secondary Decision Maker Name Justin Fierro   Secondary Decision Maker Phone Number [phone]   Secondary Decision Maker Relationship to Patient Adult child   Confirm Advance Directive Yes, on file    (Send w/patient)   Yes Not W Pt       Isolation:  Contact       MDRO: MRSA    Pain Medications given:  1400    Last dose:  at      Special Equipment needed: yes  Type of equipment:    (Not currently on dialysis)     BACKGROUND Allergies: Allergies   Allergen Reactions    Contrast Agent [Iodine] Hives    Levaquin [Levofloxacin] Unknown (comments)    Sulfa (Sulfonamide Antibiotics) Unknown (comments)       Past Medical History:   Diagnosis Date    AAA (abdominal aortic aneurysm) (HCC)     Asthma     CAD (coronary artery disease)     Mitzy MPI  LVEF 45, chronic inf-lat ischemia; Echo  LVEF 45, mod LVH, mild LAE, mild AI/MR    Diabetic polyneuropathy (HCC)     DJD (degenerative joint disease)     DM II (diabetes mellitus, type II), controlled ([address]     Dyslipidemia     GERD (gastroesophageal reflux disease)     History of left bundle branch block (LBBB)     HTN (hypertension)     Myocardial infarction ([facility]     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    Pneumonia     Prostate CA ([facility]     s/p XRT, hormonal       Past Surgical History:   Procedure Laterality Date    HX APPENDECTOMY      HX HEMORRHOIDECTOMY      HX PTCA      x 2 stents, vessel unknown, last 10 yrs ago       Prescriptions Prior to Admission   Medication Sig    [] azithromycin (ZITHROMAX Z-TREY) 250 mg tablet 2 pills on day #1, 1 pill daily for days #2-5    DULoxetine (CYMBALTA) 30 mg capsule Take 1 Cap by mouth daily.  gabapentin (NEURONTIN) 600 mg tablet Take 1 Tab by mouth four (4) times daily.  glimepiride (AMARYL) 1 mg tablet Take 1 Tab by mouth every morning.  potassium chloride SR (KLOR-CON 10) 10 mEq tablet Take 1 Tab by mouth daily.  isosorbide mononitrate ER (IMDUR) 60 mg CR tablet Take 1 Tab by mouth every morning.  atorvastatin (LIPITOR) 10 mg tablet Take 1 Tab by mouth daily.  levothyroxine (SYNTHROID) 50 mcg tablet Take  by mouth Daily (before breakfast).  metFORMIN (GLUCOPHAGE) 500 mg tablet Take  by mouth two (2) times daily (with meals).  fluticasone-vilanterol (BREO ELLIPTA) 100-25 mcg/dose inhaler Take 1 Puff by inhalation daily.     carvedilol (COREG) 3.125 mg tablet Take  by mouth two (2) times daily (with meals).  montelukast (SINGULAIR) 10 mg tablet Take 10 mg by mouth daily.  aspirin delayed-release 81 mg tablet Take  by mouth daily.  oxyCODONE-acetaminophen (PERCOCET) 5-325 mg per tablet Take 1 Tab by mouth every four (4) hours as needed for Pain.  predniSONE (STERAPRED DS) 10 mg dose pack 6 pills daily for 3 days, then 4 pills daily for 3 days, then 2 pills daily for 3 days, then 1 pill daily for 3 days    nitroglycerin (NITROSTAT) 0.4 mg SL tablet 1 Tab by SubLINGual route as needed for Chest Pain.  oxyCODONE-acetaminophen (PERCOCET) 7.5-325 mg per tablet Take 1 Tab by mouth Before breakfast, lunch, dinner and at bedtime. Max Daily Amount: 4 Tabs.  oxyCODONE IR (OXY-IR) 15 mg immediate release tablet Take 1 Tab by mouth every four (4) hours as needed for Pain. Max Daily Amount: 90 mg.    diazePAM (VALIUM) 5 mg tablet Take 1 Tab by mouth every twelve (12) hours as needed for Anxiety (muscle spasm). Max Daily Amount: 10 mg.    tamsulosin (FLOMAX) 0.4 mg capsule Take 0.8 mg by mouth daily.  diclofenac EC (VOLTAREN) 50 mg EC tablet Take  by mouth two (2) times a day. Hard scripts included in transfer packet yes    Vaccinations: There is no immunization history on file for this patient. Readmission Risks:    Known Risks: The Charlson Co[employer]bit[employer] Index tool is an evidenced based tool that has more automatic generated information. The tool looks at many different items such as the age of the patient, how many times they were admitted in the last calendar year, current length of stay in the hospital and their diagnosis. All of these items are pulled automatically from information documented in the chart from various places and will generate a score that predicts whether a patient is at low (less than 13), medium (13-20) or high (21 or greater) risk of being readmitted.         ASSESSMENT                Temp: 97.8 °F (36.6 °C) (08/30/17 1417) Pulse (Heart Rate): 79 (08/30/17 1414)     Resp Rate: 16 (08/30/17 1211)           BP: 101/51 (08/30/17 1414)     O2 Sat (%): 95 % (08/30/17 1414)     Weight: 96.5 kg (212 lb 11.9 oz)    Height: 5' 10\" (177.8 cm) (08/29/17 1438)       If above not within 1 hour of discharge:    BP:_____  P:____  R:____ T:_____ O2 Sat: ___%  O2: ______    Active Orders   Diet    DIET DYSPHAGIA ADVANCED SOFT (NDD3)         Orientation: oriented to time, place, person and situation     Active Behaviors: None                                   Active Lines/Drains:  (Peg Tube / Drake / CL or S/L?): no    Urinary Status: External catheter     Last BM: Last Bowel Movement Date: 08/28/17     Skin Integrity: Wound (add Wound LDA)   Wound Buttocks Posterior;Left;Right-DRESSING STATUS: Changed per order    Wound Buttocks Posterior;Left;Right-DRESSING TYPE: Foam    Mobility: Very limited   Weight Bearing Status: WBAT (Weight Bearing as Tolerated)                Lab Results   Component Value Date/Time    Glucose 124 08/30/2017 04:12 AM    Hemoglobin A1c 6.6 08/18/2017 02:09 AM    INR 1.4 08/17/2017 02:39 PM    HGB 7.7 08/30/2017 04:12 AM    HGB 7.6 08/29/2017 03:24 AM        RECOMMENDATION     See After Visit Summary (AVS) for:  · Discharge instructions  · After [address]   · Special equipment needed (entered pre-discharge by Care Management)  · Medication Reconciliation    · Follow up Appointment(s)         Report given/sent by:   Phan Perkins                    Verbal report given to:   FAXED to:         Estimated discharge time:  8/30/2017 at 1500

## 2017-08-30 NOTE — PROGRESS NOTES
Cm confirmed receipt of Hospice consult with [facility]. Spoke with Josemanuela (Citizen of [place]).     Taylor Peña, RN CM  Ext [phone]

## 2017-08-30 NOTE — PROGRESS NOTES
Problem: Self Care Deficits Care Plan (Adult)  Goal: *Acute Goals and Plan of Care (Insert Text)  Occupational Therapy Goals:  Routine Re-eval 8/30/2017 all goals remain   Initiated 8/23/2017   1. Patient will perform grooming seated with set up within 7 days. 2. Patient will perform bathing with moderate assistance within 7 days. 3. Patient will sit edge of bed with supervision in prep for functional tasks. 4. Patient will perform toileting with maximal assistance within 7 days. Discontinue  5. Patient will transfer from bedside commode with moderate assist using the least restrictive device and appropriate durable medical equipment within 7 days. Discontinue 8/30/2017     OCCUPATIONAL THERAPY REEVALUATION  Patient: Fernando Zelaya (14 y.o. male)  Date: 8/30/2017  Diagnosis: Abdominal pain  Elevated troponin CAP (community acquired pneumonia)       Precautions: DNR, Contact, Fall, Skin (Stage 4 sacral; EF 15%; in bed x 3 mo PTA)      ASSESSMENT :  Based on the objective data described below, the patient presents with drowsiness and BP in bed. Performed AAROM BUE but needed hand over hand assist at times. Total assist for repositioning in the bed. Unable to attempt supine to sit due to low BP. BP did not increase with exercise at bed level. No goals achieved since eval and goals have been adjusted. Continue to recommend SNF at discharge. Patient will benefit from skilled intervention to address the above impairments.   Patients rehabilitation potential is considered to be Guarded  Factors which may influence rehabilitation potential include:   [ ]                None noted  [ ]                Mental ability/status  [X]                Medical condition  [ ]                Home/family situation and support systems  [ ]                Safety awareness  [ ]                Pain tolerance/management  [ ]                Other:        PLAN :  Recommendations and Planned Interventions:  [X]                  Self Care Training                  [X]           Therapeutic Activities  [X]                  Functional Mobility Training    [ ]           Cognitive Retraining  [X]                  Therapeutic Exercises           [ ]           Endurance Activities  [X]                  Balance Training                   [ ]           Neuromuscular Re-Education  [ ]                  Visual/Perceptual Training     [ ]      Home Safety Training  [X]                  Patient Education                 [ ]           Family Training/Education  [ ]                  Other (comment):     Frequency/Duration: Patient will be followed by occupational therapy 2 times a week to address goals. Discharge Recommendations: Skilled Nursing Facility  Further Equipment Recommendations for Discharge: defer to SNF       SUBJECTIVE:   Patient stated Sebastian Potter will try.       OBJECTIVE DATA SUMMARY:   Hospital course since last seen and reason for reevaluation: continues to be on PCU  Cognitive/Behavioral Status:  Neurologic State: Drowsy  Orientation Level: Oriented X4  Cognition: Follows commands; Appropriate safety awareness           Skin: 2 stage 2 on buttocks  Vision/Perceptual:    Tracking: Able to track stimulus in all quadrants w/o difficulty                      Acuity: Impaired far vision    Corrective Lenses: Glasses     Range of Motion:     AROM: Generally decreased, functional                       Strength:     Strength: Generally decreased, functional              Coordination:  Coordination: Generally decreased, functional  Fine Motor Skills-Upper: Left Intact; Right Intact    Gross Motor Skills-Upper: Left Impaired;Right Impaired (related to decreased alert state)  Tone & Sensation:     Tone: Normal  Sensation: Intact                       Balance:  Sitting:  (did not occur)  Sitting - Static:  (unable to attempt today)     Functional Mobility and Transfers for ADLs:  Bed Mobility:  Rolling:  Total assistance     Transfers:   unable today        ADL Assessment:  Feeding: Minimum assistance     Oral Facial Hygiene/Grooming: Minimum assistance     Bathing: Maximum assistance     Upper Body Dressing: Maximum assistance; Total assistance     Lower Body Dressing: Total assistance     Toileting: Total assistance                 Therapeutic Exercise:  Shoulder flexion/elbow flexion 10 reps AROM to AAROM  Pain:  Pain Scale 1: Numeric (0 - 10)  Pain Intensity 1: 0              Activity Tolerance:      Please refer to the flowsheet for vital signs taken during this treatment. After treatment:   [ ] Patient left in no apparent distress sitting up in chair  [X] Patient left in no apparent distress in bed  [X] Call bell left within reach  [X] Nursing notified  [ ] Caregiver present  [ ] Bed alarm activated      COMMUNICATION/EDUCATION:   The patients plan of care was discussed with: Physical Therapist, Registered Nurse and patient. [ ]    Home safety education was provided and the patient/caregiver indicated understanding. [ ]    Patient/family have participated as able in goal setting and plan of care. [ ]    Patient/family agree to work toward stated goals and plan of care. [ ]    Patient understands intent and goals of therapy, but is neutral about his/her participation. [X]    Patient is unable to participate in goal setting and plan of care. This patients plan of care is appropriate for delegation to [facility].      Thank you for this referral.  Cyndi Dunlap OTR/L  Time Calculation: 13 mins

## 2017-08-30 NOTE — DISCHARGE SUMMARY
Hospitalist Discharge Summary     Patient ID:  Cachorro Villasenor  [id number]  80 y.o.  1932    PCP on record: Honorio Haynes MD    Admit date: 2017  Discharge date and time: 2017      DISCHARGE DIAGNOSIS:    HCAP  Canker sores  Chest Pain  Elevated Troponin  History of CAD s/p remote PCI  Stress Test negative on 17  Hyperlipidemia  Abdominal Pain: now resolved  Nausea/vomiting (resolved)  Chronic Impacted Left Femoral Head Fracture: ideally needs total hip replacement to control pain  Sacral Decub  Type 2 diabetes mellitus without complication  Diabetic polyneuropathy  Abdominal aortic aneurysm (AAA)   Obesity  Severe Protein Calorie Malnutrition    CONSULTATIONS:  IP CONSULT TO CARDIOLOGY  IP CONSULT TO ORTHOPEDIC SURGERY  IP CONSULT TO PALLIATIVE CARE - PROVIDER  IP CONSULT TO PULMONOLOGY    Excerpted HPI from H&P of Olivier Ware MD:  Cachorro Villasenor is a 80 y.o.  male who presents as a transfer from [facility]. Patient presented there earlier this morning with complaint of vomiting and abdominal pain. Patient moans constantly and obtaining a good history is very difficult. In between these moans seems a paucity of thought as patient stutters to answer questions. Occasionally, he will flow a sentence appropriately but this is rare. After significant questioning patient reports that his left hip is in pain and has been for the last 3 months. He states that when he was suppose to get his hip fixed there was a relative named Sayra Hurst who passed away and he deferred surgery to make the  services. Per chart review, patient's hip was last mentioned in his medical record in July after patient was in the hospital following stress test related CP. This documentation stated that patient had severe left hip arthritis. Patient also complained of CP earlier at [facility] but denies any CP at this time.  He will occasionally have the urge to vomit during my interview but keeps asking for water. Patient denies any SOB or pain with eating. He denies any fevers. He states that it has been 5-6 days since he had a BM.      Patient was sent to ED [facility] because his Troponin was mildly elevated. He also had a CT at [facility] which stated that his gallbladder was distended but abdominal ultrasound in the ED [facility] ED only showed gallbladder sludge without distension.     We were asked to admit for work up and evaluation of the above problems. ______________________________________________________________________  DISCHARGE SUMMARY/HOSPITAL COURSE:  for full details see H&P, daily progress notes, labs, consult notes. HCAP  - CXR 8/17 revaled Left upper lobe atelectasis/consolidation. Repeat CXR on 8/28 revealed Persistent dense airspace disease in left upper lobe consistent with  pneumonia. Pulmonary infarct could have a similar appearance  - continue with IV Cefepime and Vanc. Pt developed severe allergic reaction after taking levofloxacin which resulted severe canker sores. Patient completed total of 10 days of ABT on 8/27/2017     - O2 Sat 96% at 3 L of O2 via n/c; wean O2 as tolerate      Canker sores  - resolved      Chest Pain  Elevated Troponin  History of CAD s/p remote PCI  Stress Test negative on 7/20/17  Hyperlipidemia   - EKG with TWI in V1-V5  - elevated troponin but no intervention per pt and pt's family request  - Conservative medical management. - Echo: Ejection fraction was estimated in the range of 15 % to 20 %  - continue ASA, BB, Ranexa, & IMDUR  - continue statin      Abdominal Pain: now resolved  Nausea/vomiting (resolved)  - CT Abdomen/Pelvis: Distended gallbladder. Infrarenal abdominal aortic aneurysm measures 5.3 x 5 cm (no changes from previous images), Colonic diverticulosis. - Abdominal ultrasound :Sludge layers dependently in the nondistended gallbladder.  No wall thickening or pericholecystic fluid.    - Avoid NSAID  - continue with PPI   - diclofenac has been d/c'd  - KUB revealed no constipation. Continue with current bowel regimen      Chronic Impacted Left Femoral Head Fracture: ideally needs total hip replacement to control pain  - scheduled percocet per palliative team  - ortho following; pain management. Joint aspiration has no growth       Sacral Decub  - MRSA  - STAGE 4 decubitus ulcer: daily, cleanse with NS, wipe wounds clean. Apply a smear of wound gel to each wound base, cover with a small square of Aquacell-AG and then a sacral foam dressing.      Type 2 diabetes mellitus without complication, without long-term current use of insulin   Diabetic polyneuropathy  - pt has been eating limited PO intake. Dysphagia diet as tolerate  - Hgb A1c 6.6  - continue  Gabapentin      Abdominal aortic aneurysm (AAA)   - stable on today's CT:Infrarenal abdominal aortic aneurysm measures 5.3 x 5 cm. No further intervention to be done    Anemia  - no further intervention. Nutritional support    Obesity  Severe Protein Calorie Malnutrition        Code Status: DNR (hospitce team will follow)          _______________________________________________________________________  Patient seen and examined by me on discharge day. Pertinent Findings:  Gen:    Not in distress  Chest: Clear in apex with decreased breath sounds at bases  CVS:   Regular rhythm. No edema  Abd:  Soft, not distended, not tender  Neuro:  Alert, orient x 4 when awake. Pt has been sleepy on and off through out the day  _______________________________________________________________________  DISCHARGE MEDICATIONS:   Current Discharge Medication List      START taking these medications    Details   senna-docusate (PERICOLACE) 8.6-50 mg per tablet Take 1 Tab by mouth two (2) times a day. Qty: 60 Tab, Refills: 0      ranolazine ER (RANEXA) 500 mg SR tablet Take 1 Tab by mouth two (2) times a day. Qty: 60 Tab, Refills: 0      polyethylene glycol (MIRALAX) 17 gram packet Take 1 Packet by mouth daily.   Qty: 30 Packet, Refills: 0 pantoprazole (PROTONIX) 40 mg tablet Take 1 Tab by mouth Daily (before breakfast). Qty: 30 Tab, Refills: 0      midodrine (PROAMITINE) 5 mg tablet Take 1 Tab by mouth three (3) times daily (with meals) for 30 days. Qty: 90 Tab, Refills: 0      magnesium oxide (MAG-OX) 400 mg tablet Take 1 Tab by mouth two (2) times a day. Qty: 60 Tab, Refills: 0      L. acidoph & paracasei- S therm- Bifido (DULCE-Q/RISAQUAD) 8 billion cell cap cap Take 1 Cap by mouth daily. Qty: 30 Cap, Refills: 0      albuterol-ipratropium (DUO-NEB) 2.5 mg-0.5 mg/3 ml nebu 3 mL by Nebulization route every six (6) hours as needed. Qty: 30 Nebule, Refills: 0      furosemide (LASIX) 20 mg tablet Take 1 Tab by mouth daily. Qty: 30 Tab, Refills: 0         CONTINUE these medications which have CHANGED    Details   potassium chloride SR (K-TAB) 20 mEq tablet Take 1 Tab by mouth daily. Qty: 30 Tab, Refills: 0      oxyCODONE-acetaminophen (PERCOCET) 5-325 mg per tablet Take 1 Tab by mouth every six (6) hours. Max Daily Amount: 4 Tabs. Qty: 10 Tab, Refills: 0         CONTINUE these medications which have NOT CHANGED    Details   DULoxetine (CYMBALTA) 30 mg capsule Take 1 Cap by mouth daily. Qty: 1 Cap, Refills: 0      gabapentin (NEURONTIN) 600 mg tablet Take 1 Tab by mouth four (4) times daily. Qty: 1 Tab, Refills: 0      isosorbide mononitrate ER (IMDUR) 60 mg CR tablet Take 1 Tab by mouth every morning. Qty: 30 Tab, Refills: 0      atorvastatin (LIPITOR) 10 mg tablet Take 1 Tab by mouth daily. Qty: 30 Tab, Refills: 0      levothyroxine (SYNTHROID) 50 mcg tablet Take  by mouth Daily (before breakfast). montelukast (SINGULAIR) 10 mg tablet Take 10 mg by mouth daily. aspirin delayed-release 81 mg tablet Take  by mouth daily. nitroglycerin (NITROSTAT) 0.4 mg SL tablet 1 Tab by SubLINGual route as needed for Chest Pain. Qty: 1 Tab, Refills: 0      tamsulosin (FLOMAX) 0.4 mg capsule Take 0.8 mg by mouth daily.          STOP taking these medications       azithromycin (ZITHROMAX Z-TREY) 250 mg tablet Comments:   Reason for Stopping:         glimepiride (AMARYL) 1 mg tablet Comments:   Reason for Stopping:         metFORMIN (GLUCOPHAGE) 500 mg tablet Comments:   Reason for Stopping:         fluticasone-vilanterol (BREO ELLIPTA) 100-25 mcg/dose inhaler Comments:   Reason for Stopping:         carvedilol (COREG) 3.125 mg tablet Comments:   Reason for Stopping:         predniSONE (STERAPRED DS) 10 mg dose pack Comments:   Reason for Stopping:         oxyCODONE-acetaminophen (PERCOCET) 7.5-325 mg per tablet Comments:   Reason for Stopping:         oxyCODONE IR (OXY-IR) 15 mg immediate release tablet Comments:   Reason for Stopping:         diazePAM (VALIUM) 5 mg tablet Comments:   Reason for Stopping:         diclofenac EC (VOLTAREN) 50 mg EC tablet Comments:   Reason for Stopping:               My Recommended Diet, Activity, Wound Care, and follow-up labs are listed in the patient's Discharge Insturctions which I have personally completed and reviewed.     _______________________________________________________________________  DISPOSITION:    Home with Family:    Home with HH/PT/OT/RN:    SNF/LTC: y   BEKAH:    OTHER:        Condition at Discharge:  Stable  _______________________________________________________________________  Follow up with:   PCP : Britany De La Torre MD  Follow-up Information     Follow up With Details Comments Contact Info    Britany De La Torre MD  PCP - follow up in 1-2 weeks after discharge from [facility] [address] [facility] [address] [address], [place]  Pulmonary - follow up in 4 weeks - repeat chest xray [address] [facility]  [address]  [address] [phone]      [facility] [address] [facility] are being discharged to facility for skilled care  [address]   [facility], [address] [facility]  [address] [facility] information referral has been submitted to this agency [phone]              Total time in minutes spent coordinating this discharge (includes going over instructions, follow-up, prescriptions, and preparing report for sign off to her PCP) :  30 minutes    Signed:  Torres Dover NP

## 2017-08-30 NOTE — PROGRESS NOTES
Chart review. [facility] referral failed submission. CM resent referral via ecin.     David Lau RN CM  Ext [phone]

## 2017-09-04 LAB
BACTERIA SPEC CULT: NORMAL
GRAM STN SPEC: NORMAL
GRAM STN SPEC: NORMAL
SERVICE CMNT-IMP: NORMAL
